# Patient Record
Sex: FEMALE | Race: BLACK OR AFRICAN AMERICAN | NOT HISPANIC OR LATINO | Employment: UNEMPLOYED | URBAN - METROPOLITAN AREA
[De-identification: names, ages, dates, MRNs, and addresses within clinical notes are randomized per-mention and may not be internally consistent; named-entity substitution may affect disease eponyms.]

---

## 2023-02-24 LAB
AMPHETAMINE (PRESENCE) IN URINE BY SCREEN METHOD: NORMAL
BARBITURATES PRESENCE IN URINE BY SCREEN METHOD: NORMAL
BENZODIAZEPINE (PRESENCE) IN URINE BY SCREEN METHOD: NORMAL
CANNABINOIDS IN URINE BY SCREEN METHOD: NORMAL
COCAINE (PRESENCE) IN URINE BY SCREEN METHOD: NORMAL
DRUG SCREEN COMMENT URINE: NORMAL
FENTANYL URINE: NORMAL
METHADONE (PRESENCE) IN URINE BY SCREEN METHOD: NORMAL
OPIATES (PRESENCE) IN URINE BY SCREEN METHOD: NORMAL
OXYCODONE (PRESENCE) IN URINE BY SCREEN METHOD: NORMAL
PHENCYCLIDINE (PRESENCE) IN URINE BY SCREEN METHOD: NORMAL

## 2023-02-27 LAB — BENZOYLECGONINE, URINE: <50 NG/ML

## 2023-04-13 LAB
ABO GROUP (TYPE) IN BLOOD: NORMAL
ANTIBODY SCREEN: NORMAL
RH FACTOR: NORMAL

## 2023-05-05 LAB
CREATININE (MG/DL) IN SER/PLAS: NORMAL
GFR FEMALE: NORMAL
GFR MALE: NORMAL
UREA NITROGEN (MG/DL) IN SER/PLAS: NORMAL

## 2023-07-10 ENCOUNTER — HOSPITAL ENCOUNTER (OUTPATIENT)
Dept: DATA CONVERSION | Facility: HOSPITAL | Age: 53
End: 2023-07-10
Attending: OTOLARYNGOLOGY | Admitting: OTOLARYNGOLOGY
Payer: COMMERCIAL

## 2023-07-10 DIAGNOSIS — E78.5 HYPERLIPIDEMIA, UNSPECIFIED: ICD-10-CM

## 2023-07-10 DIAGNOSIS — R60.9 EDEMA, UNSPECIFIED: ICD-10-CM

## 2023-07-10 DIAGNOSIS — N18.32 CHRONIC KIDNEY DISEASE, STAGE 3B (MULTI): ICD-10-CM

## 2023-07-10 DIAGNOSIS — I12.9 HYPERTENSIVE CHRONIC KIDNEY DISEASE WITH STAGE 1 THROUGH STAGE 4 CHRONIC KIDNEY DISEASE, OR UNSPECIFIED CHRONIC KIDNEY DISEASE: ICD-10-CM

## 2023-07-10 DIAGNOSIS — C41.0 MALIGNANT NEOPLASM OF BONES OF SKULL AND FACE (MULTI): ICD-10-CM

## 2023-07-10 DIAGNOSIS — F17.200 NICOTINE DEPENDENCE, UNSPECIFIED, UNCOMPLICATED: ICD-10-CM

## 2023-07-10 DIAGNOSIS — F10.10 ALCOHOL ABUSE, UNCOMPLICATED: ICD-10-CM

## 2023-07-10 DIAGNOSIS — I89.8 OTHER SPECIFIED NONINFECTIVE DISORDERS OF LYMPHATIC VESSELS AND LYMPH NODES: ICD-10-CM

## 2023-07-10 DIAGNOSIS — F12.90 CANNABIS USE, UNSPECIFIED, UNCOMPLICATED: ICD-10-CM

## 2023-07-11 LAB
COMPLETE PATHOLOGY REPORT: NORMAL
CONVERTED DIAGNOSIS COMMENT: NORMAL
CONVERTED FINAL DIAGNOSIS: NORMAL
CONVERTED FINAL REPORT PDF LINK TO COPY AND PASTE: NORMAL
CONVERTED SPECIMEN DESCRIPTION: NORMAL

## 2023-09-26 PROBLEM — K64.3 PROLAPSED INTERNAL HEMORRHOIDS, GRADE 4: Status: ACTIVE | Noted: 2023-09-26

## 2023-09-26 PROBLEM — M27.2 OSTEORADIONECROSIS OF MANDIBLE: Status: ACTIVE | Noted: 2023-09-26

## 2023-09-26 PROBLEM — K21.9 REFLUX LARYNGITIS: Status: ACTIVE | Noted: 2023-09-26

## 2023-09-26 PROBLEM — R22.1 NECK MASS: Status: ACTIVE | Noted: 2023-09-26

## 2023-09-26 PROBLEM — C73 THYROID CARCINOMA (MULTI): Status: ACTIVE | Noted: 2023-09-26

## 2023-09-26 PROBLEM — D70.9 NEUTROPENIC FEVER (CMS-HCC): Status: ACTIVE | Noted: 2023-09-26

## 2023-09-26 PROBLEM — K06.8 GUM LESION: Status: ACTIVE | Noted: 2023-09-26

## 2023-09-26 PROBLEM — R13.10 DYSPHAGIA: Status: ACTIVE | Noted: 2023-09-26

## 2023-09-26 PROBLEM — F32.1 MAJOR DEPRESSIVE DISORDER, SINGLE EPISODE, MODERATE (MULTI): Status: ACTIVE | Noted: 2023-09-26
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
## 2023-09-26 PROBLEM — R60.9 PAROTID SWELLING: Status: ACTIVE | Noted: 2023-09-26

## 2023-09-26 PROBLEM — C41.1: Status: ACTIVE | Noted: 2023-09-26

## 2023-09-26 PROBLEM — C31.0: Status: ACTIVE | Noted: 2023-09-26

## 2023-09-26 PROBLEM — M79.2 NEUROPATHIC PAIN: Status: ACTIVE | Noted: 2023-09-26

## 2023-09-26 PROBLEM — I89.0 LYMPHEDEMA OF FACE: Status: ACTIVE | Noted: 2023-09-26

## 2023-09-26 PROBLEM — J04.0 REFLUX LARYNGITIS: Status: ACTIVE | Noted: 2023-09-26

## 2023-09-26 PROBLEM — D35.00 ADRENAL ADENOMA: Status: ACTIVE | Noted: 2023-09-26

## 2023-09-26 PROBLEM — K64.4 ANAL SKIN TAG: Status: ACTIVE | Noted: 2023-09-26

## 2023-09-26 PROBLEM — F41.9 ANXIETY DISORDER: Status: ACTIVE | Noted: 2023-09-26

## 2023-09-26 PROBLEM — M67.439 GANGLION CYST OF WRIST: Status: ACTIVE | Noted: 2023-09-26

## 2023-09-26 PROBLEM — E26.09 PRIMARY HYPERALDOSTERONISM (MULTI): Status: ACTIVE | Noted: 2023-09-26

## 2023-09-26 PROBLEM — N18.30 CKD (CHRONIC KIDNEY DISEASE) STAGE 3, GFR 30-59 ML/MIN (MULTI): Status: ACTIVE | Noted: 2023-09-26

## 2023-09-26 PROBLEM — E03.9 HYPOTHYROIDISM: Status: ACTIVE | Noted: 2023-09-26

## 2023-09-26 PROBLEM — R29.898: Status: ACTIVE | Noted: 2023-09-26

## 2023-09-26 PROBLEM — I12.9 HYPERTENSIVE RENAL DISEASE: Status: ACTIVE | Noted: 2021-02-23

## 2023-09-26 PROBLEM — C80.1 MALIGNANT NEOPLASM (MULTI): Status: ACTIVE | Noted: 2022-06-27

## 2023-09-26 PROBLEM — M79.89 SWELLING OF DIGIT OF RIGHT HAND: Status: ACTIVE | Noted: 2023-09-26

## 2023-09-26 PROBLEM — M65.90 TENOSYNOVITIS: Status: ACTIVE | Noted: 2023-09-26

## 2023-09-26 PROBLEM — M54.16 LUMBAR RADICULOPATHY: Status: ACTIVE | Noted: 2023-09-26

## 2023-09-26 PROBLEM — R50.81 NEUTROPENIC FEVER (CMS-HCC): Status: ACTIVE | Noted: 2023-09-26

## 2023-09-26 PROBLEM — R53.1 GENERALIZED WEAKNESS: Status: ACTIVE | Noted: 2023-09-26

## 2023-09-26 PROBLEM — R07.9 CHEST PAIN: Status: ACTIVE | Noted: 2023-09-26

## 2023-09-26 PROBLEM — G89.3 NEOPLASM RELATED PAIN: Status: ACTIVE | Noted: 2023-09-26

## 2023-09-26 PROBLEM — K14.0 TONGUE ULCER: Status: ACTIVE | Noted: 2023-09-26

## 2023-09-26 PROBLEM — M65.9 TENOSYNOVITIS: Status: ACTIVE | Noted: 2023-09-26

## 2023-09-26 RX ORDER — NITROGLYCERIN 0.4 MG/1
TABLET SUBLINGUAL
Status: ON HOLD | COMMUNITY
Start: 2016-01-29 | End: 2023-11-05 | Stop reason: SDUPTHER

## 2023-09-26 RX ORDER — FENTANYL 25 UG/1
PATCH TRANSDERMAL
Status: ON HOLD | COMMUNITY
Start: 2022-05-23 | End: 2023-11-02 | Stop reason: ALTCHOICE

## 2023-09-26 RX ORDER — LEVOTHYROXINE SODIUM 25 UG/1
TABLET ORAL
COMMUNITY
Start: 2023-04-21 | End: 2023-10-05

## 2023-09-26 RX ORDER — IBUPROFEN 200 MG
TABLET ORAL
Status: ON HOLD | COMMUNITY
Start: 2022-09-28 | End: 2023-11-02

## 2023-09-26 RX ORDER — HYDROCORTISONE 25 MG/G
CREAM TOPICAL
Status: ON HOLD | COMMUNITY
Start: 2022-05-25 | End: 2023-11-02 | Stop reason: ALTCHOICE

## 2023-09-26 RX ORDER — FLUOXETINE 20 MG/1
1 TABLET ORAL DAILY
Status: ON HOLD | COMMUNITY
Start: 2022-07-15 | End: 2023-11-05 | Stop reason: SDUPTHER

## 2023-09-26 RX ORDER — ASPIRIN 325 MG
325 TABLET ORAL
COMMUNITY
Start: 2022-03-24 | End: 2023-11-05 | Stop reason: HOSPADM

## 2023-09-26 RX ORDER — METHADONE HYDROCHLORIDE 10 MG/5ML
SOLUTION ORAL
COMMUNITY
End: 2023-10-18 | Stop reason: DRUGHIGH

## 2023-09-26 RX ORDER — METOPROLOL SUCCINATE 50 MG/1
TABLET, EXTENDED RELEASE ORAL
COMMUNITY
Start: 2023-07-03 | End: 2023-10-18 | Stop reason: WASHOUT

## 2023-09-26 RX ORDER — SENNOSIDES 8.6 MG/1
TABLET ORAL
COMMUNITY
Start: 2022-03-24 | End: 2023-10-18 | Stop reason: DRUGHIGH

## 2023-09-26 RX ORDER — NICOTINE 7MG/24HR
PATCH, TRANSDERMAL 24 HOURS TRANSDERMAL
Status: ON HOLD | COMMUNITY
Start: 2022-10-27 | End: 2023-11-02

## 2023-09-26 RX ORDER — DEXAMETHASONE 4 MG/1
TABLET ORAL
Status: ON HOLD | COMMUNITY
Start: 2022-06-08 | End: 2023-11-02 | Stop reason: ALTCHOICE

## 2023-09-26 RX ORDER — ROSUVASTATIN CALCIUM 10 MG/1
10 TABLET, COATED ORAL DAILY
Status: ON HOLD | COMMUNITY
Start: 2023-07-03 | End: 2023-11-05 | Stop reason: SDUPTHER

## 2023-09-26 RX ORDER — OMEPRAZOLE 40 MG/1
CAPSULE, DELAYED RELEASE ORAL
COMMUNITY
Start: 2023-06-16 | End: 2023-10-18 | Stop reason: DRUGHIGH

## 2023-09-26 RX ORDER — CHLORHEXIDINE GLUCONATE ORAL RINSE 1.2 MG/ML
15 SOLUTION DENTAL 4 TIMES DAILY
Status: ON HOLD | COMMUNITY
Start: 2022-03-31 | End: 2023-11-02 | Stop reason: SDUPTHER

## 2023-09-26 RX ORDER — NYSTATIN 100000 [USP'U]/ML
SUSPENSION ORAL
Status: ON HOLD | COMMUNITY
Start: 2022-11-04 | End: 2023-11-02 | Stop reason: ALTCHOICE

## 2023-09-26 RX ORDER — AMLODIPINE BESYLATE 10 MG/1
10 TABLET ORAL DAILY
COMMUNITY
Start: 2015-05-26 | End: 2023-10-18 | Stop reason: SDUPTHER

## 2023-09-26 RX ORDER — LISINOPRIL 40 MG/1
1 TABLET ORAL DAILY
Status: ON HOLD | COMMUNITY
Start: 2016-01-26 | End: 2023-11-05 | Stop reason: SDUPTHER

## 2023-09-26 RX ORDER — PROCHLORPERAZINE MALEATE 10 MG
TABLET ORAL
Status: ON HOLD | COMMUNITY
Start: 2022-06-08 | End: 2023-11-02 | Stop reason: ALTCHOICE

## 2023-09-26 RX ORDER — PETROLATUM 1 G/G
OINTMENT TOPICAL
Status: ON HOLD | COMMUNITY
Start: 2022-06-09 | End: 2023-11-05 | Stop reason: SDUPTHER

## 2023-09-26 RX ORDER — ACETAMINOPHEN 160 MG/5ML
SUSPENSION ORAL
COMMUNITY
Start: 2022-03-24 | End: 2023-10-18 | Stop reason: SDUPTHER

## 2023-09-26 RX ORDER — OLANZAPINE 5 MG/1
5 TABLET ORAL NIGHTLY
Status: ON HOLD | COMMUNITY
Start: 2022-06-10 | End: 2023-11-02 | Stop reason: SDUPTHER

## 2023-09-26 RX ORDER — OXYCODONE HCL 20 MG/ML
CONCENTRATE, ORAL ORAL
Status: ON HOLD | COMMUNITY
Start: 2023-01-03 | End: 2023-11-02 | Stop reason: ALTCHOICE

## 2023-09-26 RX ORDER — HYDRALAZINE HYDROCHLORIDE 50 MG/1
1 TABLET, FILM COATED ORAL 3 TIMES DAILY
COMMUNITY
Start: 2022-03-02 | End: 2023-11-05 | Stop reason: HOSPADM

## 2023-09-26 RX ORDER — EVENING PRIMROSE OIL 500 MG
100 CAPSULE ORAL DAILY
Status: ON HOLD | COMMUNITY
End: 2023-11-02 | Stop reason: ALTCHOICE

## 2023-09-26 RX ORDER — FAMOTIDINE 20 MG/1
20 TABLET, FILM COATED ORAL DAILY
COMMUNITY
Start: 2022-03-24 | End: 2023-10-18 | Stop reason: DRUGHIGH

## 2023-09-26 RX ORDER — DM/P-EPHED/ACETAMINOPH/DOXYLAM 30-7.5/3
LIQUID (ML) ORAL
Status: ON HOLD | COMMUNITY
Start: 2022-10-11 | End: 2023-11-02

## 2023-09-26 RX ORDER — SPIRONOLACTONE 25 MG/1
25 TABLET ORAL DAILY
COMMUNITY
Start: 2017-07-10 | End: 2023-10-18 | Stop reason: DRUGHIGH

## 2023-09-26 RX ORDER — PREDNISONE 20 MG/1
TABLET ORAL
Status: ON HOLD | COMMUNITY
Start: 2022-03-02 | End: 2023-11-02 | Stop reason: ALTCHOICE

## 2023-09-26 RX ORDER — OMEPRAZOLE 20 MG/1
CAPSULE, DELAYED RELEASE ORAL
COMMUNITY
End: 2023-10-18 | Stop reason: DRUGHIGH

## 2023-09-26 RX ORDER — ONDANSETRON HYDROCHLORIDE 8 MG/1
TABLET, FILM COATED ORAL
Status: ON HOLD | COMMUNITY
Start: 2022-06-08 | End: 2023-11-02 | Stop reason: ALTCHOICE

## 2023-09-26 RX ORDER — METHADONE HYDROCHLORIDE 5 MG/5ML
SOLUTION ORAL
COMMUNITY
Start: 2023-03-20 | End: 2023-10-18 | Stop reason: DRUGHIGH

## 2023-09-26 RX ORDER — GABAPENTIN 250 MG/5ML
SOLUTION ORAL
COMMUNITY
Start: 2022-03-24 | End: 2023-10-18 | Stop reason: DRUGHIGH

## 2023-09-26 RX ORDER — ACETAMINOPHEN 160 MG/5ML
SUSPENSION ORAL
COMMUNITY
Start: 2022-12-01 | End: 2023-10-18 | Stop reason: SDUPTHER

## 2023-09-26 RX ORDER — NALOXONE HYDROCHLORIDE 4 MG/.1ML
4 SPRAY NASAL DAILY PRN
COMMUNITY
Start: 2022-03-24 | End: 2023-11-05 | Stop reason: HOSPADM

## 2023-09-26 RX ORDER — METOPROLOL TARTRATE 25 MG/1
TABLET, FILM COATED ORAL
COMMUNITY
Start: 2022-03-24 | End: 2023-10-18 | Stop reason: DRUGHIGH

## 2023-09-26 RX ORDER — PENTOXIFYLLINE 400 MG/1
400 TABLET, EXTENDED RELEASE ORAL
Status: ON HOLD | COMMUNITY
Start: 2023-06-12 | End: 2023-11-02 | Stop reason: SDUPTHER

## 2023-09-26 RX ORDER — PRAVASTATIN SODIUM 40 MG/1
40 TABLET ORAL NIGHTLY
COMMUNITY
End: 2023-10-18 | Stop reason: DRUGHIGH

## 2023-09-26 RX ORDER — FENTANYL 12.5 UG/1
PATCH TRANSDERMAL
Status: ON HOLD | COMMUNITY
Start: 2022-04-07 | End: 2023-11-02 | Stop reason: ALTCHOICE

## 2023-09-26 RX ORDER — OXYCODONE HCL 5 MG/5 ML
SOLUTION, ORAL ORAL
COMMUNITY
Start: 2022-03-24 | End: 2023-10-18 | Stop reason: DRUGHIGH

## 2023-09-26 RX ORDER — HYDROCHLOROTHIAZIDE 25 MG/1
1 TABLET ORAL DAILY
Status: ON HOLD | COMMUNITY
Start: 2017-07-10 | End: 2023-11-05 | Stop reason: SDUPTHER

## 2023-09-26 RX ORDER — DOCUSATE SODIUM 50 MG/5ML
LIQUID ORAL
COMMUNITY
Start: 2022-03-24 | End: 2023-10-18 | Stop reason: DRUGHIGH

## 2023-09-26 RX ORDER — LEVOTHYROXINE SODIUM 50 UG/1
TABLET ORAL
COMMUNITY
Start: 2023-02-16 | End: 2023-10-05

## 2023-09-26 RX ORDER — PREGABALIN 20 MG/ML
SOLUTION ORAL
Status: ON HOLD | COMMUNITY
Start: 2022-08-11 | End: 2023-11-02 | Stop reason: ALTCHOICE

## 2023-10-02 ENCOUNTER — PHARMACY VISIT (OUTPATIENT)
Dept: PHARMACY | Facility: CLINIC | Age: 53
End: 2023-10-02
Payer: MEDICAID

## 2023-10-02 ENCOUNTER — OFFICE VISIT (OUTPATIENT)
Dept: WOUND CARE | Facility: CLINIC | Age: 53
End: 2023-10-02
Payer: COMMERCIAL

## 2023-10-02 PROCEDURE — G0277 HBOT, FULL BODY CHAMBER, 30M: HCPCS

## 2023-10-02 PROCEDURE — RXMED WILLOW AMBULATORY MEDICATION CHARGE

## 2023-10-03 ENCOUNTER — TELEPHONE (OUTPATIENT)
Dept: HEMATOLOGY/ONCOLOGY | Facility: HOSPITAL | Age: 53
End: 2023-10-03
Payer: COMMERCIAL

## 2023-10-03 ENCOUNTER — OFFICE VISIT (OUTPATIENT)
Dept: WOUND CARE | Facility: CLINIC | Age: 53
End: 2023-10-03
Payer: COMMERCIAL

## 2023-10-03 ENCOUNTER — PHARMACY VISIT (OUTPATIENT)
Dept: PHARMACY | Facility: CLINIC | Age: 53
End: 2023-10-03
Payer: MEDICAID

## 2023-10-03 ENCOUNTER — APPOINTMENT (OUTPATIENT)
Dept: LAB | Facility: HOSPITAL | Age: 53
End: 2023-10-03
Payer: COMMERCIAL

## 2023-10-03 DIAGNOSIS — C41.1 CANCER OF INFERIOR MAXILLA (MULTI): Primary | ICD-10-CM

## 2023-10-03 PROCEDURE — RXMED WILLOW AMBULATORY MEDICATION CHARGE

## 2023-10-03 PROCEDURE — G0277 HBOT, FULL BODY CHAMBER, 30M: HCPCS

## 2023-10-03 PROCEDURE — 99183 HYPERBARIC OXYGEN THERAPY: CPT | Performed by: NURSE PRACTITIONER

## 2023-10-04 ENCOUNTER — SOCIAL WORK (OUTPATIENT)
Dept: HEMATOLOGY/ONCOLOGY | Facility: HOSPITAL | Age: 53
End: 2023-10-04
Payer: COMMERCIAL

## 2023-10-04 ENCOUNTER — OFFICE VISIT (OUTPATIENT)
Dept: WOUND CARE | Facility: CLINIC | Age: 53
End: 2023-10-04
Payer: COMMERCIAL

## 2023-10-04 PROCEDURE — G0277 HBOT, FULL BODY CHAMBER, 30M: HCPCS

## 2023-10-04 NOTE — PROGRESS NOTES
WILMER was consulted by SHAMEKA Boyd, RN Partner to assist with transportation. Patient had an appt on 10/3/23 but was not able to make it. Patient has been reschedule for appt on 10/5/23 at 8am. WILMER called [facility] to set up trip. Patient will be picked up between 7am and 7:30am. Patient will have return ride at 9:00am. Trip # is [id number]. WILMER called patient today to update her and also updated RN Partner.    10/5/23: SW received phone call from patient today. She reported she missed her returned ride as she just finished her appt at 9:30am. She needed help getting home. WILMER called [facility] and set up return ride. Patient should received text when [employer] is on the way. WILMER called patient to inform  her.

## 2023-10-05 ENCOUNTER — NUTRITION (OUTPATIENT)
Dept: HEMATOLOGY/ONCOLOGY | Facility: HOSPITAL | Age: 53
End: 2023-10-05
Payer: COMMERCIAL

## 2023-10-05 ENCOUNTER — PHARMACY VISIT (OUTPATIENT)
Dept: PHARMACY | Facility: CLINIC | Age: 53
End: 2023-10-05
Payer: MEDICAID

## 2023-10-05 ENCOUNTER — TELEPHONE (OUTPATIENT)
Dept: HEMATOLOGY/ONCOLOGY | Facility: HOSPITAL | Age: 53
End: 2023-10-05
Payer: COMMERCIAL

## 2023-10-05 ENCOUNTER — APPOINTMENT (OUTPATIENT)
Dept: WOUND CARE | Facility: CLINIC | Age: 53
End: 2023-10-05
Payer: COMMERCIAL

## 2023-10-05 ENCOUNTER — OFFICE VISIT (OUTPATIENT)
Dept: HEMATOLOGY/ONCOLOGY | Facility: HOSPITAL | Age: 53
End: 2023-10-05
Payer: COMMERCIAL

## 2023-10-05 VITALS
DIASTOLIC BLOOD PRESSURE: 62 MMHG | SYSTOLIC BLOOD PRESSURE: 107 MMHG | OXYGEN SATURATION: 90 % | BODY MASS INDEX: 31.05 KG/M2 | TEMPERATURE: 99 F | WEIGHT: 169.75 LBS | RESPIRATION RATE: 16 BRPM

## 2023-10-05 VITALS — HEIGHT: 62 IN | WEIGHT: 169.75 LBS | BODY MASS INDEX: 31.24 KG/M2

## 2023-10-05 DIAGNOSIS — S11.90XA OPEN WOUND OF NECK, INITIAL ENCOUNTER: ICD-10-CM

## 2023-10-05 DIAGNOSIS — E03.8 OTHER SPECIFIED HYPOTHYROIDISM: ICD-10-CM

## 2023-10-05 DIAGNOSIS — C41.1 CANCER OF INFERIOR MAXILLA (MULTI): Primary | ICD-10-CM

## 2023-10-05 DIAGNOSIS — H91.90 HEARING LOSS, UNSPECIFIED HEARING LOSS TYPE, UNSPECIFIED LATERALITY: ICD-10-CM

## 2023-10-05 PROCEDURE — 3078F DIAST BP <80 MM HG: CPT | Performed by: STUDENT IN AN ORGANIZED HEALTH CARE EDUCATION/TRAINING PROGRAM

## 2023-10-05 PROCEDURE — RXMED WILLOW AMBULATORY MEDICATION CHARGE

## 2023-10-05 PROCEDURE — 3074F SYST BP LT 130 MM HG: CPT | Performed by: STUDENT IN AN ORGANIZED HEALTH CARE EDUCATION/TRAINING PROGRAM

## 2023-10-05 PROCEDURE — 99215 OFFICE O/P EST HI 40 MIN: CPT | Performed by: STUDENT IN AN ORGANIZED HEALTH CARE EDUCATION/TRAINING PROGRAM

## 2023-10-05 RX ORDER — LEVOTHYROXINE SODIUM 100 UG/1
100 TABLET ORAL EVERY MORNING
Status: CANCELLED | OUTPATIENT
Start: 2023-10-05 | End: 2023-11-04

## 2023-10-05 RX ORDER — LEVOTHYROXINE SODIUM 100 UG/1
100 TABLET ORAL
Qty: 30 TABLET | Refills: 11 | Status: ON HOLD | OUTPATIENT
Start: 2023-10-05 | End: 2023-11-05 | Stop reason: SDUPTHER

## 2023-10-05 ASSESSMENT — ENCOUNTER SYMPTOMS
TROUBLE SWALLOWING: 1
HEADACHES: 0
ABDOMINAL PAIN: 0
CONSTIPATION: 0
DIARRHEA: 0
NUMBNESS: 1
LIGHT-HEADEDNESS: 0
VOMITING: 0
CHILLS: 0
SHORTNESS OF BREATH: 0
LEG SWELLING: 0
FEVER: 0
NAUSEA: 0
SORE THROAT: 0
COUGH: 1
ARTHRALGIAS: 0

## 2023-10-05 NOTE — TELEPHONE ENCOUNTER
Called patient to confirm she stopped at the lab after her appointment today with ROZINA Boles. Patient said that we were taking too long and that she was not able to stop at the lab on her way out. This RN asked that she stop at the lab at [place] today when she goes for her wound appointment. Patient did not confirm she would do this and hung up the phone.

## 2023-10-05 NOTE — PROGRESS NOTES
Patient ID: Pamella Rose is a 53 y.o. female.  Diagnosis: Right maxillary sinus  squamous cell carcinoma  Staging: [id number]  Date of Diagnosis:    Providers:   ENT: Dr. Jay Delgado  MedOn: Dr. Kyunghee Burkitt  RadOn: Dr. Nadege Horton      SURGERY:  2/25/22: Right maxillary biopsy showed poorly differentiated Squamous Cell Carcinoma with focal keratinization involving squmous mucosa.   3/20/22: Right Neck Exploration,  right scapular tip  free flap; maxilla reconstruction with placement of hardware   3/22/23: Right Mandibular Debridement w/ Dr. Delgado.      PRIOR THERAPIES:  5/11 - 6/22/22: Completed concurrent chemoXRT w/ 7 weeks of Carboplatin (2mg/AUC/dose) + Paclitaxel (45mg/m2) and 66gy VMAT in 30 fx.         ONCOLOGIC HISTORY  - 2/4/22: Pt initially presented to Dr. Delgado with swelling of the upper gum area that didn’t improve with abx. Referred by who Dentist did an in-office xray and was concerned about a mass in the sinus  - 2/4/22 FNA showed some atypical epithelioid cells, but no definitive diagnosis  - 2/18/22: CT neck with contrast showed a large destructive mass centered in the right maxillary sinus with marked osseous destruction and extension beyond the sinus walls into the nasal cavity, hard palate, and periantral soft tissues. There is also  likely subtle extension into the inferior right orbit and possibly involvement of the infraorbital canal concerning for perineural extension of neoplasm. There are several enlarged and heterogeneous lymph nodes bilaterally compatible with metastasis.  - 2/25/22 Right maxillary biopsy showed poorly differentiated Squamous Cell Carcinoma with focal keratinization involving squmous mucosa.   - 3/14/22: PET/CT showed subcentimeter RUL without significant metabolic activity, needs to be monitored with CT chest with contrast.  - 3/16/22 Surgery: Right hemimaxillectomy without orbital exenteration, bilateral  NE (Right leverl 1-4, Left level 1-3); DL,  bronchoscopy, e  sophagoscopy; Rigid Nasal Endoscopy with biopsy; Open tracheostomy with Dr. Delgado. Left scapular tip and latissimus muscle free flap , maxillary reconstruction with placement of hardware, Orbital floor reconstruction  with placement of hardware with Dr. Al.  - 3/16/22 Pathology revealed 4 LNs (with metastatic adenosquamous carcinoma) and 1 with thyroid carcinoma (subcapsular micrometastasis), level Right 1B, bilateral level 2/3 lymph nodes, with PAOLA.  Patient will need US thyroid in future and potential total thyroidectomy.  - 3/22/22 Surgery:  Right Neck Exploration, r ight scapular tip free flap; m axilla reconstruction with placement of hardware with Dr. Al   - 3/25/22 Tumor Board: Recently found to have right maxilla mass underwent surgical resection, findings c/w adeno-squamous carcinoma, also found to have  metastatic thyroid carcinoma in left level 2/3 lymph node. Rec: Adjuvant chemoradiation; total thyroidectomy  -  - : Completed concurrent chemoXRT w/ 7 weeks of Carboplatin (2mg/AUC/dose) + Paclitaxel (45mg/m2) and 66gy VMAT in 30 fx.   - 3/22/23: Right Mandibular Debridement w/ Dr. Delgado.  - 7/10/23 FNA of a left cervical LN. Path showed polymorphous lymphoid tissue with no carcinoma identified.       Admission:  -  -  Admitted for Acute Otitis externa and febrile neutropenia. Received broad spectrum IV antibiotics. D/c home with oral abx          Past Medical History:   No past medical history on file.   Surgical History:    Past Surgical History:   Procedure Laterality Date     SECTION, CLASSIC  2016     Section    IR INTERVENTION VENOUS SAMPLING  2021    IR INTERVENTION VENOUS SAMPLING 2021 [facility]    IR INTERVENTION VENOUS SAMPLING  2021    IR INTERVENTION VENOUS SAMPLING 2021 [facility]      Family History:    Family History   Problem Relation Name Age of Onset    Other (CABG) Mother      Colon cancer Other grandfather      Family  Oncology History:    Cancer-related family history includes Colon cancer in an other family member.  Social History:    Social History     Tobacco Use    Smoking status: Never     Passive exposure: Never    Smokeless tobacco: Never          HISTORY OF PRESENT ILLNESS    Chief Complaint  Squamous Cell Carcinoma of Maxillary Sinus    Interval History  Pamella Rose is a 53 y.o. female with h/o tight maxillary sinus squamous cell carcinoma. [id number].  S/p right hemimaxillectomy, left scapula flap with revision and right scapula tip free flap, bilateral ND. From 5/11/ - 6/22/22 she completed concurrent chemoXRT w/ 7 weeks of Carboplatin (2mg/AUC/dose) + Paclitaxel (45mg/m2) and 66gy VMAT  in 30 fx. 11/18/22 PET/CT showed good response, MAYELA per ENT exam.     Patient presents for 16 month follow up and reports the following:    Right side of neck started draining about 2 weeks ago.   Drainage is a mix of white mucoid substance mixed with tinges of blood. Patient is applying bandage to this area to cover.   Still has swelling in the face.   Using PEG for all nutrition. Drinking clear liquids by mouth. She continues to have moderate dysphagia, sometimes coughs after drinking water.   Reports weakness and pain in the right arm is largely unchanged. Has shooting pain from her right neck down to right arm. This is also unchanged. She's not able to lift her right arm past 120 degrees. She reports she did go to PT for the weakness in right arm but has not regain much strength  back.  Overall energy and stamina is low but stable.   Numbness in b/l hand and feet occasionally. Feels hearing is getting worse.  Needs a hearing test, patient is agreeable.     She had FNA of a left cervical LN on 7/10/23. Path showed polymorphous lymphoid tissue with no carcinoma identified    ROS  Review of Systems   Constitutional:  Negative for chills and fever.   HENT:   Positive for hearing loss and trouble swallowing. Negative for lump/mass,  mouth sores, nosebleeds and sore throat.    Respiratory:  Positive for cough. Negative for shortness of breath.    Cardiovascular:  Negative for chest pain and leg swelling.   Gastrointestinal:  Negative for abdominal pain, constipation, diarrhea, nausea and vomiting.   Musculoskeletal:  Negative for arthralgias.   Skin:  Negative for rash.   Neurological:  Positive for numbness. Negative for headaches and light-headedness.       Allergies  No Known Allergies     Medications  Current Outpatient Medications   Medication Instructions    acetaminophen 160 mg/5 mL (5 mL) suspension oral    amLODIPine (NORVASC) 10 mg, g-tube, Daily    amoxicillin-pot clavulanate (Augmentin) 875-125 mg tablet TAKE 1 TABLET BY MOUTH TWO TIMES A DAY    aspirin 325 mg, oral    Children's Acetaminophen 160 mg/5 mL suspension     chlorhexidine (Peridex) 0.12 % solution  RINSE MOUTH WITH 15ML (1 CAPFUL) FOR 30 SECONDS 3 times daily.<BR>    chlorhexidine (Peridex) 0.12 % solution RINSE WITH 15 ML FOUR TIMES A DAY AFTER MEALS AND AT BEDTIME    dexAMETHasone (Decadron) 4 mg tablet oral    docusate sodium (Colace) 50 mg/5 mL oral liquid oral    famotidine (PEPCID) 20 mg, g-tube, Daily    fentaNYL (Duragesic) 12 mcg/hr patch fentaNYL 12 MCG/HR Transdermal Patch 72 Hour    fentaNYL (Duragesic) 25 mcg/hr patch  fentaNYL 25 MCG/HR Transdermal Patch 72 Hour Quantity: 5 Refills: 0 Start : 23-May-2022 Active<BR>    FLUoxetine (PROzac) 20 mg tablet 2 tablets, oral, Daily    gabapentin 250 mg/5 mL solution oral    hydrALAZINE (Apresoline) 50 mg tablet oral    hydroCHLOROthiazide (HYDRODiuril) 25 mg tablet 1 tablet, oral, Daily    hydrocortisone 2.5 % cream Hydrocortisone 2.5 % External Cream   Quantity: 30  Refills: 0        Start : 25-May-2022   Active    levothyroxine (Synthroid, Levoxyl) 25 mcg tablet     levothyroxine (Synthroid, Levoxyl) 25 mcg tablet TAKE 1 TABLET BY MOUTH EVERY MORNING ON AN EMPTY STOMACH    levothyroxine (Synthroid, Levoxyl) 50 mcg  tablet     levothyroxine (Synthroid, Levoxyl) 88 mcg tablet TAKE 1 TABLET BY MOUTH ONCE A DAY IN THE MORNING ON AN EMPTY STOMACH, 30MIN TO 1 HOUR BEFORE BREAKFAST OR OTHER MEDICATIONS    lisinopril 40 mg tablet 1 tablet, oral, Daily    methadone (Dolophine) 10 mg/5 mL solution oral    methadone (Dolophine) 5 mg/5 mL solution     metoprolol succinate XL (Toprol-XL) 50 mg 24 hr tablet     metoprolol tartrate (Lopressor) 25 mg tablet oral    naloxone (NARCAN) 4 mg, nasal, Daily PRN    nicotine (Nicoderm CQ) 14 mg/24 hr patch     nicotine (Nicoderm CQ) 7 mg/24 hr patch  PLACE 1 PATCH ONTO THE SKIN ONCE DAILY (EVERY 24 HOURS)    nicotine polacrilex (Commit) 2 mg lozenge PLACE 2 MG INSIDE CHEEK AS NEEDED (USE ONE LOZENGE EVERY 1-2 HOURS AS NEEDED FOR CRAVINGS)<BR>    nitroglycerin (Nitrostat) 0.4 mg SL tablet  PLACE 1 TABLET UNDER THE TONGUE EVERY 5 MINUTES FOR UP TO 3 DOSES AS NEEDED FOR CHEST PAIN.CALL 911 IF PAIN PERSISTS.<BR>    nystatin (Mycostatin) 100,000 unit/mL suspension Swish and spit 5mL three times a day. Swish for 1 minute<BR>    OLANZapine (ZyPREXA) 5 mg tablet TAKE 1 TABLET BY MOUTH ONCE DAILY AT BEDTIME    OLANZapine (ZYPREXA) 5 mg, oral, Nightly    omeprazole (PriLOSEC) 20 mg DR capsule TAKE 1 CAPSULE BY MOUTH EVERY DAY BEFORE BREAKFAST DO NOT CRUSH OR CHEW<BR>    omeprazole (PriLOSEC) 40 mg DR capsule     omeprazole (PriLOSEC) 40 mg DR capsule TAKE 1 CAPSULE BY MOUTH ONCE DAILY    ondansetron (Zofran) 8 mg tablet oral    oxyCODONE (Roxicodone) 20 mg/mL concentrated solution     oxyCODONE (Roxicodone) 5 mg/5 mL solution oral    oxyCODONE (Roxicodone) 5 mg/5 mL solution TAKE 5 ML BY MOUTH EVERY 12 HOURS AS NEEDED FOR PAIN.    pentoxifylline (Trental) 400 mg ER tablet TAKE 1 TABLET 3 TIMES DAILY.    pentoxifylline (TRENTAL) 400 mg, oral, 3 times daily with meals    pravastatin (PRAVACHOL) 40 mg, g-tube, Nightly    predniSONE (Deltasone) 20 mg tablet oral    pregabalin (LYRICA) 20 mg/mL solution oral     prochlorperazine (Compazine) 10 mg tablet oral    rosuvastatin (Crestor) 10 mg tablet     sennosides (Senokot) 8.6 mg tablet oral    spironolactone (ALDACTONE) 25 mg, g-tube, Daily    vitamin E 100 Units, oral, Daily    white petrolatum 41 % ointment ointment  APPLY SPARINGLY TO AFFECTED AREA(S) 3 TIMES A DAY<BR>        OBJECTIVE:    VS:  /62 (BP Location: Left arm, Patient Position: Sitting, BP Cuff Size: Adult)   Temp 37.2 °C (99 °F)   Resp 16   Wt 77 kg (169 lb 12.1 oz)   SpO2 90%   BMI 31.05 kg/m²   Weight:   Vitals:    10/05/23 0819   Weight: 77 kg (169 lb 12.1 oz)         Physical Exam  Constitutional:       Appearance: Normal appearance. She is well-developed.   HENT:      Head: Normocephalic and atraumatic.      Comments: + facial swelling bilaterally cheek     Right Ear: External ear normal. No tenderness.      Left Ear: External ear normal. No tenderness.      Nose: Nose normal.      Mouth/Throat:      Mouth: Mucous membranes are moist. No injury or oral lesions.      Tongue: No lesions.      Pharynx: Oropharynx is clear.   Eyes:      Extraocular Movements: Extraocular movements intact.      Conjunctiva/sclera: Conjunctivae normal.      Pupils: Pupils are equal, round, and reactive to light.   Neck:      Thyroid: No thyroid mass.        Comments: 1.5cm lesion with white mucoid and bloody drainage from right submental region.     Cardiovascular:      Rate and Rhythm: Normal rate and regular rhythm.   Pulmonary:      Effort: Pulmonary effort is normal. No respiratory distress.      Breath sounds: Normal breath sounds.   Abdominal:      General: Bowel sounds are normal. There is no distension or abdominal bruit.      Palpations: Abdomen is soft. There is no mass.      Tenderness: There is no abdominal tenderness.   Musculoskeletal:         General: Normal range of motion.      Cervical back: Normal range of motion and neck supple. No signs of trauma. Normal range of motion.      Right lower leg: No  edema.      Left lower leg: No edema.   Lymphadenopathy:      Cervical: No cervical adenopathy.      Upper Body:      Right upper body: No axillary adenopathy.      Left upper body: No axillary adenopathy.   Skin:     General: Skin is warm and dry.      Findings: No lesion or rash.   Neurological:      General: No focal deficit present.      Mental Status: She is alert and oriented to person, place, and time.      Gait: Gait is intact.   Psychiatric:         Mood and Affect: Mood and affect normal.         Behavior: Behavior is cooperative.         Diagnostic Results     Labs  Patient refused labs today    Images  None reviewed for this visit.       ASSESSMENT & PLAN    ASSESSMENT  Pamella Rose is a 53 y.o. female with h/o tight maxillary sinus squamous cell carcinoma. [id number].  S/p right hemimaxillectomy, left scapula flap with revision and right scapula tip free flap, bilateral ND. From 5/11/ - 6/22/22 she completed concurrent chemoXRT w/ 7 weeks of Carboplatin (2mg/AUC/dose) + Paclitaxel (45mg/m2) and 66gy VMAT  in 30 fx.      # Right maxillary sinus squamous cell carcinoma  - 3/16/22 Right hemimaxillectomy, left scapula flap with revision and right scapula tip free flap, b/l ND with negative margins.   - Since surgery she has pain in right side of face, tingling and swelling in right cheek, swelling has improved this week. Following Supp Onc (Sarah NETTLES) for pain management.  - 6/22/22 Completed concurrent chemoXRT w/ 7 weeks of Carboplatin (2mg/AUC/dose) + Paclitaxel (45mg/m2) and 66gy VMAT in 30 fx  - Unfortunately was hospitalized 6/26 -6/28 for febrile neutropenia and otitis externa, d/c home on oral abx, and eye and ear drops.   - 11/18/22: post treatment PET/CT showed good response, increased metabolic activity along the right maxillary bone graft, likely reactive. Patient was seen by Dr. Delgado in [place], per  his exam, she has MAYELA.  - 3/16/23: ongoing lymphedema of right face/mandible and lateral nose with  "neuropathy and pressure pain.  Pain is managed by supp onc. She is scheduled to have procedure next week to  work on exposed bone around mandible.  - 3/22/23: s/p right mandibular debridement  - 4/26/23 MRI Orbits showed post op changes w/ interval decrease in the enhancement in the surgical bed and surround soft tissues.   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  - 7/10/23 FNA of left parotic nodule showed polymorphous lymphoid tissue w/ no carcinoma identified.   - 7/20/23: Patient is stable overall, has chronic SEs from chemoradiation including chronic facial and neck swelling/lymphedema, right shoulder contracture with decreases ROM, weakness and right arm neuropathy. Has chronic fatigue. Patient presented me with  Medical Source Statement to complete from her .   - 10/5/23: Patient with about 1.5 cm open wound under right mandible at junction with neck. Has been draining for 2 weeks, drainage is mucoid mixed with blood. No fevers/chills, no erythema. Unfortunately no labs today, however does not appear to be infected. She does have wound care appts set up but unclear if patient is going to these appts consistently as she cites issues with transportation.     # Thyroid carcinoma   - 3/16/22 Pathology showing 1 left level 2/3 LN with thyroid carcinoma (subcapsular micrometastasis)  - 6/24/22 CT Neck showed Similar appearance of the thyroid gland with sub-centimeter hypodensities in the right greater than left lobe.  - 11/18/22 restaging scan did not find anything remarkable in the thyroid  - Dr. Delgado is following this, per his 6/16 FUV \"since this was an incidental micromets, and there are no significant nodules after chemoradiation, would just observe with serial ultrasounds at this time; pt has significantly scarred neck and likely no residual  disease in the thyroid\"    # Hypothroidism  - 8/11/22 TSH 3.74  - 12/1/22: TSH 17.20  - " 2/17/23: started synthroid 50mcg on 2/17/23 based on 12/1/22 TSH level.  - 4/13/23 TSH 14.91  - 6/22/23 TSH 29.7  - 7/20/23 TSH 32.9. Levothyroxine increased to 88mcg. Recheck TSH/T4 on 8/25  - 10/5/23: Patient reports she has not been taking levothyroxine. Patient did not go to lab today to have TSH redrawn. Levothyroxine 100mg was sent to [facility] pharmacy for patient to  today.     # Right eye ptosis/epiphora/possible conjunctivitis  - Right eye ptosis and epiphora, likely related to prior surgery/radiation. No vision change/eye pain  - redness/itchiness in right eye for past few weeks, continue treat her for potential conjunctivitis with Floxin otic drop     # Facial lymphedema  - Right side of face and neck. Swelling fluctuates. Start twice a week lymphedema therapy on 10/31/22. Has had good improvement on the swelling though still has lymphedema at baseline.  - Advised that she continue to do daily lymphedema exercises      # Right ear hearing loss  - Patient c/o subjective hearing loss in right ear, ear still feels full, sounds muffled 3 months after treatment  - Was referred to Audiology and had audiogram scheduled 11/11/22, however patient missed this appointment.   - 10/5/23: Patient again endorsed worsening hearing. Referral made again for Audiology and patient is agreeable.       # CKD, stage 3  - Following with Dr. Ana Lee  - Today Cr 1.58, elevated from baseline, GFR 39 also decreased. Advised patient to keep up with hydration via PEG and PO.     # Depression  - Following Sushil Bryan ([facility]), was started on Dluoxetine 20mg and Olanzapine 2.5mg     # Hypertension  - 148/97, HR 73. Patient has PCP at [facility], reports she's on Lisinopril, Amlodipine, Spironolactone, Metoprolol, and is on a statin. She reports missing some medications here and there. Advised patient to taking her BP meds daily.   - 10/5/23: /62.     # Eval for Medical Source Statement  - Paper work for janell of  ability to return to work was completed today. To be mailed to patients  by ENT nurse Georgina Hannah.        PLAN:  -- 10/9 FUV w/ Dr. Delgado @ [place], labs same day to recheck TSH/T4. Message sent to Dr. Delgado's nurse Kiet Herring to confirm appt and to remind patient to stop by [place] lab since patient did not go to lab today.  -- RTC 2 month with MedOnc on 12/7/23, labs prior, cbc, cmp, tsh/t4  -- START Levothyroxine 100mcg. RX sent to [facility] for pt to pickup today        Rohit Boles PA-C  Head & Neck Oncology  [facility]

## 2023-10-05 NOTE — PROGRESS NOTES
"NUTRITION Follow-up NOTE  Reason for Visit:  Pamella Rose is a 53 y.o. female who presents for follow up of her R Maxillary Sinus SCC [id number].    *Pt has low profile Rudy-key button PEG tube    SURGERY:  2/25/22: Right maxillary biopsy showed poorly differentiated Squamous Cell Carcinoma with focal keratinization involving squmous mucosa.   3/20/22: Right Neck Exploration,  right scapular tip  free flap; maxilla reconstruction with placement of hardware   3/22/23: Right Mandibular Debridement w/ Dr. Delgado.     PRIOR THERAPIES:  5/11 - 6/22/22: Completed concurrent chemoXRT w/ 7 weeks of Carboplatin (2mg/AUC/dose) + Paclitaxel (45mg/m2) and 66gy VMAT in 30 fx.     Anthropometrics:  Anthropometrics  Height: 158.7 cm (5' 2.48\")  Weight: 77 kg (169 lb 12.1 oz)  BMI (Calculated): 30.57  IBW/kg (Dietitian Calculated): 50 kg  Percent of IBW: 154 %  Adjusted Body Weight (kg): 57 kg  Weight Change  Weight History / % Weight Change: Wt up 1.7 kg x 2 months    Intermittent wt not listed below:  7/20/23: 75.3 kg    Wt Readings from Last 10 Encounters:   10/05/23 77 kg (169 lb 12.1 oz)   10/05/23 77 kg (169 lb 12.1 oz)   06/16/23 89 kg (196 lb 5 oz)   05/05/23 71.9 kg (158 lb 9 oz)   03/16/23 72.6 kg (160 lb 0.9 oz)   02/24/23 68.4 kg (150 lb 12.7 oz)   02/16/23 68.4 kg (150 lb 12.7 oz)   01/03/23 68.1 kg (150 lb 2.1 oz)   12/02/22 62.6 kg (138 lb 1 oz)   12/01/22 62.2 kg (137 lb 2 oz)        Food And Nutrient Intake:  Food and Nutrient History  Food and Nutrient History: Does not take any solid food by mouth.  Drinking liquids such as water, lemonade and pop.  States that she intermittently coughs with liquids.  GI Symptoms:  (odynophagia)     Food Intake  Amount of Food: except for clear liquids                   Enteral Nutrition Intake  Enteral Nutrition Formula/Solution: Isosource 1.5--3 cartons daily + Boost Plus 3 cartons daily    TF provides: 2250 kcals and 84 gm protein with 1068 ml free water   Tolerating TF without " "difficulties; no N/V/D/C.                                    Nutrition Focused Physical Exam:     No overt s/s muscle or fat wasting       Energy Needs  Calculated Energy Needs Using Equations  Height: 158.7 cm (5' 2.48\")  Weight Used for Equation Calculations: 77 kg (169 lb 12.1 oz)  Kevin Ramires Fay Equation (Overweight or Obese Patients): 1326  Estimated Energy Needs  Total Energy Estimated Needs (kCal): 1900 kCal  Estimated Fluid Needs  Total Fluid Estimated Needs (mL): 1900 mL  Method for Estimating Needs: 1 ml/kcal  Estimated Protein Needs  Total Protein Estimated Needs (g): 85 g      Diagnosis      Nutrition Diagnosis  Patient has Nutrition Diagnosis: Yes  Diagnosis Status (1): Ongoing  Nutrition Diagnosis 1: Swallowing difficulity  Related to (1): hx of head and neck cancer  As Evidenced by (1): continued reliance of TF for nutrition support; unable to take safe and adequate oral intakes    Interventions/Recommendations   Food and Nutrition Delivery  Enteral Nutrition:  (Pt prefers to change TF to strictly Isosource 1.5 (d/c Boost Plus). Will send new order to [facility].)  Total Nutrition Provided: 2250  Goals: TF goal:  6 cartons of Isosource 1.5 daily with 60 ml water before and after each feed.  Pt will need additional 200 ml water flush 4x/day (can be combination of PO liquids and free water flushes)        There are no Patient Instructions on file for this visit.    Monitoring and Evaluation   Food/Nutrient Related History Monitoring  Monitoring and Evaluation Plan:  (Pt will tolerate 6 cartons of Isosource 1.5 daily--she is aware that she can do the feeds as she prefers (ie 1.5 cartons 4x/day vs 2 cartons TID).)  Additional Plan: Maintain adequate hydration                             "

## 2023-10-09 ENCOUNTER — OFFICE VISIT (OUTPATIENT)
Dept: OTOLARYNGOLOGY | Facility: CLINIC | Age: 53
End: 2023-10-09
Payer: COMMERCIAL

## 2023-10-09 ENCOUNTER — PHARMACY VISIT (OUTPATIENT)
Dept: PHARMACY | Facility: CLINIC | Age: 53
End: 2023-10-09
Payer: MEDICAID

## 2023-10-09 ENCOUNTER — OFFICE VISIT (OUTPATIENT)
Dept: WOUND CARE | Facility: CLINIC | Age: 53
End: 2023-10-09
Payer: COMMERCIAL

## 2023-10-09 VITALS
DIASTOLIC BLOOD PRESSURE: 73 MMHG | TEMPERATURE: 98 F | SYSTOLIC BLOOD PRESSURE: 103 MMHG | HEART RATE: 65 BPM | HEIGHT: 61 IN | RESPIRATION RATE: 16 BRPM | WEIGHT: 176 LBS | BODY MASS INDEX: 33.23 KG/M2
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
## 2023-10-09 DIAGNOSIS — M27.2 OSTEORADIONECROSIS OF MANDIBLE: Primary | ICD-10-CM

## 2023-10-09 DIAGNOSIS — E03.9 HYPOTHYROIDISM, UNSPECIFIED TYPE: ICD-10-CM

## 2023-10-09 DIAGNOSIS — C41.1 CANCER OF INFERIOR MAXILLA (MULTI): Primary | ICD-10-CM

## 2023-10-09 PROCEDURE — G0277 HBOT, FULL BODY CHAMBER, 30M: HCPCS

## 2023-10-09 PROCEDURE — 87075 CULTR BACTERIA EXCEPT BLOOD: CPT | Performed by: OTOLARYNGOLOGY

## 2023-10-09 PROCEDURE — RXMED WILLOW AMBULATORY MEDICATION CHARGE

## 2023-10-09 PROCEDURE — 1036F TOBACCO NON-USER: CPT | Performed by: OTOLARYNGOLOGY

## 2023-10-09 PROCEDURE — 3074F SYST BP LT 130 MM HG: CPT | Performed by: OTOLARYNGOLOGY

## 2023-10-09 PROCEDURE — 3078F DIAST BP <80 MM HG: CPT | Performed by: OTOLARYNGOLOGY

## 2023-10-09 PROCEDURE — 99213 OFFICE O/P EST LOW 20 MIN: CPT | Performed by: OTOLARYNGOLOGY

## 2023-10-09 RX ORDER — AMOXICILLIN AND CLAVULANATE POTASSIUM 875; 125 MG/1; MG/1
1 TABLET, FILM COATED ORAL 2 TIMES DAILY
Qty: 14 TABLET | Refills: 0 | Status: SHIPPED | OUTPATIENT
Start: 2023-10-09 | End: 2023-10-16

## 2023-10-09 ASSESSMENT — ENCOUNTER SYMPTOMS
OCCASIONAL FEELINGS OF UNSTEADINESS: 0
LOSS OF SENSATION IN FEET: 0
DEPRESSION: 0

## 2023-10-09 ASSESSMENT — COLUMBIA-SUICIDE SEVERITY RATING SCALE - C-SSRS
1. IN THE PAST MONTH, HAVE YOU WISHED YOU WERE DEAD OR WISHED YOU COULD GO TO SLEEP AND NOT WAKE UP?: NO
2. HAVE YOU ACTUALLY HAD ANY THOUGHTS OF KILLING YOURSELF?: NO
6. HAVE YOU EVER DONE ANYTHING, STARTED TO DO ANYTHING, OR PREPARED TO DO ANYTHING TO END YOUR LIFE?: NO

## 2023-10-09 ASSESSMENT — PATIENT HEALTH QUESTIONNAIRE - PHQ9
2. FEELING DOWN, DEPRESSED OR HOPELESS: NOT AT ALL
SUM OF ALL RESPONSES TO PHQ9 QUESTIONS 1 AND 2: 0
1. LITTLE INTEREST OR PLEASURE IN DOING THINGS: NOT AT ALL

## 2023-10-09 NOTE — PROGRESS NOTES
"ENT Follow up Visit    Chief Complaint: Neck drainage    History Of Present Illness  Pamella Rose is a 53 y.o. female presents for follow up.  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 Had to have a revision scapula due to loss of first flap. She has been dealing with ORN of the mandible. Is on HBO, received 4 treatments. Neck started drainage few days ago. She is still tolerating PO, able to do soft foodds.     Past Medical History  She has no past medical history on file.    Surgical History  She has a past surgical history that includes  section, classic (2016); IR intervention venous sampling (2021); and IR intervention venous sampling (2021).     Social History  She reports that she has never smoked. She has never been exposed to tobacco smoke. She has never used smokeless tobacco. She reports that she does not drink alcohol and does not use drugs.    Family History  Family History   Problem Relation Name Age of Onset    Other (CABG) Mother      Colon cancer Other grandfather         Allergies  Patient has no known allergies.    Review of Systems     Exam:  NAD alert  bharti eomi NCAT  mild ectropion  facial swelling stable  bl eac and TMs clear  not much movement midface  significant radiation pigmentation and thickening to the facial skin  There are 2 small spots of purulent drainage from the neck, expressed about 1 ml, sent for culture  no obvious NEHEMIAS  ocop: No exposed bone, normal mucosa exam, stable pinpoint hole in midline palate; non tender to palpation over mandible     Last Recorded Vitals  Blood pressure 103/73, pulse 65, temperature 36.7 °C (98 °F), temperature source Temporal, resp. rate 16, height 1.549 m (5' 1\"), weight 79.8 kg (176 lb).    Result Date: 2023  Normal sinus rhythm Normal ECG When compared with ECG of 10-MAR-2022 13:39, Nonspecific T wave abnormality no longer evident in Inferior leads T wave inversion no longer " evident in Lateral leads Confirmed by Kiet White ([id number]) on 9/25/2023 8:22:47 AM    XR chest 2 view    Result Date: 9/15/2023  Interpreted By:  REGINO WOODSON MD MRN: [id number] Patient Name: SABIHA TERRY  STUDY: TH CHEST 2 VIEW PA AND LAT;  9/14/2023 1:00 pm  INDICATION: XR    CHEST      L59.8.  COMPARISON: Chest radiograph 06/23/2022  ACCESSION NUMBER(S): [id number]  ORDERING CLINICIAN: ASHLEE SKINNER  FINDINGS:   CARDIOMEDIASTINAL SILHOUETTE: Cardiomediastinal silhouette is unchanged.  LUNGS: No consolidation, pneumothorax, or effusion.  ABDOMEN: No remarkable upper abdominal findings.  BONES: No acute osseous changes.  Remaining findings appear stable since prior comparison radiograph.      1.  No evidence of acute cardiopulmonary process.       Assessment/Plan   54 yo woman s/p right maxillectomy scapula free flap for Y7yM4E9 scca, adjuvant chemorad  Now with ORN of the mandible    -follow up cultures  -cont HBO for now  -rx for augmentin  -will see pt next week, if not improved, will consider admitting for IV abx, possible wash out         Jay Delgado MD

## 2023-10-10 ENCOUNTER — OFFICE VISIT (OUTPATIENT)
Dept: WOUND CARE | Facility: CLINIC | Age: 53
End: 2023-10-10
Payer: COMMERCIAL

## 2023-10-11 ENCOUNTER — OFFICE VISIT (OUTPATIENT)
Dept: WOUND CARE | Facility: CLINIC | Age: 53
End: 2023-10-11
Payer: COMMERCIAL

## 2023-10-11 LAB
BACTERIA SPEC CULT: NORMAL
GRAM STN SPEC: NORMAL
GRAM STN SPEC: NORMAL

## 2023-10-11 PROCEDURE — 99183 HYPERBARIC OXYGEN THERAPY: CPT | Performed by: NURSE PRACTITIONER

## 2023-10-13 ENCOUNTER — TELEPHONE (OUTPATIENT)
Dept: OTOLARYNGOLOGY | Facility: CLINIC | Age: 53
End: 2023-10-13
Payer: COMMERCIAL

## 2023-10-13 ENCOUNTER — CLINICAL SUPPORT (OUTPATIENT)
Dept: WOUND CARE | Facility: CLINIC | Age: 53
End: 2023-10-13
Payer: COMMERCIAL

## 2023-10-13 PROCEDURE — 99183 HYPERBARIC OXYGEN THERAPY: CPT | Performed by: NURSE PRACTITIONER

## 2023-10-16 ENCOUNTER — APPOINTMENT (OUTPATIENT)
Dept: WOUND CARE | Facility: CLINIC | Age: 53
End: 2023-10-16
Payer: COMMERCIAL

## 2023-10-18 ENCOUNTER — OFFICE VISIT (OUTPATIENT)
Dept: OTOLARYNGOLOGY | Facility: HOSPITAL | Age: 53
End: 2023-10-18
Payer: COMMERCIAL

## 2023-10-18 ENCOUNTER — PHARMACY VISIT (OUTPATIENT)
Dept: PHARMACY | Facility: CLINIC | Age: 53
End: 2023-10-18
Payer: MEDICAID

## 2023-10-18 VITALS — BODY MASS INDEX: 31.24 KG/M2 | WEIGHT: 176.3 LBS | TEMPERATURE: 98.2 F | HEIGHT: 63 IN
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
## 2023-10-18 DIAGNOSIS — M27.2 OSTEORADIONECROSIS OF MANDIBLE: ICD-10-CM

## 2023-10-18 DIAGNOSIS — H91.90 HEARING LOSS, UNSPECIFIED HEARING LOSS TYPE, UNSPECIFIED LATERALITY: Primary | ICD-10-CM

## 2023-10-18 PROCEDURE — 99213 OFFICE O/P EST LOW 20 MIN: CPT | Performed by: OTOLARYNGOLOGY

## 2023-10-18 PROCEDURE — 1036F TOBACCO NON-USER: CPT | Performed by: OTOLARYNGOLOGY

## 2023-10-18 RX ORDER — ACETAMINOPHEN 160 MG/5ML
LIQUID ORAL
COMMUNITY
Start: 2022-03-24 | End: 2023-11-05 | Stop reason: HOSPADM

## 2023-10-18 RX ORDER — LACTOSE-REDUCED FOOD 0.06G-1/ML
1 LIQUID (ML) ORAL DAILY
Status: ON HOLD | COMMUNITY
Start: 2022-04-13 | End: 2023-11-05 | Stop reason: SDUPTHER

## 2023-10-18 RX ORDER — OMEPRAZOLE 20 MG/1
40 CAPSULE, DELAYED RELEASE ORAL DAILY
Status: ON HOLD | COMMUNITY
Start: 2022-01-10 | End: 2023-11-05 | Stop reason: SDUPTHER

## 2023-10-18 RX ORDER — AMLODIPINE BESYLATE 10 MG/1
TABLET ORAL
COMMUNITY
Start: 2022-01-10 | End: 2023-11-05 | Stop reason: HOSPADM

## 2023-10-18 RX ORDER — TERAZOSIN 5 MG/1
5 CAPSULE ORAL
Status: ON HOLD | COMMUNITY
End: 2023-11-05 | Stop reason: SDUPTHER

## 2023-10-18 RX ORDER — SUMATRIPTAN 50 MG/1
50 TABLET, FILM COATED ORAL AS NEEDED
Status: ON HOLD | COMMUNITY
End: 2023-11-05 | Stop reason: SDUPTHER

## 2023-10-18 RX ORDER — DOCUSATE SODIUM 50 MG/5ML
LIQUID ORAL
Status: ON HOLD | COMMUNITY
Start: 2022-03-24 | End: 2023-11-02 | Stop reason: ALTCHOICE

## 2023-10-18 RX ORDER — ASCORBIC ACID
CRYSTALS ORAL
Status: ON HOLD | COMMUNITY
Start: 2023-06-12 | End: 2023-11-02 | Stop reason: SDUPTHER

## 2023-10-18 RX ORDER — SPIRONOLACTONE 25 MG/1
TABLET ORAL
Status: ON HOLD | COMMUNITY
Start: 2022-06-05 | End: 2023-11-05 | Stop reason: SDUPTHER

## 2023-10-18 RX ORDER — OXYCODONE HCL 5 MG/5 ML
SOLUTION, ORAL ORAL
COMMUNITY
Start: 2022-03-25 | End: 2023-11-05 | Stop reason: HOSPADM

## 2023-10-18 RX ORDER — GABAPENTIN 250 MG/5ML
5 SOLUTION ORAL DAILY
Status: ON HOLD | COMMUNITY
Start: 2022-03-24 | End: 2023-11-05 | Stop reason: SDUPTHER

## 2023-10-18 RX ORDER — FAMOTIDINE 20 MG/1
TABLET, FILM COATED ORAL
Status: ON HOLD | COMMUNITY
Start: 2022-03-24 | End: 2023-11-05 | Stop reason: SDUPTHER

## 2023-10-18 RX ORDER — LIDOCAINE 50 MG/G
1 PATCH TOPICAL
Status: ON HOLD | COMMUNITY
Start: 2021-02-17 | End: 2023-11-02 | Stop reason: ALTCHOICE

## 2023-10-18 RX ORDER — HYDROCORTISONE ACETATE 25 MG/1
SUPPOSITORY RECTAL 2 TIMES DAILY
Status: ON HOLD | COMMUNITY
End: 2023-11-02 | Stop reason: ALTCHOICE

## 2023-10-18 RX ORDER — AMOXICILLIN AND CLAVULANATE POTASSIUM 875; 125 MG/1; MG/1
1 TABLET, FILM COATED ORAL 2 TIMES DAILY
Qty: 14 TABLET | Refills: 0 | Status: ON HOLD | OUTPATIENT
Start: 2023-10-18 | End: 2023-11-02 | Stop reason: ALTCHOICE

## 2023-10-18 RX ORDER — METOPROLOL TARTRATE 25 MG/1
TABLET, FILM COATED ORAL
Status: ON HOLD | COMMUNITY
Start: 2022-03-24 | End: 2023-11-02 | Stop reason: ALTCHOICE

## 2023-10-18 RX ORDER — SENNOSIDES 8.6 MG/1
TABLET ORAL
Status: ON HOLD | COMMUNITY
Start: 2022-03-24 | End: 2023-11-02 | Stop reason: ALTCHOICE

## 2023-10-18 RX ORDER — PRAVASTATIN SODIUM 40 MG/1
TABLET ORAL
Status: ON HOLD | COMMUNITY
Start: 2022-01-22 | End: 2023-11-02 | Stop reason: SDUPTHER

## 2023-10-18 ASSESSMENT — ENCOUNTER SYMPTOMS
OCCASIONAL FEELINGS OF UNSTEADINESS: 1
DEPRESSION: 0
LOSS OF SENSATION IN FEET: 0

## 2023-10-18 ASSESSMENT — PATIENT HEALTH QUESTIONNAIRE - PHQ9
SUM OF ALL RESPONSES TO PHQ9 QUESTIONS 1 & 2: 0
2. FEELING DOWN, DEPRESSED OR HOPELESS: NOT AT ALL
1. LITTLE INTEREST OR PLEASURE IN DOING THINGS: NOT AT ALL

## 2023-10-18 NOTE — PROGRESS NOTES
"ENT Follow up Visit    Chief Complaint: Neck drainage    History Of Present Illness  Pamella Rose is a 53 y.o. female presents for follow up.  She had a N9nK2W2 right maxillary cancer s/p resection with scapula free flap. Had to have a revision scapula due to loss of first flap. She has been dealing with ORN of the mandible. Is on HBO, received 4 treatments. Neck started drainage 2 weeks ago. Has been on antibiotics. No longer draining. Tolerating abx and HBO. Feels hearing decreased but no pain/pressure    Past Medical History  She has no past medical history on file.    Surgical History  She has a past surgical history that includes  section, classic (2016); IR intervention venous sampling (2021); and IR intervention venous sampling (2021).     Social History  She reports that she has been smoking cigarettes. She has been smoking an average of .25 packs per day. She has never been exposed to tobacco smoke. She has never used smokeless tobacco. She reports that she does not drink alcohol and does not use drugs.    Family History  Family History   Problem Relation Name Age of Onset    Other (CABG) Mother      Colon cancer Other grandfather         Allergies  Patient has no known allergies.    Review of Systems     Exam:  NAD alert  bharti eomi NCAT  mild ectropion  facial swelling stable  bl eac and TMs clear  not much movement midface  significant radiation pigmentation and thickening to the facial skin  Prior area of drainage has healed, no purulence expressed  no obvious NEHEMIAS  ocop: No exposed bone, normal mucosa exam, stable pinpoint hole in midline palate; non tender to palpation over mandible     Last Recorded Vitals  Temperature 36.8 °C (98.2 °F), height 1.6 m (5' 3\"), weight 80 kg (176 lb 4.8 oz).    Result Date: 2023  Normal sinus rhythm Normal ECG When compared with ECG of 10-MAR-2022 13:39, Nonspecific T wave abnormality no longer evident in Inferior leads T wave inversion no " longer evident in Lateral leads Confirmed by Kiet White ([id number]) on 9/25/2023 8:22:47 AM    XR chest 2 view    Result Date: 9/15/2023  Interpreted By:  REGINO WOODSON MD MRN: [id number] Patient Name: SABIHA TERRY  STUDY: TH CHEST 2 VIEW PA AND LAT;  9/14/2023 1:00 pm  INDICATION: XR    CHEST      L59.8.  COMPARISON: Chest radiograph 06/23/2022  ACCESSION NUMBER(S): [id number]  ORDERING CLINICIAN: ASHLEE SKINNER  FINDINGS:   CARDIOMEDIASTINAL SILHOUETTE: Cardiomediastinal silhouette is unchanged.  LUNGS: No consolidation, pneumothorax, or effusion.  ABDOMEN: No remarkable upper abdominal findings.  BONES: No acute osseous changes.  Remaining findings appear stable since prior comparison radiograph.      1.  No evidence of acute cardiopulmonary process.       Assessment/Plan   52 yo woman s/p right maxillectomy scapula free flap for T1tY9O3 scca, adjuvant chemorad  Now with ORN of the mandible    -cont abx  -cont hbo  -RTC after completion of therapy in 1 month    Jay Delgado MD

## 2023-10-19 ENCOUNTER — OFFICE VISIT (OUTPATIENT)
Dept: WOUND CARE | Facility: CLINIC | Age: 53
End: 2023-10-19
Payer: COMMERCIAL

## 2023-10-19 PROCEDURE — 99183 HYPERBARIC OXYGEN THERAPY: CPT | Performed by: SURGERY

## 2023-10-19 PROCEDURE — G0277 HBOT, FULL BODY CHAMBER, 30M: HCPCS

## 2023-10-23 ENCOUNTER — APPOINTMENT (OUTPATIENT)
Dept: WOUND CARE | Facility: CLINIC | Age: 53
End: 2023-10-23
Payer: COMMERCIAL

## 2023-10-26 ENCOUNTER — OFFICE VISIT (OUTPATIENT)
Dept: WOUND CARE | Facility: CLINIC | Age: 53
End: 2023-10-26
Payer: COMMERCIAL

## 2023-10-27 ENCOUNTER — APPOINTMENT (OUTPATIENT)
Dept: WOUND CARE | Facility: CLINIC | Age: 53
End: 2023-10-27
Payer: COMMERCIAL

## 2023-10-30 ENCOUNTER — CLINICAL SUPPORT (OUTPATIENT)
Dept: WOUND CARE | Facility: CLINIC | Age: 53
End: 2023-10-30
Payer: COMMERCIAL

## 2023-10-30 PROCEDURE — 99183 HYPERBARIC OXYGEN THERAPY: CPT | Performed by: NURSE PRACTITIONER

## 2023-10-30 PROCEDURE — G0277 HBOT, FULL BODY CHAMBER, 30M: HCPCS

## 2023-10-31 ENCOUNTER — OFFICE VISIT (OUTPATIENT)
Dept: WOUND CARE | Facility: CLINIC | Age: 53
End: 2023-10-31
Payer: COMMERCIAL

## 2023-10-31 ENCOUNTER — SOCIAL WORK (OUTPATIENT)
Dept: CASE MANAGEMENT | Facility: HOSPITAL | Age: 53
End: 2023-10-31
Payer: COMMERCIAL

## 2023-10-31 PROCEDURE — G0277 HBOT, FULL BODY CHAMBER, 30M: HCPCS

## 2023-10-31 NOTE — PROGRESS NOTES
This covering SW rec'd call from pt's carson Esther inquiring about home care services for pt. She said she had heard that if a doctor orders it then they can get aides to assist in the home. As this WILMER surmised that pt's carson was referring to waiver services, since pt has Medicaid insurance, WILMER explained referral process. Esther to call and speak with pt's [facility]  to initiate application for home services. Carson appreciative of assistance provided.   WILMER to turn over to new clinic WILMER.

## 2023-11-01 ENCOUNTER — HOSPITAL ENCOUNTER (INPATIENT)
Facility: HOSPITAL | Age: 53
LOS: 4 days | Discharge: HOME | End: 2023-11-05
Attending: OTOLARYNGOLOGY | Admitting: OTOLARYNGOLOGY
Payer: COMMERCIAL

## 2023-11-01 DIAGNOSIS — K21.9 REFLUX LARYNGITIS: ICD-10-CM

## 2023-11-01 DIAGNOSIS — F32.1 MAJOR DEPRESSIVE DISORDER, SINGLE EPISODE, MODERATE (MULTI): ICD-10-CM

## 2023-11-01 DIAGNOSIS — I12.9 HYPERTENSIVE RENAL DISEASE: ICD-10-CM

## 2023-11-01 DIAGNOSIS — I10 HYPERTENSION, UNSPECIFIED TYPE: ICD-10-CM

## 2023-11-01 DIAGNOSIS — M27.2 OSTEORADIONECROSIS OF JAW: ICD-10-CM

## 2023-11-01 DIAGNOSIS — M79.2 NEUROPATHIC PAIN: ICD-10-CM

## 2023-11-01 DIAGNOSIS — R07.9 CHEST PAIN, UNSPECIFIED TYPE: ICD-10-CM

## 2023-11-01 DIAGNOSIS — E03.8 OTHER SPECIFIED HYPOTHYROIDISM: ICD-10-CM

## 2023-11-01 DIAGNOSIS — D35.00 ADRENAL ADENOMA, UNSPECIFIED LATERALITY: ICD-10-CM
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
## 2023-11-01 DIAGNOSIS — M27.2 OSTEORADIONECROSIS OF MANDIBLE: Primary | ICD-10-CM
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
## 2023-11-01 DIAGNOSIS — J04.0 REFLUX LARYNGITIS: ICD-10-CM

## 2023-11-01 LAB
ALBUMIN SERPL BCP-MCNC: 4 G/DL (ref 3.4–5)
ANION GAP SERPL CALC-SCNC: 15 MMOL/L (ref 10–20)
BUN SERPL-MCNC: 26 MG/DL (ref 6–23)
CALCIUM SERPL-MCNC: 10.3 MG/DL (ref 8.6–10.6)
CHLORIDE SERPL-SCNC: 102 MMOL/L (ref 98–107)
CO2 SERPL-SCNC: 25 MMOL/L (ref 21–32)
CREAT SERPL-MCNC: 1.18 MG/DL (ref 0.5–1.05)
ERYTHROCYTE [DISTWIDTH] IN BLOOD BY AUTOMATED COUNT: 16.8 % (ref 11.5–14.5)
GFR SERPL CREATININE-BSD FRML MDRD: 55 ML/MIN/1.73M*2
GLUCOSE BLD MANUAL STRIP-MCNC: 136 MG/DL (ref 74–99)
GLUCOSE SERPL-MCNC: 114 MG/DL (ref 74–99)
HCT VFR BLD AUTO: 38.1 % (ref 36–46)
HGB BLD-MCNC: 12.1 G/DL (ref 12–16)
MAGNESIUM SERPL-MCNC: 2.27 MG/DL (ref 1.6–2.4)
MCH RBC QN AUTO: 28.1 PG (ref 26–34)
MCHC RBC AUTO-ENTMCNC: 31.8 G/DL (ref 32–36)
MCV RBC AUTO: 89 FL (ref 80–100)
NRBC BLD-RTO: 0 /100 WBCS (ref 0–0)
PHOSPHATE SERPL-MCNC: 3 MG/DL (ref 2.5–4.9)
PLATELET # BLD AUTO: 212 X10*3/UL (ref 150–450)
POTASSIUM SERPL-SCNC: 5.2 MMOL/L (ref 3.5–5.3)
RBC # BLD AUTO: 4.3 X10*6/UL (ref 4–5.2)
SODIUM SERPL-SCNC: 137 MMOL/L (ref 136–145)
WBC # BLD AUTO: 4.4 X10*3/UL (ref 4.4–11.3)

## 2023-11-01 PROCEDURE — 2500000004 HC RX 250 GENERAL PHARMACY W/ HCPCS (ALT 636 FOR OP/ED): Performed by: STUDENT IN AN ORGANIZED HEALTH CARE EDUCATION/TRAINING PROGRAM

## 2023-11-01 PROCEDURE — 84100 ASSAY OF PHOSPHORUS: CPT | Performed by: STUDENT IN AN ORGANIZED HEALTH CARE EDUCATION/TRAINING PROGRAM

## 2023-11-01 PROCEDURE — 87181 SC STD AGAR DILUTION PER AGT: CPT

## 2023-11-01 PROCEDURE — 85027 COMPLETE CBC AUTOMATED: CPT | Performed by: STUDENT IN AN ORGANIZED HEALTH CARE EDUCATION/TRAINING PROGRAM

## 2023-11-01 PROCEDURE — 83735 ASSAY OF MAGNESIUM: CPT | Performed by: STUDENT IN AN ORGANIZED HEALTH CARE EDUCATION/TRAINING PROGRAM

## 2023-11-01 PROCEDURE — 36415 COLL VENOUS BLD VENIPUNCTURE: CPT | Performed by: STUDENT IN AN ORGANIZED HEALTH CARE EDUCATION/TRAINING PROGRAM

## 2023-11-01 PROCEDURE — 80069 RENAL FUNCTION PANEL: CPT | Performed by: STUDENT IN AN ORGANIZED HEALTH CARE EDUCATION/TRAINING PROGRAM

## 2023-11-01 PROCEDURE — 82947 ASSAY GLUCOSE BLOOD QUANT: CPT

## 2023-11-01 PROCEDURE — 96372 THER/PROPH/DIAG INJ SC/IM: CPT | Performed by: STUDENT IN AN ORGANIZED HEALTH CARE EDUCATION/TRAINING PROGRAM

## 2023-11-01 PROCEDURE — 2500000001 HC RX 250 WO HCPCS SELF ADMINISTERED DRUGS (ALT 637 FOR MEDICARE OP): Performed by: STUDENT IN AN ORGANIZED HEALTH CARE EDUCATION/TRAINING PROGRAM

## 2023-11-01 PROCEDURE — 1170000001 HC PRIVATE ONCOLOGY ROOM DAILY

## 2023-11-01 RX ORDER — LEVOTHYROXINE SODIUM 100 UG/1
100 TABLET ORAL EVERY MORNING
Status: DISCONTINUED | OUTPATIENT
Start: 2023-11-02 | End: 2023-11-05 | Stop reason: HOSPADM

## 2023-11-01 RX ORDER — PENTOXIFYLLINE 400 MG/1
400 TABLET, EXTENDED RELEASE ORAL
Status: DISCONTINUED | OUTPATIENT
Start: 2023-11-01 | End: 2023-11-05 | Stop reason: HOSPADM

## 2023-11-01 RX ORDER — POLYETHYLENE GLYCOL 3350 17 G/17G
17 POWDER, FOR SOLUTION ORAL DAILY
Status: DISCONTINUED | OUTPATIENT
Start: 2023-11-01 | End: 2023-11-05 | Stop reason: HOSPADM

## 2023-11-01 RX ORDER — PANTOPRAZOLE SODIUM 40 MG/1
40 TABLET, DELAYED RELEASE ORAL
Status: DISCONTINUED | OUTPATIENT
Start: 2023-11-02 | End: 2023-11-05 | Stop reason: HOSPADM

## 2023-11-01 RX ORDER — ONDANSETRON HYDROCHLORIDE 2 MG/ML
4 INJECTION, SOLUTION INTRAVENOUS EVERY 8 HOURS PRN
Status: DISCONTINUED | OUTPATIENT
Start: 2023-11-01 | End: 2023-11-05 | Stop reason: HOSPADM

## 2023-11-01 RX ORDER — ACETAMINOPHEN 160 MG/5ML
1000 SOLUTION ORAL EVERY 8 HOURS SCHEDULED
Status: DISCONTINUED | OUTPATIENT
Start: 2023-11-01 | End: 2023-11-05 | Stop reason: HOSPADM

## 2023-11-01 RX ORDER — LEVOTHYROXINE SODIUM 100 UG/1
100 TABLET ORAL
Status: DISCONTINUED | OUTPATIENT
Start: 2023-11-02 | End: 2023-11-01

## 2023-11-01 RX ORDER — ONDANSETRON 4 MG/1
4 TABLET, FILM COATED ORAL EVERY 8 HOURS PRN
Status: DISCONTINUED | OUTPATIENT
Start: 2023-11-01 | End: 2023-11-05 | Stop reason: HOSPADM

## 2023-11-01 RX ORDER — LISINOPRIL 40 MG/1
40 TABLET ORAL DAILY
Status: DISCONTINUED | OUTPATIENT
Start: 2023-11-01 | End: 2023-11-05 | Stop reason: HOSPADM

## 2023-11-01 RX ORDER — SPIRONOLACTONE 25 MG/1
25 TABLET ORAL DAILY
Status: DISCONTINUED | OUTPATIENT
Start: 2023-11-01 | End: 2023-11-05 | Stop reason: HOSPADM

## 2023-11-01 RX ORDER — ACETAMINOPHEN 325 MG/1
975 TABLET ORAL EVERY 8 HOURS SCHEDULED
Status: DISCONTINUED | OUTPATIENT
Start: 2023-11-01 | End: 2023-11-05 | Stop reason: HOSPADM

## 2023-11-01 RX ORDER — AMLODIPINE BESYLATE 10 MG/1
10 TABLET ORAL DAILY
Status: DISCONTINUED | OUTPATIENT
Start: 2023-11-01 | End: 2023-11-05 | Stop reason: HOSPADM

## 2023-11-01 RX ORDER — HEPARIN SODIUM 5000 [USP'U]/ML
5000 INJECTION, SOLUTION INTRAVENOUS; SUBCUTANEOUS EVERY 8 HOURS
Status: DISCONTINUED | OUTPATIENT
Start: 2023-11-01 | End: 2023-11-05 | Stop reason: HOSPADM

## 2023-11-01 RX ORDER — NALOXONE HYDROCHLORIDE 0.4 MG/ML
0.2 INJECTION, SOLUTION INTRAMUSCULAR; INTRAVENOUS; SUBCUTANEOUS EVERY 5 MIN PRN
Status: DISCONTINUED | OUTPATIENT
Start: 2023-11-01 | End: 2023-11-05 | Stop reason: HOSPADM

## 2023-11-01 RX ORDER — DEXTROSE MONOHYDRATE AND SODIUM CHLORIDE 5; .45 G/100ML; G/100ML
75 INJECTION, SOLUTION INTRAVENOUS CONTINUOUS
Status: DISCONTINUED | OUTPATIENT
Start: 2023-11-01 | End: 2023-11-01

## 2023-11-01 RX ORDER — SODIUM CHLORIDE, SODIUM LACTATE, POTASSIUM CHLORIDE, CALCIUM CHLORIDE 600; 310; 30; 20 MG/100ML; MG/100ML; MG/100ML; MG/100ML
80 INJECTION, SOLUTION INTRAVENOUS CONTINUOUS
Status: DISCONTINUED | OUTPATIENT
Start: 2023-11-01 | End: 2023-11-02

## 2023-11-01 RX ORDER — SUMATRIPTAN 50 MG/1
50 TABLET, FILM COATED ORAL ONCE AS NEEDED
Status: DISCONTINUED | OUTPATIENT
Start: 2023-11-01 | End: 2023-11-05 | Stop reason: HOSPADM

## 2023-11-01 RX ORDER — CHLORHEXIDINE GLUCONATE ORAL RINSE 1.2 MG/ML
15 SOLUTION DENTAL 3 TIMES DAILY
Status: DISCONTINUED | OUTPATIENT
Start: 2023-11-01 | End: 2023-11-02

## 2023-11-01 RX ORDER — HYDROCHLOROTHIAZIDE 25 MG/1
25 TABLET ORAL DAILY
Status: DISCONTINUED | OUTPATIENT
Start: 2023-11-01 | End: 2023-11-05 | Stop reason: HOSPADM

## 2023-11-01 RX ORDER — AMOXICILLIN 250 MG
2 CAPSULE ORAL 2 TIMES DAILY
Status: DISCONTINUED | OUTPATIENT
Start: 2023-11-01 | End: 2023-11-05 | Stop reason: HOSPADM

## 2023-11-01 RX ORDER — OLANZAPINE 5 MG/1
5 TABLET ORAL NIGHTLY
Status: DISCONTINUED | OUTPATIENT
Start: 2023-11-01 | End: 2023-11-05 | Stop reason: HOSPADM

## 2023-11-01 RX ORDER — FLUOXETINE HYDROCHLORIDE 40 MG/1
40 CAPSULE ORAL DAILY
Status: DISCONTINUED | OUTPATIENT
Start: 2023-11-01 | End: 2023-11-02

## 2023-11-01 RX ORDER — METOPROLOL TARTRATE 25 MG/1
25 TABLET, FILM COATED ORAL 2 TIMES DAILY
Status: DISCONTINUED | OUTPATIENT
Start: 2023-11-01 | End: 2023-11-02

## 2023-11-01 RX ADMIN — AMPICILLIN SODIUM AND SULBACTAM SODIUM 3 G: 2; 1 INJECTION, POWDER, FOR SOLUTION INTRAMUSCULAR; INTRAVENOUS at 23:19

## 2023-11-01 RX ADMIN — HEPARIN SODIUM 5000 UNITS: 5000 INJECTION INTRAVENOUS; SUBCUTANEOUS at 18:55

## 2023-11-01 RX ADMIN — AMPICILLIN SODIUM AND SULBACTAM SODIUM 3 G: 2; 1 INJECTION, POWDER, FOR SOLUTION INTRAMUSCULAR; INTRAVENOUS at 18:55

## 2023-11-01 RX ADMIN — SODIUM CHLORIDE, POTASSIUM CHLORIDE, SODIUM LACTATE AND CALCIUM CHLORIDE 80 ML/HR: 600; 310; 30; 20 INJECTION, SOLUTION INTRAVENOUS at 18:56

## 2023-11-01 RX ADMIN — HYDROCHLOROTHIAZIDE 25 MG: 25 TABLET ORAL at 18:55

## 2023-11-01 RX ADMIN — FLUOXETINE HYDROCHLORIDE 40 MG: 40 CAPSULE ORAL at 23:16

## 2023-11-01 RX ADMIN — AMLODIPINE BESYLATE 10 MG: 10 TABLET ORAL at 18:55

## 2023-11-01 RX ADMIN — CHLORHEXIDINE GLUCONATE 15 ML: 1.2 SOLUTION ORAL at 20:37

## 2023-11-01 RX ADMIN — OLANZAPINE 5 MG: 5 TABLET, FILM COATED ORAL at 20:38

## 2023-11-01 RX ADMIN — LISINOPRIL 40 MG: 40 TABLET ORAL at 20:38

## 2023-11-01 RX ADMIN — METOPROLOL TARTRATE 25 MG: 25 TABLET, FILM COATED ORAL at 20:38

## 2023-11-01 RX ADMIN — SPIRONOLACTONE 25 MG: 25 TABLET ORAL at 18:55

## 2023-11-01 RX ADMIN — SENNOSIDES AND DOCUSATE SODIUM 2 TABLET: 8.6; 5 TABLET ORAL at 20:38

## 2023-11-01 RX ADMIN — ACETAMINOPHEN 975 MG: 325 TABLET ORAL at 23:19

## 2023-11-01 SDOH — SOCIAL STABILITY: SOCIAL INSECURITY: HAS ANYONE EVER THREATENED TO HURT YOUR FAMILY OR YOUR PETS?: NO

## 2023-11-01 SDOH — SOCIAL STABILITY: SOCIAL INSECURITY: DO YOU FEEL ANYONE HAS EXPLOITED OR TAKEN ADVANTAGE OF YOU FINANCIALLY OR OF YOUR PERSONAL PROPERTY?: NO

## 2023-11-01 SDOH — SOCIAL STABILITY: SOCIAL INSECURITY: HAVE YOU HAD THOUGHTS OF HARMING ANYONE ELSE?: NO

## 2023-11-01 SDOH — SOCIAL STABILITY: SOCIAL INSECURITY: WERE YOU ABLE TO COMPLETE ALL THE BEHAVIORAL HEALTH SCREENINGS?: YES

## 2023-11-01 SDOH — SOCIAL STABILITY: SOCIAL INSECURITY: ABUSE: ADULT

## 2023-11-01 SDOH — SOCIAL STABILITY: SOCIAL INSECURITY: DOES ANYONE TRY TO KEEP YOU FROM HAVING/CONTACTING OTHER FRIENDS OR DOING THINGS OUTSIDE YOUR HOME?: NO

## 2023-11-01 SDOH — SOCIAL STABILITY: SOCIAL INSECURITY: ARE YOU OR HAVE YOU BEEN THREATENED OR ABUSED PHYSICALLY, EMOTIONALLY, OR SEXUALLY BY ANYONE?: NO

## 2023-11-01 SDOH — SOCIAL STABILITY: SOCIAL INSECURITY: DO YOU FEEL UNSAFE GOING BACK TO THE PLACE WHERE YOU ARE LIVING?: NO

## 2023-11-01 SDOH — SOCIAL STABILITY: SOCIAL INSECURITY: ARE THERE ANY APPARENT SIGNS OF INJURIES/BEHAVIORS THAT COULD BE RELATED TO ABUSE/NEGLECT?: NO

## 2023-11-01 ASSESSMENT — COGNITIVE AND FUNCTIONAL STATUS - GENERAL
MOBILITY SCORE: 24
PATIENT BASELINE BEDBOUND: NO
DAILY ACTIVITIY SCORE: 24
DAILY ACTIVITIY SCORE: 24
MOBILITY SCORE: 24

## 2023-11-01 ASSESSMENT — LIFESTYLE VARIABLES
SUBSTANCE_ABUSE_PAST_12_MONTHS: NO
AUDIT-C TOTAL SCORE: 1
PRESCIPTION_ABUSE_PAST_12_MONTHS: NO
SKIP TO QUESTIONS 9-10: 1
HOW OFTEN DO YOU HAVE A DRINK CONTAINING ALCOHOL: MONTHLY OR LESS
HOW MANY STANDARD DRINKS CONTAINING ALCOHOL DO YOU HAVE ON A TYPICAL DAY: 1 OR 2
HOW OFTEN DO YOU HAVE 6 OR MORE DRINKS ON ONE OCCASION: NEVER
AUDIT-C TOTAL SCORE: 1

## 2023-11-01 ASSESSMENT — PAIN - FUNCTIONAL ASSESSMENT: PAIN_FUNCTIONAL_ASSESSMENT: 0-10

## 2023-11-01 ASSESSMENT — ACTIVITIES OF DAILY LIVING (ADL)
GROOMING: INDEPENDENT
JUDGMENT_ADEQUATE_SAFELY_COMPLETE_DAILY_ACTIVITIES: NO
PATIENT'S MEMORY ADEQUATE TO SAFELY COMPLETE DAILY ACTIVITIES?: NO
ADEQUATE_TO_COMPLETE_ADL: NO
HEARING - RIGHT EAR: FUNCTIONAL
HEARING - LEFT EAR: FUNCTIONAL
DRESSING YOURSELF: INDEPENDENT
TOILETING: INDEPENDENT
LACK_OF_TRANSPORTATION: NO
WALKS IN HOME: INDEPENDENT
FEEDING YOURSELF: INDEPENDENT
BATHING: INDEPENDENT

## 2023-11-01 ASSESSMENT — PAIN SCALES - GENERAL
PAINLEVEL_OUTOF10: 0 - NO PAIN

## 2023-11-02 ENCOUNTER — APPOINTMENT (OUTPATIENT)
Dept: RADIOLOGY | Facility: HOSPITAL | Age: 53
End: 2023-11-02
Payer: COMMERCIAL

## 2023-11-02 ENCOUNTER — APPOINTMENT (OUTPATIENT)
Dept: WOUND CARE | Facility: CLINIC | Age: 53
End: 2023-11-02
Payer: COMMERCIAL

## 2023-11-02 LAB
ALBUMIN SERPL BCP-MCNC: 3.7 G/DL (ref 3.4–5)
ANION GAP SERPL CALC-SCNC: 14 MMOL/L (ref 10–20)
BUN SERPL-MCNC: 23 MG/DL (ref 6–23)
CALCIUM SERPL-MCNC: 9.8 MG/DL (ref 8.6–10.6)
CHLORIDE SERPL-SCNC: 101 MMOL/L (ref 98–107)
CO2 SERPL-SCNC: 28 MMOL/L (ref 21–32)
CREAT SERPL-MCNC: 1.07 MG/DL (ref 0.5–1.05)
ERYTHROCYTE [DISTWIDTH] IN BLOOD BY AUTOMATED COUNT: 16.9 % (ref 11.5–14.5)
GFR SERPL CREATININE-BSD FRML MDRD: 61 ML/MIN/1.73M*2
GLUCOSE BLD MANUAL STRIP-MCNC: 110 MG/DL (ref 74–99)
GLUCOSE BLD MANUAL STRIP-MCNC: 111 MG/DL (ref 74–99)
GLUCOSE SERPL-MCNC: 108 MG/DL (ref 74–99)
HCT VFR BLD AUTO: 39.6 % (ref 36–46)
HGB BLD-MCNC: 11.8 G/DL (ref 12–16)
MAGNESIUM SERPL-MCNC: 2.18 MG/DL (ref 1.6–2.4)
MCH RBC QN AUTO: 27.8 PG (ref 26–34)
MCHC RBC AUTO-ENTMCNC: 29.8 G/DL (ref 32–36)
MCV RBC AUTO: 93 FL (ref 80–100)
NRBC BLD-RTO: 0 /100 WBCS (ref 0–0)
PHOSPHATE SERPL-MCNC: 3.9 MG/DL (ref 2.5–4.9)
PLATELET # BLD AUTO: 182 X10*3/UL (ref 150–450)
POTASSIUM SERPL-SCNC: 4.2 MMOL/L (ref 3.5–5.3)
RBC # BLD AUTO: 4.24 X10*6/UL (ref 4–5.2)
SODIUM SERPL-SCNC: 139 MMOL/L (ref 136–145)
WBC # BLD AUTO: 3.3 X10*3/UL (ref 4.4–11.3)

## 2023-11-02 PROCEDURE — 36415 COLL VENOUS BLD VENIPUNCTURE: CPT | Performed by: STUDENT IN AN ORGANIZED HEALTH CARE EDUCATION/TRAINING PROGRAM

## 2023-11-02 PROCEDURE — 85027 COMPLETE CBC AUTOMATED: CPT | Performed by: STUDENT IN AN ORGANIZED HEALTH CARE EDUCATION/TRAINING PROGRAM

## 2023-11-02 PROCEDURE — 99222 1ST HOSP IP/OBS MODERATE 55: CPT | Performed by: STUDENT IN AN ORGANIZED HEALTH CARE EDUCATION/TRAINING PROGRAM

## 2023-11-02 PROCEDURE — 2550000001 HC RX 255 CONTRASTS: Performed by: STUDENT IN AN ORGANIZED HEALTH CARE EDUCATION/TRAINING PROGRAM

## 2023-11-02 PROCEDURE — 94760 N-INVAS EAR/PLS OXIMETRY 1: CPT

## 2023-11-02 PROCEDURE — 2500000004 HC RX 250 GENERAL PHARMACY W/ HCPCS (ALT 636 FOR OP/ED): Performed by: STUDENT IN AN ORGANIZED HEALTH CARE EDUCATION/TRAINING PROGRAM

## 2023-11-02 PROCEDURE — 2500000001 HC RX 250 WO HCPCS SELF ADMINISTERED DRUGS (ALT 637 FOR MEDICARE OP): Performed by: STUDENT IN AN ORGANIZED HEALTH CARE EDUCATION/TRAINING PROGRAM

## 2023-11-02 PROCEDURE — 94760 N-INVAS EAR/PLS OXIMETRY 1: CPT | Performed by: OTOLARYNGOLOGY

## 2023-11-02 PROCEDURE — 99232 SBSQ HOSP IP/OBS MODERATE 35: CPT | Performed by: NURSE PRACTITIONER

## 2023-11-02 PROCEDURE — 99221 1ST HOSP IP/OBS SF/LOW 40: CPT | Performed by: STUDENT IN AN ORGANIZED HEALTH CARE EDUCATION/TRAINING PROGRAM

## 2023-11-02 PROCEDURE — 80069 RENAL FUNCTION PANEL: CPT | Performed by: STUDENT IN AN ORGANIZED HEALTH CARE EDUCATION/TRAINING PROGRAM

## 2023-11-02 PROCEDURE — 70491 CT SOFT TISSUE NECK W/DYE: CPT

## 2023-11-02 PROCEDURE — 82947 ASSAY GLUCOSE BLOOD QUANT: CPT

## 2023-11-02 PROCEDURE — 1170000001 HC PRIVATE ONCOLOGY ROOM DAILY

## 2023-11-02 PROCEDURE — 83735 ASSAY OF MAGNESIUM: CPT | Performed by: STUDENT IN AN ORGANIZED HEALTH CARE EDUCATION/TRAINING PROGRAM

## 2023-11-02 PROCEDURE — 70491 CT SOFT TISSUE NECK W/DYE: CPT | Performed by: RADIOLOGY

## 2023-11-02 PROCEDURE — 96372 THER/PROPH/DIAG INJ SC/IM: CPT | Performed by: STUDENT IN AN ORGANIZED HEALTH CARE EDUCATION/TRAINING PROGRAM

## 2023-11-02 RX ORDER — CHLORHEXIDINE GLUCONATE ORAL RINSE 1.2 MG/ML
15 SOLUTION DENTAL
Status: DISCONTINUED | OUTPATIENT
Start: 2023-11-02 | End: 2023-11-05 | Stop reason: HOSPADM

## 2023-11-02 RX ORDER — METOPROLOL SUCCINATE 50 MG/1
50 TABLET, EXTENDED RELEASE ORAL DAILY
Status: ON HOLD | COMMUNITY
End: 2023-11-05 | Stop reason: SDUPTHER

## 2023-11-02 RX ORDER — METOPROLOL SUCCINATE 50 MG/1
50 TABLET, EXTENDED RELEASE ORAL DAILY
Status: DISCONTINUED | OUTPATIENT
Start: 2023-11-02 | End: 2023-11-02

## 2023-11-02 RX ORDER — TERAZOSIN 1 MG/1
5 CAPSULE ORAL EVERY EVENING
Status: DISCONTINUED | OUTPATIENT
Start: 2023-11-02 | End: 2023-11-05 | Stop reason: HOSPADM

## 2023-11-02 RX ORDER — METOPROLOL SUCCINATE 50 MG/1
50 TABLET, EXTENDED RELEASE ORAL DAILY
Status: DISCONTINUED | OUTPATIENT
Start: 2023-11-02 | End: 2023-11-05 | Stop reason: HOSPADM

## 2023-11-02 RX ORDER — FLUOXETINE HYDROCHLORIDE 20 MG/1
20 CAPSULE ORAL DAILY
Status: DISCONTINUED | OUTPATIENT
Start: 2023-11-03 | End: 2023-11-05 | Stop reason: HOSPADM

## 2023-11-02 RX ADMIN — PENTOXIFYLLINE 400 MG: 400 TABLET, EXTENDED RELEASE ORAL at 12:53

## 2023-11-02 RX ADMIN — HYDROCHLOROTHIAZIDE 25 MG: 25 TABLET ORAL at 10:02

## 2023-11-02 RX ADMIN — FLUOXETINE HYDROCHLORIDE 40 MG: 40 CAPSULE ORAL at 10:02

## 2023-11-02 RX ADMIN — LEVOTHYROXINE SODIUM 100 MCG: 100 TABLET ORAL at 10:02

## 2023-11-02 RX ADMIN — AMLODIPINE BESYLATE 10 MG: 10 TABLET ORAL at 10:02

## 2023-11-02 RX ADMIN — HEPARIN SODIUM 5000 UNITS: 5000 INJECTION INTRAVENOUS; SUBCUTANEOUS at 18:59

## 2023-11-02 RX ADMIN — AMPICILLIN SODIUM AND SULBACTAM SODIUM 3 G: 2; 1 INJECTION, POWDER, FOR SOLUTION INTRAMUSCULAR; INTRAVENOUS at 18:59

## 2023-11-02 RX ADMIN — HEPARIN SODIUM 5000 UNITS: 5000 INJECTION INTRAVENOUS; SUBCUTANEOUS at 03:45

## 2023-11-02 RX ADMIN — PENTOXIFYLLINE 400 MG: 400 TABLET, EXTENDED RELEASE ORAL at 10:02

## 2023-11-02 RX ADMIN — IOHEXOL 100 ML: 350 INJECTION, SOLUTION INTRAVENOUS at 01:36

## 2023-11-02 RX ADMIN — SPIRONOLACTONE 25 MG: 25 TABLET ORAL at 10:02

## 2023-11-02 RX ADMIN — LISINOPRIL 40 MG: 40 TABLET ORAL at 10:02

## 2023-11-02 RX ADMIN — TERAZOSIN HYDROCHLORIDE 5 MG: 1 CAPSULE ORAL at 22:44

## 2023-11-02 RX ADMIN — OLANZAPINE 5 MG: 5 TABLET, FILM COATED ORAL at 22:44

## 2023-11-02 RX ADMIN — CHLORHEXIDINE GLUCONATE 15 ML: 1.2 SOLUTION ORAL at 10:03

## 2023-11-02 RX ADMIN — AMPICILLIN SODIUM AND SULBACTAM SODIUM 3 G: 2; 1 INJECTION, POWDER, FOR SOLUTION INTRAMUSCULAR; INTRAVENOUS at 12:53

## 2023-11-02 RX ADMIN — AMPICILLIN SODIUM AND SULBACTAM SODIUM 3 G: 2; 1 INJECTION, POWDER, FOR SOLUTION INTRAMUSCULAR; INTRAVENOUS at 05:42

## 2023-11-02 RX ADMIN — PENTOXIFYLLINE 400 MG: 400 TABLET, EXTENDED RELEASE ORAL at 17:16

## 2023-11-02 RX ADMIN — ACETAMINOPHEN 975 MG: 325 TABLET ORAL at 22:44

## 2023-11-02 RX ADMIN — HEPARIN SODIUM 5000 UNITS: 5000 INJECTION INTRAVENOUS; SUBCUTANEOUS at 10:03

## 2023-11-02 RX ADMIN — METOPROLOL TARTRATE 25 MG: 25 TABLET, FILM COATED ORAL at 10:02

## 2023-11-02 ASSESSMENT — PAIN SCALES - GENERAL
PAINLEVEL_OUTOF10: 4
PAINLEVEL_OUTOF10: 4
PAINLEVEL_OUTOF10: 5 - MODERATE PAIN

## 2023-11-02 NOTE — PROGRESS NOTES
11/02/23 1640   Discharge Planning   Living Arrangements Alone   Support Systems Children;Spouse/significant other   Assistance Needed was indendent, daughter working on getting light house work through [employer]   Type of Residence Private residence     Pt with hx of maxillectomy and reconstruction for oral ca. Pt admitted for swelling and drainage from left jaw. Cultures are pending and ID has been consulted. Met with the pt at the bedside to complete admission assessment. Pt states she lives alone and is mostly independent. Her significant other Oscar spends a lot of time with her. She is looking in to getting [employer] to pay for some light housework. She does not recall having HC services after surgery last March. She is open to receiving HC if needed. I updated to pt on ENT plans and will follow up with home going needs if needed. Care Transitions will continue to follow. Nadege Hunt RN TCC

## 2023-11-02 NOTE — CONSULTS
"Nutrition Assessment    Nutrition Initial Assessment:   Reason for Assessment: Provider consult order    Patient is a 53 y.o. female presenting with osteonecrosis of jaw. She has a hx of \"right maxillary K8rD9C0 SCC s/p right maxillectomy, right SND (Ib-IV) left SND (I-III), dobhoff, recon with L scapula c/b flap failure s/p revision R scapula reconstruction.\"     Nutrition History:   Pt has PEG tube in place. She reports her current at home feeding regimen is 2 cans (480ml) Isosource 1.5 TID for a total of 6 cans a day. She says she gives herself a \"syringe\" of water every time she administers a bolus. She was unable to further elaborate on quantity. She denies any issues with tolerance. She said she started giving herself more TF than before because she has been feeling hungry. She used to try to eat PO but doesn't really anymore.     At home regimen provides 2160kcal, 98g protein, and 1094ml free water. This meets > 100% of her estimated energy and protein needs.     Anthropometrics:  Height: 154.9 cm (5' 0.98\")  Weight: 78.6 kg (173 lb 4.5 oz)  BMI (Calculated): 32.76  IBW/kg (Dietitian Calculated): 47.7 kg  Percent of IBW: 165 %       Objective/Subjective Weight History:     Weight Change %:  Pt reports her usual adult body weight was around 142lbs. She endorses recent weight gain since starting her TF. She is ~ 30lbs higher than her usual adult body weight.     Wt Readings from Last 10 Encounters:   11/02/23 78.6 kg (173 lb 4.5 oz)   10/18/23 80 kg (176 lb 4.8 oz)   10/09/23 79.8 kg (176 lb)   10/05/23 77 kg (169 lb 12.1 oz)   10/05/23 77 kg (169 lb 12.1 oz)   06/16/23 89 kg (196 lb 5 oz)   05/05/23 71.9 kg (158 lb 9 oz)   03/16/23 72.6 kg (160 lb 0.9 oz)   02/24/23 68.4 kg (150 lb 12.7 oz)   02/16/23 68.4 kg (150 lb 12.7 oz)     Nutrition Focused Physical Exam Findings:   Pt appeared overall adequately nourished.     Subcutaneous Fat Loss:   Orbital Fat Pads: Well nourshed (slightly bulging fat pads)   Buccal " Fat Pads: Well nourished (full, rounded cheeks)   Triceps: Well nourished (ample fat tissue)       Muscle Wasting:  Temporalis: Well nourished (well-defined muscle)  Pectoralis (Clavicular Region): Well nourished (clavicle not visible)  Deltoid/Trapezius: Well nourished (rounded appearance at arm, shoulder, neck)  Interosseous: Well nourished (muscle bulges)    Edema:  Edema: none   Physical Findings:   Skin: Negative (right neck wound)    Objective Data:  Nutrition Significant Labs:  reviewed    Nutrition Specific Mediations:  reviewed    I/O:     Intake/Output Summary (Last 24 hours) at 11/2/2023 1203  Last data filed at 11/2/2023 0736  Gross per 24 hour   Intake 1753.33 ml   Output 0 ml   Net 1753.33 ml       Dietary Orders (From admission, onward)       Start     Ordered    11/01/23 2023  Enteral feeding with diet  Continuous        Comments: Place additional diet order to ensure patient receives meal tray.    Question Answer Comment   Tube feeding formula: Isosource 1.5    Tube feeding bolus (mL): 2 cans   Tube feeding bolus frequency: TID    Free water restriction: No free water        11/01/23 2023                     Estimated Needs:   Total Energy Estimated Needs (kCal): 1850 kCal (1719-1983kcal)   Method for Estimating Needs: mifflin (AF 1.3-1.5)  Total Protein Estimated Needs (g): 57 g (57-72)Total Protein Estimated Needs (g/kg): 1.2 g/kg (1.2-1.5g/kg IBW)  Method for Estimating Needs: g/kg IBW  Total Fluid Estimated Needs (mL): 1850 mL (or per MD)   Method for Estimating Needs: 1ml/kcal or per MD    Nutrition Diagnosis   Nutrition Diagnosis:  Malnutrition Diagnosis  Patient has Malnutrition Diagnosis: No    Nutrition Diagnosis  Patient has Nutrition Diagnosis: Yes  Diagnosis Status (1): New  Nutrition Diagnosis 1: Increased nutrient needs  Related to (1): increased metabolic demand  As Evidenced by (1): osteonecrosis of jaw       Nutrition Interventions/Recommendations   Nutrition Interventions and  "Recommendations:  Nutrient needs increased d/t acute condition however her home regimen is still exceeding her current estimated energy needs and a decrease in feeds is recommended by RD.     Discussed pt's TF regimen with her explaining that she has been taking more TF than she needs which is the reason for significant weight gain. Discussed potentially decreasing her regimen. She declined, stating \"not if I am hungry!\"        Nutrition Prescription:   Continue pt's home TF regimen per her preference   2 cans (480ml) Isosource 1.5 TID (6 cans/day)   Free water flush per MD/team        Nutrition Prescription Goals:   Food and/or Nutrient Delivery Interventions  Interventions: Enteral intake  Enteral Intake: Other (Comment) (continue home TF regimen)  Goal: tolerate TF/ meet 100% energy needs    Nutrition Monitoring and Evaluation   Monitoring/Evaluation:   Food/Nutrient Related History Monitoring  Monitoring and Evaluation Plan: Enteral and parenteral nutrition intake  Enteral and Parenteral Nutrition Intake: Enteral nutrition intake  Criteria: tolerate TF/ meet 100% estimated energy needs      Biochemical Data, Medical Tests and Procedures  Monitoring and Evaluation Plan: Electrolyte/renal panel, Glucose/endocrine profile  Electrolyte and Renal Panel: Magnesium, Phosphorus, Potassium  Criteria: labs WNL  Glucose/Endocrine Profile: Glucose, casual  Criteria: WNL    Time Spent/Follow-up Reminder:   Follow Up  Time Spent (min): 60 minutes  Last Date of Nutrition Visit: 11/02/23  Nutrition Follow-Up Needed?: Dietitian to reassess per policy  "

## 2023-11-02 NOTE — H&P
History Of Present Illness  Pamella Rose is a 53 y.o. female with history of right maxillary W0vP0P9 SCC s/p right maxillectomy, right SND (Ib-IV) left SND (I-III), dobhoff, recon with L scapula c/b flap failure s/p revision R scapula reconstruction now presenting with osteoradionecrosis and wound drainage. She has been getting HBO outpatient and taking pentoxifylline with no improvement. She reports neck drainage which started several days ago. She is tolerating liquids by mouth, gets nutrition predominantly via PEG (isosource).  Denies fevers, chills.      Past Medical History  She has no past medical history on file.     Surgical History  She has a past surgical history that includes  section, classic (2016); IR intervention venous sampling (2021); and IR intervention venous sampling (2021).     Social History  She reports that she has been smoking cigarettes. She has been smoking an average of .25 packs per day. She has never been exposed to tobacco smoke. She has never used smokeless tobacco. She reports that she does not drink alcohol and does not use drugs.     Family History  Family History               Family History   Problem Relation Name Age of Onset    Other (CABG) Mother        Colon cancer Other grandfather              Allergies  Patient has no known allergies.     Review of Systems     Physical Exam     Last Recorded Vitals  There were no vitals taken for this visit.     Physical exam  .Physical Exam:  CONSTITUTIONAL:  No acute distress  VOICE:  No hoarseness or other abnormality  RESPIRATION:  Breathing comfortably, no stridor  CV:  No clubbing/cyanosis/edema in hands  EYES:  EOM intact, sclera normal, mild ectropion  NEURO:  Alert and oriented times 3, Cranial nerves II-XII grossly intact and symmetric bilaterally  HEAD AND FACE:  Facial swelling stable, significant radiation pigmentation and thickening to facial skin, multiple open draining wounds, +purulence  SALIVARY  GLANDS:  Parotid and submandibular glands normal bilaterally  EARS:  Normal external ears, external auditory canals, and TMs to otoscopy, normal hearing to whispered voice.  NOSE:  External nose midline, anterior rhinoscopy is normal with limited visualization to the anterior aspect of the interior turbinates, no bleeding or drainage, no lesions  ORAL CAVITY/OROPHARYNX/LIPS:  Normal mucous membranes, normal floor of mouth/tongue/OP, no masses or lesions, no exposed bone  PHARYNGEAL WALLS:  No masses or lesions  NECK/LYMPH:  No LAD, no thyroid masses, trachea midline  SKIN:  Neck skin is without scar or injury  PSYCH:  Alert and oriented with appropriate mood and affect              Assessment/Plan   Pamella Rose is a 53 y.o. female with history of right maxillary N6wI5G8 SCC s/p right maxillectomy, right SND (Ib-IV) left SND (I-III), dobhoff, recon with L scapula c/b flap failure s/p revision R scapula reconstruction now presenting with concern for osteoradionecrosis and wound drainage.      Principal Problem:    Osteoradionecrosis of jaw        - admit to ENT  - NPO at midnight  - follow up CBC, RFP  - CT neck with contrast  - ID consult  - supportive onc consult  - unaysn IV for abx  - follow up wound culture.     ENT [id number]   Nereida Tapia

## 2023-11-02 NOTE — PROGRESS NOTES
"Pamella Rose is a 53 y.o. female on day 1 of admission presenting with Osteoradionecrosis of jaw.     Subjective   Patient did well overnight, currently resting in room       Objective   Physical Exam  CONSTITUTIONAL:  No acute distress  VOICE:  No hoarseness or other abnormality  RESPIRATION:  Breathing comfortably, no stridor  CV:  No clubbing/cyanosis/edema in hands  EYES:  EOM intact, sclera normal, mild ectropion  NEURO:  Alert and oriented times 3, Cranial nerves II-XII grossly intact and symmetric bilaterally  HEAD AND FACE:  Facial swelling stable, significant radiation pigmentation and thickening to facial skin, multiple open draining wounds, +purulence  SALIVARY GLANDS:  Parotid and submandibular glands normal bilaterally  EARS:  Normal external ears, external auditory canals, and TMs to otoscopy, normal hearing to whispered voice.  NOSE:  External nose midline, anterior rhinoscopy is normal with limited visualization to the anterior aspect of the interior turbinates, no bleeding or drainage, no lesions  ORAL CAVITY/OROPHARYNX/LIPS:  Normal mucous membranes, normal floor of mouth/tongue/OP, no masses or lesions, no exposed bone  PHARYNGEAL WALLS:  No masses or lesions  NECK/LYMPH:  No LAD, no thyroid masses, trachea midline  SKIN:  Neck skin is without scar or injury  PSYCH:  Alert and oriented with appropriate mood and affect    Last Recorded Vitals  Blood pressure (!) 154/94, pulse 66, temperature 36.2 °C (97.2 °F), temperature source Temporal, resp. rate 18, height 1.549 m (5' 1\"), weight 78.6 kg (173 lb 4.5 oz), SpO2 94 %.  Intake/Output last 3 Shifts:  I/O last 3 completed shifts:  In: 1545.3 (19.7 mL/kg) [P.O.:240; I.V.:805.3 (10.2 mL/kg); NG/GT:500]  Out: 0 (0 mL/kg)   Weight: 78.6 kg     Relevant Results  Results for orders placed or performed during the hospital encounter of 11/01/23 (from the past 24 hour(s))   Renal Function Panel   Result Value Ref Range    Glucose 114 (H) 74 - 99 mg/dL    " Sodium 137 136 - 145 mmol/L    Potassium 5.2 3.5 - 5.3 mmol/L    Chloride 102 98 - 107 mmol/L    Bicarbonate 25 21 - 32 mmol/L    Anion Gap 15 10 - 20 mmol/L    Urea Nitrogen 26 (H) 6 - 23 mg/dL    Creatinine 1.18 (H) 0.50 - 1.05 mg/dL    eGFR 55 (L) >60 mL/min/1.73m*2    Calcium 10.3 8.6 - 10.6 mg/dL    Phosphorus 3.0 2.5 - 4.9 mg/dL    Albumin 4.0 3.4 - 5.0 g/dL   CBC   Result Value Ref Range    WBC 4.4 4.4 - 11.3 x10*3/uL    nRBC 0.0 0.0 - 0.0 /100 WBCs    RBC 4.30 4.00 - 5.20 x10*6/uL    Hemoglobin 12.1 12.0 - 16.0 g/dL    Hematocrit 38.1 36.0 - 46.0 %    MCV 89 80 - 100 fL    MCH 28.1 26.0 - 34.0 pg    MCHC 31.8 (L) 32.0 - 36.0 g/dL    RDW 16.8 (H) 11.5 - 14.5 %    Platelets 212 150 - 450 x10*3/uL   Magnesium   Result Value Ref Range    Magnesium 2.27 1.60 - 2.40 mg/dL   POCT GLUCOSE   Result Value Ref Range    POCT Glucose 136 (H) 74 - 99 mg/dL   POCT GLUCOSE   Result Value Ref Range    POCT Glucose 111 (H) 74 - 99 mg/dL   CBC   Result Value Ref Range    WBC 3.3 (L) 4.4 - 11.3 x10*3/uL    nRBC 0.0 0.0 - 0.0 /100 WBCs    RBC 4.24 4.00 - 5.20 x10*6/uL    Hemoglobin 11.8 (L) 12.0 - 16.0 g/dL    Hematocrit 39.6 36.0 - 46.0 %    MCV 93 80 - 100 fL    MCH 27.8 26.0 - 34.0 pg    MCHC 29.8 (L) 32.0 - 36.0 g/dL    RDW 16.9 (H) 11.5 - 14.5 %    Platelets 182 150 - 450 x10*3/uL      Scheduled medications  acetaminophen, 975 mg, oral, q8h CANDE   Or  acetaminophen, 1,000 mg, oral, q8h CANDE   Or  acetaminophen, 1,000 mg, g-tube, q8h CANDE  amLODIPine, 10 mg, oral, Daily  ampicillin-sulbactam, 3 g, intravenous, q6h  chlorhexidine, 15 mL, Swish & Spit, TID  FLUoxetine, 40 mg, oral, Daily  heparin (porcine), 5,000 Units, subcutaneous, q8h  hydroCHLOROthiazide, 25 mg, oral, Daily  levothyroxine, 100 mcg, oral, q AM  lisinopril, 40 mg, oral, Daily  metoprolol tartrate, 25 mg, oral, BID  OLANZapine, 5 mg, oral, Nightly  pantoprazole, 40 mg, oral, Daily before breakfast  pentoxifylline, 400 mg, oral, TID with meals  polyethylene  glycol, 17 g, oral, Daily  sennosides-docusate sodium, 2 tablet, oral, BID  spironolactone, 25 mg, oral, Daily    Continuous medications  lactated Ringer's, 80 mL/hr, Last Rate: 80 mL/hr (11/02/23 6645)    PRN medications  PRN medications: naloxone, ondansetron **OR** ondansetron, SUMAtriptan     Assessment/Plan   Principal Problem:    Osteoradionecrosis of jaw    Pamella Rose is a 53 y.o. female with history of right maxillary T0xN5O9 SCC s/p right maxillectomy, right SND (Ib-IV) left SND (I-III), dobhoff, recon with L scapula c/b flap failure s/p revision R scapula reconstruction now presenting with concern for osteoradionecrosis and wound drainage.     Active Issues:  ORN  Chronic Osteomyelitis  Facial Cellulitis/Abscess  Class II Obesity  CKD Stage II    Plan:  -Analgesia:  Dilaudid PCA, Scheduled Acetaminophen  -FEN: mIVFs,bolus Tube feeds, consult nutrition; monitor and replete electrolytes as needed  -Neuro: restart home Prozac , Zyprexa  -Pulm: wean oxygen to room air,   -ID: Unasyn 3g Q6hrs, follow up cultures, Consult Infectious Disease  -Cardiac: Vitals Q4hr; restart home Amplodipine, hydrochlorothiazide, Lisinopril and Aldactone  -Endo: Levothyroxine restarted  -GI: Bowel regimen, PPI,  and PRN anti-emetic  -: voiding  -Steroids/Special:   -Embolic PPx: SQH, SCDs while in bed  -Dispo: Discharge planning pending cultures and ID recs home with home care vs skilled nursing facility    All plans discussed with attendings after morning rounds.         I spent 20 minutes in the professional and overall care of this patient.      Eva Zee APRN, CNP  Department of Otolaryngology: Head & Neck Surgery  Personal Pager [phone]  ENT Team  Head and Neck Phone: [phone]

## 2023-11-02 NOTE — CONSULTS
Inpatient consult to Infectious Diseases  Consult performed by: Bess Polk DO  Consult ordered by: Eva Zee, APRN-CNP          Primary MD: Mayte Vela, APRN-CNP, DNP    Reason For Consult: Osteoradionecrosis     History Of Present Illness  Pamella Rose is a 53 y.o. pmhx sig for R maxillary SCC dx 2022, s/p R maxillectomy 2022, LN dissection, reconstruction w/ L scapula c/b flap failure and revision w/ R scapula, followed by chemoradiation therapy completed 2022, FNA 2023 w/o carcinoma; admitted  with concern for ORN and wound drainage. Per notes had CT scan w/ concern for ORN in , has been getting hyperbaric oxygen w/o improvement. Started on amox-clav in Sept and seems to have continued this until inpatient, although she's not sure if she is taking this right now. She states she has had sig pain and swelling in her face which has improved since PO abx and admission. No F/C at home, no N/V, abd pain. No sinus pressure. Does feel that she gets reflux of food into her sinuses when eating. Has rhonchorous breath sounds on exam but denies cough, resp complaints, or SOB. Smokes 1-2 cig every few days, states she has cut back from heavier smoking for the past year.     On admission pt afebrile, HDS, WBC 4k. Started on amp-sulbactam.      Tissue cx in process from  PM.     Past Medical History  She has no past medical history on file.    Surgical History  She has a past surgical history that includes  section, classic (2016); IR intervention venous sampling (2021); and IR intervention venous sampling (2021).     Social History     Occupational History    Not on file   Tobacco Use    Smoking status: Every Day     Packs/day: .25     Types: Cigarettes     Passive exposure: Never    Smokeless tobacco: Never   Substance and Sexual Activity    Alcohol use: Never    Drug use: Never    Sexual activity: Not on file     Travel History   Travel since 10/02/23     No documented travel since 10/02/23            Pets: unknown  Hobbies: unknown    Family History  Family History   Problem Relation Name Age of Onset    Other (CABG) Mother      Colon cancer Other grandfather      Allergies  Patient has no known allergies.     Immunization History   Administered Date(s) Administered    Influenza, injectable, MDCK, preservative free, quadrivalent 11/11/2020    Influenza, seasonal, injectable 10/29/2015, 09/20/2019, 11/12/2021, 09/28/2022    Pfizer Gray Cap SARS-CoV-2 03/02/2022    Pfizer Purple Cap SARS-CoV-2 08/26/2021, 09/16/2021    Pneumococcal polysaccharide vaccine, 23-valent, age 2 years and older (PNEUMOVAX 23) 09/28/2022    Zoster vaccine, recombinant, adult (SHINGRIX) 11/11/2020     Medications  Home medications:  Medications Prior to Admission   Medication Sig Dispense Refill Last Dose    acetaminophen (Children's Acetaminophen) 160 mg/5 mL liquid 30 mL 3 TIMES DAILY (route: oral)       amLODIPine (Norvasc) 10 mg tablet 1 tablet EVERY DAY  (route: oral)       amoxicillin-pot clavulanate (Augmentin) 875-125 mg tablet TAKE 1 TABLET BY MOUTH TWO TIMES A DAY 14 tablet 0     aspirin 325 mg tablet Take 1 tablet (325 mg) by mouth.       chlorhexidine (Peridex) 0.12 % solution RINSE MOUTH WITH 15ML (1 CAPFUL) FOR 30 SECONDS 3 times daily.       chlorhexidine (Peridex) 0.12 % solution RINSE WITH 15 ML FOUR TIMES A DAY AFTER MEALS AND AT BEDTIME 473 mL 6     dexAMETHasone (Decadron) 4 mg tablet Take by mouth.       docusate sodium (Docu) 50 mg/5 mL oral liquid 10 mL 2 TIMES DAILY (route: oral)       famotidine (Pepcid) 20 mg tablet 1 tablet DAILY (route: oral)       fentaNYL (Duragesic) 12 mcg/hr patch fentaNYL 12 MCG/HR Transdermal Patch 72 Hour       fentaNYL (Duragesic) 25 mcg/hr patch fentaNYL 25 MCG/HR Transdermal Patch 72 Hour Quantity: 5 Refills: 0 Start : 23-May-2022 Active       FLUoxetine (PROzac) 20 mg tablet Take 2 tablets (40 mg) by mouth once daily.       food supplemt,  lactose-reduced (Boost High Protein) 0.06 gram- 1 kcal/mL liquid Take 1 Can by mouth.       gabapentin 250 mg/5 mL solution 6 mL 3 TIMES DAILY (route: oral)       hydrALAZINE (Apresoline) 50 mg tablet Take by mouth.       hydroCHLOROthiazide (HYDRODiuril) 25 mg tablet Take 1 tablet (25 mg) by mouth once daily.       hydrocortisone (Anusol-HC) 25 mg suppository Insert into the rectum twice a day.       hydrocortisone 2.5 % cream Hydrocortisone 2.5 % External Cream   Quantity: 30  Refills: 0        Start : 25-May-2022   Active       levothyroxine (LevoxyL) 100 mcg tablet Take 1 tablet (100 mcg) by mouth once daily in the morning. Take before meals. Must take on an empty stomach, 30min to 1 hour before breakfast or other meds. 30 tablet 11     lidocaine (Lidoderm) 5 % patch Place 1 patch on the skin once daily.       lisinopril 40 mg tablet Take 1 tablet (40 mg) by mouth once daily.       metoprolol tartrate (Lopressor) 25 mg tablet Per instructions 2 TIMES DAILY (route: oral)       naloxone (Narcan) 4 mg/0.1 mL nasal spray Administer 1 spray (4 mg) into affected nostril(s) once daily as needed.       nicotine (Nicoderm CQ) 14 mg/24 hr patch        nicotine (Nicoderm CQ) 7 mg/24 hr patch PLACE 1 PATCH ONTO THE SKIN ONCE DAILY (EVERY 24 HOURS)       nicotine polacrilex (Commit) 2 mg lozenge PLACE 2 MG INSIDE CHEEK AS NEEDED (USE ONE LOZENGE EVERY 1-2 HOURS AS NEEDED FOR CRAVINGS)       nitroglycerin (Nitrostat) 0.4 mg SL tablet PLACE 1 TABLET UNDER THE TONGUE EVERY 5 MINUTES FOR UP TO 3 DOSES AS NEEDED FOR CHEST PAIN.CALL 911 IF PAIN PERSISTS.       nystatin (Mycostatin) 100,000 unit/mL suspension Swish and spit 5mL three times a day. Swish for 1 minute       OLANZapine (ZyPREXA) 5 mg tablet Take 1 tablet (5 mg) by mouth once daily at bedtime.       OLANZapine (ZyPREXA) 5 mg tablet TAKE 1 TABLET BY MOUTH ONCE DAILY AT BEDTIME 90 tablet 3     omeprazole (PriLOSEC) 20 mg DR capsule Per instructions EVERY DAY  (route:  oral)       ondansetron (Zofran) 8 mg tablet Take by mouth.       oxyCODONE (Roxicodone) 20 mg/mL concentrated solution        oxyCODONE (Roxicodone) 5 mg/5 mL solution 15 mL 3 TIMES DAILY (route: oral)       pentoxifylline (Trental) 400 mg ER tablet Take 1 tablet (400 mg) by mouth 3 times a day with meals.       pentoxifylline (Trental) 400 mg ER tablet TAKE 1 TABLET 3 TIMES DAILY. 270 tablet 0     pravastatin (Pravachol) 40 mg tablet Per instructions EVERY DAY  (route: oral)       predniSONE (Deltasone) 20 mg tablet Take by mouth.       pregabalin (LYRICA) 20 mg/mL solution Take by mouth.       prochlorperazine (Compazine) 10 mg tablet Take by mouth.       rosuvastatin (Crestor) 10 mg tablet        sennosides (senna) 8.6 mg tablet Per instructions AS DIRECTED (route: oral)       spironolactone (Aldactone) 25 mg tablet 1 tablet DAILY (route: oral)       SUMAtriptan (Imitrex) 50 mg tablet Take 1 tablet (50 mg) by mouth if needed.       terazosin (Hytrin) 5 mg capsule Take 1 capsule (5 mg) by mouth.       vitamin E 45 mg (100 unit) capsule Take 1 capsule (100 Units) by mouth once daily.       vitamin E mixed 100 unit tablet Take by mouth.       white petrolatum 41 % ointment ointment APPLY SPARINGLY TO AFFECTED AREA(S) 3 TIMES A DAY        Current medications:  Scheduled medications  acetaminophen, 975 mg, oral, q8h CANDE   Or  acetaminophen, 1,000 mg, oral, q8h CANDE   Or  acetaminophen, 1,000 mg, g-tube, q8h CANDE  amLODIPine, 10 mg, oral, Daily  ampicillin-sulbactam, 3 g, intravenous, q6h  chlorhexidine, 15 mL, Swish & Spit, TID  FLUoxetine, 40 mg, oral, Daily  heparin (porcine), 5,000 Units, subcutaneous, q8h  hydroCHLOROthiazide, 25 mg, oral, Daily  levothyroxine, 100 mcg, oral, q AM  lisinopril, 40 mg, oral, Daily  metoprolol tartrate, 25 mg, oral, BID  OLANZapine, 5 mg, oral, Nightly  pantoprazole, 40 mg, oral, Daily before breakfast  pentoxifylline, 400 mg, oral, TID with meals  polyethylene glycol, 17 g, oral,  "Daily  sennosides-docusate sodium, 2 tablet, oral, BID  spironolactone, 25 mg, oral, Daily      Continuous medications  lactated Ringer's, 80 mL/hr, Last Rate: 80 mL/hr (11/02/23 0736)      PRN medications  PRN medications: naloxone, ondansetron **OR** ondansetron, SUMAtriptan    Review of Systems     Objective  Range of Vitals (last 24 hours)  Heart Rate:  [58-79]   Temp:  [36.2 °C (97.2 °F)-36.6 °C (97.9 °F)]   Resp:  [18]   BP: (131-165)/()   Height:  [154.9 cm (5' 1\")]   Weight:  [78.6 kg (173 lb 4.5 oz)]   SpO2:  [93 %-97 %]   Daily Weight  11/01/23 : 78.6 kg (173 lb 4.5 oz)    Body mass index is 32.74 kg/m².     Physical Exam  GEN: comfortable appearing middle aged woman, sitting up in bed, NAD  HEENT: radiation changes to b/l neck; R neck w/ dry skin, serous drainage at opening in R anterior neck; opening in hard palate, ?visible bone in R mandible, no gross abscess, pt has limited opening of jaw  NECK: as above   RESP: Coarse rhonchorous breath sounds anteriorly, scant wheezes, no tachypnea or cyanosis  CV: RRR, S1/S2, no appreciable murmurs, 2+ R radial pulse  ABDOMEN: soft, nontender, BS+, nondistended  MSK: no bony deformities, no joint warmth or swelling  EXTREMITIES: warm and well-perfused, no peripheral edema  SKIN: as above   NEURO: alert, answers questions appropriately, moving b/l UE and LE to command, no gross focal deficits  PSYCH: calm and cooperative       Relevant Results  Outside Hospital Results  No  Labs  Results from last 72 hours   Lab Units 11/02/23  0726 11/01/23  1830   WBC AUTO x10*3/uL 3.3* 4.4   HEMOGLOBIN g/dL 11.8* 12.1   HEMATOCRIT % 39.6 38.1   PLATELETS AUTO x10*3/uL 182 212     Results from last 72 hours   Lab Units 11/02/23  0726 11/01/23  1830   SODIUM mmol/L 139 137   POTASSIUM mmol/L 4.2 5.2   CHLORIDE mmol/L 101 102   CO2 mmol/L 28 25   BUN mg/dL 23 26*   CREATININE mg/dL 1.07* 1.18*   GLUCOSE mg/dL 108* 114*   CALCIUM mg/dL 9.8 10.3   ANION GAP mmol/L 14 15   EGFR " "mL/min/1.73m*2 61 55*   PHOSPHORUS mg/dL 3.9 3.0     Results from last 72 hours   Lab Units 11/02/23  0726 11/01/23  1830   ALBUMIN g/dL 3.7 4.0     Estimated Creatinine Clearance: 56.4 mL/min (A) (by C-G formula based on SCr of 1.07 mg/dL (H)).  CRP   Date Value Ref Range Status   06/26/2022 19.16 (A) mg/dL Final     Comment:     REF VALUE  < 1.00       No results found for: \"HIV1X2\", \"HIVCONF\", \"KLXDGL6PR\"  No results found for: \"HEPCABINIT\", \"HEPCAB\", \"HCVPCRQUANT\"  Microbiology  Susceptibility data from last 90 days.  Collected Specimen Info Organism   10/09/23 Tissue/Biopsy from Wound/Tissue Mixed Skin Microorganisms      6/24/22 mixed skin tanya  10/9/23 rare PMNs, no org  11/1 pending       Imaging    11/1 CT neck    IMPRESSION:  1. Similar morphology of the lucency and irregularity of the anterior  mandible bilaterally, right-greater-than-left, though size is  marginally decreased. Stable adjacent soft tissue swelling and  postoperative scarring. Findings may reflect osteonecrosis and or  superimposed osteomyelitis with underlying neoplasm/pathologic  fracture not excluded.  2. Similar postoperative changes of the right hemimaxillectomy with  osteophyte cutaneous flap reconstruction and surgical mesh  reconstruction of the right orbital floor. Matted excess soft tissue  in this region may be partially related to post treatment changes.  Nuclear medicine PET CT would have added sensitivity and specificity  for component of residual recurrent mass if clinically necessary.  3. Additional findings as noted above.     Assessment/Plan     Pamella Rose is a 54 y/o woman hx oropharyngeal SCC dx Feb 2022 s/p maxillectomy and scapular free flap admitted w/ ORN on PO abx since ~Sept, with persistent drainage and pain/swelling over R mandible.     #ORNJ      Recommendations:  -Cont IV amp-sulbactam 3gm q6H for now  -Will discuss w/ surgical team re: possible surgical debridement vs long-term IV abx plan     Pt seen and " discussed w/ Dr. Estrella, will follow      Bess Polk,   Infectious Disease Fellow, PGY5  Epic Chat until 5pm/Team A pager [phone]

## 2023-11-02 NOTE — ED NOTES
Pharmacy Medication History Review    Pamella Rose is a 53 y.o. female admitted for Osteoradionecrosis of jaw. Pharmacy reviewed the patient's [id number]-hb-qmxlsgyzo medications and allergies for accuracy.    The list below reflectives the updated PTA list. Please review each medication in order reconciliation for additional clarification and justification.  Medications Prior to Admission   Medication Sig Dispense Refill Last Dose    acetaminophen (Children's Acetaminophen) 160 mg/5 mL liquid 30 mL 3 TIMES DAILY (route: oral)   Past Week    amLODIPine (Norvasc) 10 mg tablet 1 tablet EVERY DAY  (route: oral)   11/1/2023    aspirin 325 mg tablet Take 1 tablet (325 mg) by mouth.   Not taking    chlorhexidine (Peridex) 0.12 % solution RINSE WITH 15 ML FOUR TIMES A DAY AFTER MEALS AND AT BEDTIME 473 mL 6 11/1/2023    famotidine (Pepcid) 20 mg tablet 1 tablet DAILY (route: oral)   11/1/2023    FLUoxetine (PROzac) 20 mg tablet Take 1 tablet (20 mg) by mouth once daily.   11/1/2023    food supplemt, lactose-reduced (Boost High Protein) 0.06 gram- 1 kcal/mL liquid Take 1 Can by mouth once daily.   11/1/2023    gabapentin 250 mg/5 mL solution Take 5 mL (250 mg) by mouth once daily.   11/1/2023    hydrALAZINE (Apresoline) 50 mg tablet Take 1 tablet (50 mg) by mouth 3 times a day.   not taking    hydroCHLOROthiazide (HYDRODiuril) 25 mg tablet Take 1 tablet (25 mg) by mouth once daily.   11/1/2023    levothyroxine (LevoxyL) 100 mcg tablet Take 1 tablet (100 mcg) by mouth once daily in the morning. Take before meals. Must take on an empty stomach, 30min to 1 hour before breakfast or other meds. 30 tablet 11 11/1/2023    lisinopril 40 mg tablet Take 1 tablet (40 mg) by mouth once daily.   11/2/2023    metoprolol succinate XL (Toprol-XL) 50 mg 24 hr tablet Take 1 tablet (50 mg) by mouth once daily. Do not crush or chew.   11/1/2023    naloxone (Narcan) 4 mg/0.1 mL nasal spray Administer 1 spray (4 mg) into affected nostril(s) once  daily as needed.   not taking    nitroglycerin (Nitrostat) 0.4 mg SL tablet PLACE 1 TABLET UNDER THE TONGUE EVERY 5 MINUTES FOR UP TO 3 DOSES AS NEEDED FOR CHEST PAIN.CALL 911 IF PAIN PERSISTS.   not taking    OLANZapine (ZyPREXA) 5 mg tablet TAKE 1 TABLET BY MOUTH ONCE DAILY AT BEDTIME 90 tablet 3 11/1/2023    omeprazole (PriLOSEC) 20 mg DR capsule Take 2 capsules (40 mg) by mouth once daily.   not taking    oxyCODONE (Roxicodone) 5 mg/5 mL solution 15 mL 3 TIMES DAILY (route: oral)   not taking    pentoxifylline (Trental) 400 mg ER tablet TAKE 1 TABLET 3 TIMES DAILY. 270 tablet 0 not taking    rosuvastatin (Crestor) 10 mg tablet Take 1 tablet (10 mg) by mouth once daily.   11/1/2023    spironolactone (Aldactone) 25 mg tablet 1 tablet DAILY (route: oral)   11/1/2023    SUMAtriptan (Imitrex) 50 mg tablet Take 1 tablet (50 mg) by mouth if needed for migraine.   11/1/2023    terazosin (Hytrin) 5 mg capsule Take 1 capsule (5 mg) by mouth.   11/1/2023    white petrolatum 41 % ointment ointment APPLY SPARINGLY TO AFFECTED AREA(S) 3 TIMES A DAY   not taking          The list below reflectives the updated allergy list. Please review each documented allergy for additional clarification and justification.  Allergies  Reviewed by Dada Vazquez PharmD on 11/2/2023   No Known Allergies       Medication reconciliation was reviewed with out patient fill history, Reji Camarillo OARRS, patient interview. Patient is an okay historian, she was able to recall her medications when prompted but did not recall the dosing.     Below are additional concerns with the patient's PTA list.  - Patient reported using chlorhexidine rinse mouth four times a day (after meals and at bedtime)  - Patient reported taking fluoxetine 20 mg - 1 tablet by mouth once daily, instead of 2 tablets for 40 mg  - Patient reported no longer taking pentoxifylline (Trental), consistent with fill history, last fill 6/2023 x 30 day supply  - Patient is  currently taking metoprolol succinate 50 mg by mouth daily; no longer taking metoprolol tartrate  - Patient reported taking hydrochlorothiazide 25 mg, sumatriptan 50 mg, terazosin 5mg. However, there has been no dispense history for hydrochlorothiazide, sumatriptan, terazosin the past year    Bere Vazquez, PharmD   Meds PGY1 Pharmacy Resident   [facility]  Please reach out via [employer] Secure Chat for questions, or if no response call [phone] or [place] “Med[facility]”

## 2023-11-03 LAB
ALBUMIN SERPL BCP-MCNC: 4.2 G/DL (ref 3.4–5)
ANION GAP SERPL CALC-SCNC: 15 MMOL/L (ref 10–20)
BUN SERPL-MCNC: 30 MG/DL (ref 6–23)
CALCIUM SERPL-MCNC: 10.1 MG/DL (ref 8.6–10.6)
CHLORIDE SERPL-SCNC: 98 MMOL/L (ref 98–107)
CO2 SERPL-SCNC: 28 MMOL/L (ref 21–32)
CREAT SERPL-MCNC: 1.51 MG/DL (ref 0.5–1.05)
ERYTHROCYTE [DISTWIDTH] IN BLOOD BY AUTOMATED COUNT: 17.1 % (ref 11.5–14.5)
GFR SERPL CREATININE-BSD FRML MDRD: 41 ML/MIN/1.73M*2
GLUCOSE SERPL-MCNC: 194 MG/DL (ref 74–99)
HCT VFR BLD AUTO: 39.1 % (ref 36–46)
HGB BLD-MCNC: 12.7 G/DL (ref 12–16)
MAGNESIUM SERPL-MCNC: 2.31 MG/DL (ref 1.6–2.4)
MCH RBC QN AUTO: 28.7 PG (ref 26–34)
MCHC RBC AUTO-ENTMCNC: 32.5 G/DL (ref 32–36)
MCV RBC AUTO: 89 FL (ref 80–100)
NRBC BLD-RTO: 0 /100 WBCS (ref 0–0)
PHOSPHATE SERPL-MCNC: 3.9 MG/DL (ref 2.5–4.9)
PLATELET # BLD AUTO: 191 X10*3/UL (ref 150–450)
POTASSIUM SERPL-SCNC: 4 MMOL/L (ref 3.5–5.3)
RBC # BLD AUTO: 4.42 X10*6/UL (ref 4–5.2)
SODIUM SERPL-SCNC: 137 MMOL/L (ref 136–145)
WBC # BLD AUTO: 4 X10*3/UL (ref 4.4–11.3)

## 2023-11-03 PROCEDURE — 96372 THER/PROPH/DIAG INJ SC/IM: CPT | Performed by: STUDENT IN AN ORGANIZED HEALTH CARE EDUCATION/TRAINING PROGRAM

## 2023-11-03 PROCEDURE — 2500000004 HC RX 250 GENERAL PHARMACY W/ HCPCS (ALT 636 FOR OP/ED): Performed by: STUDENT IN AN ORGANIZED HEALTH CARE EDUCATION/TRAINING PROGRAM

## 2023-11-03 PROCEDURE — 83735 ASSAY OF MAGNESIUM: CPT | Performed by: STUDENT IN AN ORGANIZED HEALTH CARE EDUCATION/TRAINING PROGRAM

## 2023-11-03 PROCEDURE — 80069 RENAL FUNCTION PANEL: CPT | Performed by: STUDENT IN AN ORGANIZED HEALTH CARE EDUCATION/TRAINING PROGRAM

## 2023-11-03 PROCEDURE — 85027 COMPLETE CBC AUTOMATED: CPT | Performed by: STUDENT IN AN ORGANIZED HEALTH CARE EDUCATION/TRAINING PROGRAM

## 2023-11-03 PROCEDURE — 1170000001 HC PRIVATE ONCOLOGY ROOM DAILY

## 2023-11-03 PROCEDURE — 2500000001 HC RX 250 WO HCPCS SELF ADMINISTERED DRUGS (ALT 637 FOR MEDICARE OP): Performed by: STUDENT IN AN ORGANIZED HEALTH CARE EDUCATION/TRAINING PROGRAM

## 2023-11-03 PROCEDURE — 99232 SBSQ HOSP IP/OBS MODERATE 35: CPT | Performed by: NURSE PRACTITIONER

## 2023-11-03 PROCEDURE — 36415 COLL VENOUS BLD VENIPUNCTURE: CPT | Performed by: STUDENT IN AN ORGANIZED HEALTH CARE EDUCATION/TRAINING PROGRAM

## 2023-11-03 RX ADMIN — ACETAMINOPHEN 975 MG: 325 TABLET ORAL at 21:28

## 2023-11-03 RX ADMIN — AMPICILLIN SODIUM AND SULBACTAM SODIUM 3 G: 2; 1 INJECTION, POWDER, FOR SOLUTION INTRAMUSCULAR; INTRAVENOUS at 19:16

## 2023-11-03 RX ADMIN — AMPICILLIN SODIUM AND SULBACTAM SODIUM 3 G: 2; 1 INJECTION, POWDER, FOR SOLUTION INTRAMUSCULAR; INTRAVENOUS at 13:25

## 2023-11-03 RX ADMIN — FLUOXETINE 20 MG: 20 CAPSULE ORAL at 09:36

## 2023-11-03 RX ADMIN — HEPARIN SODIUM 5000 UNITS: 5000 INJECTION INTRAVENOUS; SUBCUTANEOUS at 19:17

## 2023-11-03 RX ADMIN — AMLODIPINE BESYLATE 10 MG: 10 TABLET ORAL at 09:36

## 2023-11-03 RX ADMIN — CHLORHEXIDINE GLUCONATE 0.12% ORAL RINSE 15 ML: 1.2 LIQUID ORAL at 06:19

## 2023-11-03 RX ADMIN — SODIUM CHLORIDE, POTASSIUM CHLORIDE, SODIUM LACTATE AND CALCIUM CHLORIDE 500 ML: 600; 310; 30; 20 INJECTION, SOLUTION INTRAVENOUS at 19:17

## 2023-11-03 RX ADMIN — TERAZOSIN HYDROCHLORIDE 5 MG: 1 CAPSULE ORAL at 21:28

## 2023-11-03 RX ADMIN — OLANZAPINE 5 MG: 5 TABLET, FILM COATED ORAL at 21:28

## 2023-11-03 RX ADMIN — SPIRONOLACTONE 25 MG: 25 TABLET ORAL at 09:36

## 2023-11-03 RX ADMIN — HEPARIN SODIUM 5000 UNITS: 5000 INJECTION INTRAVENOUS; SUBCUTANEOUS at 11:28

## 2023-11-03 RX ADMIN — LISINOPRIL 40 MG: 40 TABLET ORAL at 13:25

## 2023-11-03 RX ADMIN — HEPARIN SODIUM 5000 UNITS: 5000 INJECTION INTRAVENOUS; SUBCUTANEOUS at 03:19

## 2023-11-03 RX ADMIN — HYDROCHLOROTHIAZIDE 25 MG: 25 TABLET ORAL at 13:24

## 2023-11-03 RX ADMIN — LEVOTHYROXINE SODIUM 100 MCG: 100 TABLET ORAL at 09:36

## 2023-11-03 RX ADMIN — METOPROLOL SUCCINATE 50 MG: 50 TABLET, EXTENDED RELEASE ORAL at 09:36

## 2023-11-03 RX ADMIN — PENTOXIFYLLINE 400 MG: 400 TABLET, EXTENDED RELEASE ORAL at 19:16

## 2023-11-03 RX ADMIN — CHLORHEXIDINE GLUCONATE 0.12% ORAL RINSE 15 ML: 1.2 LIQUID ORAL at 11:28

## 2023-11-03 RX ADMIN — AMPICILLIN SODIUM AND SULBACTAM SODIUM 3 G: 2; 1 INJECTION, POWDER, FOR SOLUTION INTRAMUSCULAR; INTRAVENOUS at 00:29

## 2023-11-03 RX ADMIN — PENTOXIFYLLINE 400 MG: 400 TABLET, EXTENDED RELEASE ORAL at 14:36

## 2023-11-03 RX ADMIN — PENTOXIFYLLINE 400 MG: 400 TABLET, EXTENDED RELEASE ORAL at 09:37

## 2023-11-03 RX ADMIN — AMPICILLIN SODIUM AND SULBACTAM SODIUM 3 G: 2; 1 INJECTION, POWDER, FOR SOLUTION INTRAMUSCULAR; INTRAVENOUS at 06:19

## 2023-11-03 RX ADMIN — ACETAMINOPHEN 975 MG: 325 TABLET ORAL at 06:19

## 2023-11-03 RX ADMIN — CHLORHEXIDINE GLUCONATE 0.12% ORAL RINSE 15 ML: 1.2 LIQUID ORAL at 17:13

## 2023-11-03 RX ADMIN — PANTOPRAZOLE SODIUM 40 MG: 40 TABLET, DELAYED RELEASE ORAL at 06:19

## 2023-11-03 RX ADMIN — CHLORHEXIDINE GLUCONATE 0.12% ORAL RINSE 15 ML: 1.2 LIQUID ORAL at 21:28

## 2023-11-03 ASSESSMENT — PAIN - FUNCTIONAL ASSESSMENT: PAIN_FUNCTIONAL_ASSESSMENT: 0-10

## 2023-11-03 ASSESSMENT — PAIN SCALES - GENERAL
PAINLEVEL_OUTOF10: 0 - NO PAIN
PAINLEVEL_OUTOF10: 0 - NO PAIN
PAINLEVEL_OUTOF10: 4
PAINLEVEL_OUTOF10: 0 - NO PAIN

## 2023-11-03 NOTE — CARE PLAN
Problem: Pain  Goal: My pain/discomfort is manageable  Outcome: Progressing     Problem: Safety  Goal: Patient will be injury free during hospitalization  Outcome: Progressing  Goal: I will remain free of falls  Outcome: Progressing     Problem: Daily Care  Goal: Daily care needs are met  Outcome: Progressing     Problem: Psychosocial Needs  Goal: Demonstrates ability to cope with hospitalization/illness  Outcome: Progressing  Goal: Collaborate with me, my family, and caregiver to identify my specific goals  Outcome: Progressing     Problem: Discharge Barriers  Goal: My discharge needs are met  Outcome: Progressing   The patient's goals for the shift include      The clinical goals for the shift include patient will remain HDS with VS WNL by 1800 on 11/3/2023.    Over the shift, the patient did not make progress toward the following goals. Recommendations to address these barriers include continue with plan of care.    Patient was safe and injury free throughout shift. Patient ambulated out of bed twice this shift. VS WNL this shift.

## 2023-11-03 NOTE — SIGNIFICANT EVENT
OTOLARYNGOLOGY - HEAD AND NECK SURGERY ROUNDING NOTE    Subjective:  Doing well. ID following and awaiting cx. No changes from morning rounds.    Objective:  Visit Vitals  /84 (BP Location: Left arm, Patient Position: Sitting)   Pulse 65   Temp 36.5 °C (97.7 °F) (Temporal)   Resp 18     General: Alert, oriented, no acute distress  Resp: Breathing comfortably on room air, no stridor  Head: Atraumatic, normocephalic  Oral Cavity: Normal mucous membranes, normal floor of mouth/tongue/OP, no masses or lesions, no exposed bone   Ears: external ears normal  Nose: external nose normal  Neck: Incision right neck wound with packing in place      Assessment/Plan:  53 y.o. female who underwent right maxillectomy, b/l SND, recon with L scap with subsequent revision by Dr. Delgado in past, now admitted for mgmt osteoradionecrosis.     - Continue current management    Viet Mendoza DO, PGY3  ENT [id number]

## 2023-11-03 NOTE — HOSPITAL COURSE
53yoF with PMH h/o CKD baseline Cr 1.21 and right maxillary SCC s/p maxillectomy with L scap recon requiring revision recon with right scap, presented with c/f osteoradionecrosis with draining wound of right neck admitted for IV abx and further workup. BID wound packing initiated. Wound culture revealed strep anginosus, ID consulted and recommended 6 weeks of Augmentin. Dressing change education was reviewed. Patient developed ROE on CKD, so CTA for surgical planning was deferred. On the day of discharge, the pt was tolerating tube feeds and they were ambulating and voiding spontaneously. They were discharged home in stable condition with instructions to follow up as outpatient.

## 2023-11-03 NOTE — PROGRESS NOTES
"Pamella Rose is a 53 y.o. female on day 2 of admission presenting with Osteoradionecrosis of jaw.     Subjective   Patient doing well. No complaints of pain. ID continues to follow awaiting cultures.    Objective   Physical Exam  Physical Exam  CONSTITUTIONAL:  No acute distress  VOICE:  No hoarseness or other abnormality  RESPIRATION:  Breathing comfortably, no stridor  CV:  No clubbing/cyanosis/edema in hands  EYES:  EOM intact, sclera normal, mild ectropion  NEURO:  Alert and oriented times 3, Cranial nerves II-XII grossly intact and symmetric bilaterally  HEAD AND FACE:  Facial swelling stable, significant radiation pigmentation and thickening to facial skin, multiple open draining wounds, +purulence  SALIVARY GLANDS:  Parotid and submandibular glands normal bilaterally  EARS:  Normal external ears, external auditory canals, and TMs to otoscopy, normal hearing to whispered voice.  NOSE:  External nose midline, anterior rhinoscopy is normal with limited visualization to the anterior aspect of the interior turbinates, no bleeding or drainage, no lesions  ORAL CAVITY/OROPHARYNX/LIPS:  Normal mucous membranes, normal floor of mouth/tongue/OP, no masses or lesions, no exposed bone  PHARYNGEAL WALLS:  No masses or lesions  NECK/LYMPH:  No LAD, no thyroid masses, trachea midline  SKIN:  Neck skin is without scar or injury  PSYCH:  Alert and oriented with appropriate mood and affect    Last Recorded Vitals  Blood pressure 135/84, pulse 65, temperature 36.5 °C (97.7 °F), temperature source Temporal, resp. rate 18, height 1.549 m (5' 0.98\"), weight 79 kg (174 lb 2.6 oz), SpO2 96 %.  Intake/Output last 3 Shifts:  I/O last 3 completed shifts:  In: 2665.3 (33.7 mL/kg) [P.O.:240; I.V.:1525.3 (19.3 mL/kg); NG/GT:500; IV Piggyback:400]  Out: 0 (0 mL/kg)   Weight: 79 kg     Relevant Results  No results found for this or any previous visit (from the past 24 hour(s)).     Scheduled medications  acetaminophen, 975 mg, oral, q8h " CANDE   Or  acetaminophen, 1,000 mg, oral, q8h CANDE   Or  acetaminophen, 1,000 mg, g-tube, q8h CANDE  amLODIPine, 10 mg, oral, Daily  ampicillin-sulbactam, 3 g, intravenous, q6h  chlorhexidine, 15 mL, Swish & Spit, Before meals & nightly  FLUoxetine, 20 mg, oral, Daily  heparin (porcine), 5,000 Units, subcutaneous, q8h  hydroCHLOROthiazide, 25 mg, oral, Daily  levothyroxine, 100 mcg, oral, q AM  lisinopril, 40 mg, oral, Daily  metoprolol succinate XL, 50 mg, oral, Daily  OLANZapine, 5 mg, oral, Nightly  pantoprazole, 40 mg, oral, Daily before breakfast  pentoxifylline, 400 mg, oral, TID with meals  polyethylene glycol, 17 g, oral, Daily  sennosides-docusate sodium, 2 tablet, oral, BID  spironolactone, 25 mg, oral, Daily  terazosin, 5 mg, oral, q PM    Continuous medications     PRN medications  PRN medications: naloxone, ondansetron **OR** ondansetron, SUMAtriptan       Assessment/Plan   Pamella Rose is a 53 y.o. female with history of right maxillary [id number] SCC s/p right maxillectomy, right SND (Ib-IV) left SND (I-III), dobhoff, recon with L scapula c/b flap failure s/p revision R scapula reconstruction now presenting with concern for osteoradionecrosis and wound drainage.      Active Issues:  ORN  Chronic Osteomyelitis  Facial Cellulitis/Abscess  Class II Obesity  CKD Stage II     Plan:  -Analgesia:  Scheduled Acetaminophen  -FEN: mIVFs,bolus Tube feeds, consult nutrition; monitor and replete electrolytes as needed  -Neuro: restart home Prozac , Zyprexa  -Pulm: wean oxygen to room air,   -ID: Unasyn 3g Q6hrs, follow up cultures, Consult Infectious Disease  -Cardiac: Vitals Q4hr; restart home Amplodipine, hydrochlorothiazide, Lisinopril and Aldactone  -Endo: Levothyroxine restarted  -GI: Bowel regimen, PPI,  and PRN anti-emetic  -: voiding  -Steroids/Special:   -Embolic PPx: SQH, SCDs while in bed  -Dispo: Discharge planning pending cultures and ID recs home with home care vs skilled nursing facility     All plans  discussed with attendings after morning rounds.       I spent 15 minutes in the professional and overall care of this patient.      Eva Zee APRN, CNP  Department of Otolaryngology: Head & Neck Surgery  Personal Pager [phone]  ENT Team  Head and Neck Phone: [phone]

## 2023-11-04 ENCOUNTER — PHARMACY VISIT (OUTPATIENT)
Dept: PHARMACY | Facility: CLINIC | Age: 53
End: 2023-11-04
Payer: MEDICAID

## 2023-11-04 LAB
ALBUMIN SERPL BCP-MCNC: 3.6 G/DL (ref 3.4–5)
ANION GAP SERPL CALC-SCNC: 15 MMOL/L (ref 10–20)
BACTERIA SPEC CULT: ABNORMAL
BUN SERPL-MCNC: 33 MG/DL (ref 6–23)
CALCIUM SERPL-MCNC: 10.1 MG/DL (ref 8.6–10.6)
CHLORIDE SERPL-SCNC: 100 MMOL/L (ref 98–107)
CO2 SERPL-SCNC: 29 MMOL/L (ref 21–32)
CREAT SERPL-MCNC: 1.47 MG/DL (ref 0.5–1.05)
ERYTHROCYTE [DISTWIDTH] IN BLOOD BY AUTOMATED COUNT: 17 % (ref 11.5–14.5)
GFR SERPL CREATININE-BSD FRML MDRD: 42 ML/MIN/1.73M*2
GLUCOSE SERPL-MCNC: 116 MG/DL (ref 74–99)
GRAM STN SPEC: ABNORMAL
GRAM STN SPEC: ABNORMAL
HCT VFR BLD AUTO: 36.9 % (ref 36–46)
HGB BLD-MCNC: 11.2 G/DL (ref 12–16)
MAGNESIUM SERPL-MCNC: 2.31 MG/DL (ref 1.6–2.4)
MCH RBC QN AUTO: 27.9 PG (ref 26–34)
MCHC RBC AUTO-ENTMCNC: 30.4 G/DL (ref 32–36)
MCV RBC AUTO: 92 FL (ref 80–100)
NRBC BLD-RTO: 0 /100 WBCS (ref 0–0)
PHOSPHATE SERPL-MCNC: 4.4 MG/DL (ref 2.5–4.9)
PLATELET # BLD AUTO: 185 X10*3/UL (ref 150–450)
POTASSIUM SERPL-SCNC: 4.4 MMOL/L (ref 3.5–5.3)
RBC # BLD AUTO: 4.01 X10*6/UL (ref 4–5.2)
SODIUM SERPL-SCNC: 140 MMOL/L (ref 136–145)
WBC # BLD AUTO: 3.9 X10*3/UL (ref 4.4–11.3)

## 2023-11-04 PROCEDURE — 85027 COMPLETE CBC AUTOMATED: CPT | Performed by: STUDENT IN AN ORGANIZED HEALTH CARE EDUCATION/TRAINING PROGRAM

## 2023-11-04 PROCEDURE — 2500000004 HC RX 250 GENERAL PHARMACY W/ HCPCS (ALT 636 FOR OP/ED): Performed by: STUDENT IN AN ORGANIZED HEALTH CARE EDUCATION/TRAINING PROGRAM

## 2023-11-04 PROCEDURE — 2500000001 HC RX 250 WO HCPCS SELF ADMINISTERED DRUGS (ALT 637 FOR MEDICARE OP): Performed by: STUDENT IN AN ORGANIZED HEALTH CARE EDUCATION/TRAINING PROGRAM

## 2023-11-04 PROCEDURE — 83735 ASSAY OF MAGNESIUM: CPT | Performed by: STUDENT IN AN ORGANIZED HEALTH CARE EDUCATION/TRAINING PROGRAM

## 2023-11-04 PROCEDURE — 96372 THER/PROPH/DIAG INJ SC/IM: CPT | Performed by: STUDENT IN AN ORGANIZED HEALTH CARE EDUCATION/TRAINING PROGRAM

## 2023-11-04 PROCEDURE — 99221 1ST HOSP IP/OBS SF/LOW 40: CPT

## 2023-11-04 PROCEDURE — 36415 COLL VENOUS BLD VENIPUNCTURE: CPT | Performed by: STUDENT IN AN ORGANIZED HEALTH CARE EDUCATION/TRAINING PROGRAM

## 2023-11-04 PROCEDURE — 80069 RENAL FUNCTION PANEL: CPT | Performed by: STUDENT IN AN ORGANIZED HEALTH CARE EDUCATION/TRAINING PROGRAM

## 2023-11-04 PROCEDURE — 1170000001 HC PRIVATE ONCOLOGY ROOM DAILY

## 2023-11-04 RX ADMIN — TERAZOSIN HYDROCHLORIDE 5 MG: 1 CAPSULE ORAL at 21:05

## 2023-11-04 RX ADMIN — ACETAMINOPHEN 975 MG: 325 TABLET ORAL at 06:17

## 2023-11-04 RX ADMIN — HEPARIN SODIUM 5000 UNITS: 5000 INJECTION INTRAVENOUS; SUBCUTANEOUS at 17:22

## 2023-11-04 RX ADMIN — LEVOTHYROXINE SODIUM 100 MCG: 100 TABLET ORAL at 09:34

## 2023-11-04 RX ADMIN — ACETAMINOPHEN 975 MG: 325 TABLET ORAL at 15:17

## 2023-11-04 RX ADMIN — AMPICILLIN SODIUM AND SULBACTAM SODIUM 3 G: 2; 1 INJECTION, POWDER, FOR SOLUTION INTRAMUSCULAR; INTRAVENOUS at 12:18

## 2023-11-04 RX ADMIN — AMPICILLIN SODIUM AND SULBACTAM SODIUM 3 G: 2; 1 INJECTION, POWDER, FOR SOLUTION INTRAMUSCULAR; INTRAVENOUS at 23:16

## 2023-11-04 RX ADMIN — METOPROLOL SUCCINATE 50 MG: 50 TABLET, EXTENDED RELEASE ORAL at 09:35

## 2023-11-04 RX ADMIN — SPIRONOLACTONE 25 MG: 25 TABLET ORAL at 09:35

## 2023-11-04 RX ADMIN — CHLORHEXIDINE GLUCONATE 0.12% ORAL RINSE 15 ML: 1.2 LIQUID ORAL at 11:00

## 2023-11-04 RX ADMIN — HEPARIN SODIUM 5000 UNITS: 5000 INJECTION INTRAVENOUS; SUBCUTANEOUS at 02:37

## 2023-11-04 RX ADMIN — HYDROCHLOROTHIAZIDE 25 MG: 25 TABLET ORAL at 09:35

## 2023-11-04 RX ADMIN — AMPICILLIN SODIUM AND SULBACTAM SODIUM 3 G: 2; 1 INJECTION, POWDER, FOR SOLUTION INTRAMUSCULAR; INTRAVENOUS at 06:17

## 2023-11-04 RX ADMIN — PENTOXIFYLLINE 400 MG: 400 TABLET, EXTENDED RELEASE ORAL at 09:35

## 2023-11-04 RX ADMIN — FLUOXETINE 20 MG: 20 CAPSULE ORAL at 09:34

## 2023-11-04 RX ADMIN — AMPICILLIN SODIUM AND SULBACTAM SODIUM 3 G: 2; 1 INJECTION, POWDER, FOR SOLUTION INTRAMUSCULAR; INTRAVENOUS at 00:50

## 2023-11-04 RX ADMIN — HEPARIN SODIUM 5000 UNITS: 5000 INJECTION INTRAVENOUS; SUBCUTANEOUS at 09:33

## 2023-11-04 RX ADMIN — AMPICILLIN SODIUM AND SULBACTAM SODIUM 3 G: 2; 1 INJECTION, POWDER, FOR SOLUTION INTRAMUSCULAR; INTRAVENOUS at 17:23

## 2023-11-04 RX ADMIN — OLANZAPINE 5 MG: 5 TABLET, FILM COATED ORAL at 21:05

## 2023-11-04 RX ADMIN — PENTOXIFYLLINE 400 MG: 400 TABLET, EXTENDED RELEASE ORAL at 12:18

## 2023-11-04 RX ADMIN — ACETAMINOPHEN 975 MG: 325 TABLET ORAL at 23:16

## 2023-11-04 RX ADMIN — LISINOPRIL 40 MG: 40 TABLET ORAL at 09:34

## 2023-11-04 RX ADMIN — AMLODIPINE BESYLATE 10 MG: 10 TABLET ORAL at 09:35

## 2023-11-04 RX ADMIN — CHLORHEXIDINE GLUCONATE 0.12% ORAL RINSE 15 ML: 1.2 LIQUID ORAL at 15:17

## 2023-11-04 RX ADMIN — CHLORHEXIDINE GLUCONATE 0.12% ORAL RINSE 15 ML: 1.2 LIQUID ORAL at 21:05

## 2023-11-04 RX ADMIN — PENTOXIFYLLINE 400 MG: 400 TABLET, EXTENDED RELEASE ORAL at 17:23

## 2023-11-04 RX ADMIN — CHLORHEXIDINE GLUCONATE 0.12% ORAL RINSE 15 ML: 1.2 LIQUID ORAL at 06:17

## 2023-11-04 RX ADMIN — PANTOPRAZOLE SODIUM 40 MG: 40 TABLET, DELAYED RELEASE ORAL at 06:17

## 2023-11-04 ASSESSMENT — COLUMBIA-SUICIDE SEVERITY RATING SCALE - C-SSRS
6. HAVE YOU EVER DONE ANYTHING, STARTED TO DO ANYTHING, OR PREPARED TO DO ANYTHING TO END YOUR LIFE?: NO
1. IN THE PAST MONTH, HAVE YOU WISHED YOU WERE DEAD OR WISHED YOU COULD GO TO SLEEP AND NOT WAKE UP?: NO
2. HAVE YOU ACTUALLY HAD ANY THOUGHTS OF KILLING YOURSELF?: NO

## 2023-11-04 ASSESSMENT — PAIN SCALES - GENERAL
PAINLEVEL_OUTOF10: 0 - NO PAIN
PAINLEVEL_OUTOF10: 0 - NO PAIN

## 2023-11-04 ASSESSMENT — COGNITIVE AND FUNCTIONAL STATUS - GENERAL
MOBILITY SCORE: 24
DAILY ACTIVITIY SCORE: 24

## 2023-11-04 ASSESSMENT — PAIN SCALES - WONG BAKER: WONGBAKER_NUMERICALRESPONSE: NO HURT

## 2023-11-04 NOTE — PROGRESS NOTES
"Pamella Rose is a 53 y.o. female on day 3 of admission presenting with Osteoradionecrosis of jaw.     Subjective   Patient doing well. No complaints of pain.  No acute events overnight.  Continuing twice daily packing.      Objective     Physical Exam  CONSTITUTIONAL:  No acute distress  VOICE:  No hoarseness or other abnormality  RESPIRATION:  Breathing comfortably, no stridor  CV:  No clubbing/cyanosis/edema in hands  EYES:  EOM intact, sclera normal, mild ectropion  NEURO:  Alert and oriented times 3, Cranial nerves II-XII grossly intact and symmetric bilaterally  HEAD AND FACE:  Facial swelling stable, significant radiation pigmentation and thickening to facial skin, multiple open draining wounds, +purulence  SALIVARY GLANDS:  Parotid and submandibular glands normal bilaterally  EARS:  Normal external ears, external auditory canals, and TMs to otoscopy, normal hearing to whispered voice.  NOSE:  External nose midline, anterior rhinoscopy is normal with limited visualization to the anterior aspect of the interior turbinates, no bleeding or drainage, no lesions  ORAL CAVITY/OROPHARYNX/LIPS:  Normal mucous membranes, normal floor of mouth/tongue/OP, no masses or lesions, no exposed bone  PHARYNGEAL WALLS:  No masses or lesions  NECK/LYMPH:  No LAD, no thyroid masses, trachea midline  SKIN:  Neck skin is without scar or injury  PSYCH:  Alert and oriented with appropriate mood and affect    Last Recorded Vitals  Blood pressure 112/69, pulse 63, temperature 36.4 °C (97.5 °F), temperature source Temporal, resp. rate 16, height 1.549 m (5' 0.98\"), weight 79.6 kg (175 lb 7.8 oz), SpO2 93 %.  Intake/Output last 3 Shifts:  I/O last 3 completed shifts:  In: 1960 (24.8 mL/kg) [P.O.:120; I.V.:720 (9.1 mL/kg); NG/GT:620; IV Piggyback:500]  Out: 1 (0 mL/kg) [Urine:1 (0 mL/kg/hr)]  Weight: 79 kg     Relevant Results  Results for orders placed or performed during the hospital encounter of 11/01/23 (from the past 24 hour(s)) "   Renal function panel   Result Value Ref Range    Glucose 194 (H) 74 - 99 mg/dL    Sodium 137 136 - 145 mmol/L    Potassium 4.0 3.5 - 5.3 mmol/L    Chloride 98 98 - 107 mmol/L    Bicarbonate 28 21 - 32 mmol/L    Anion Gap 15 10 - 20 mmol/L    Urea Nitrogen 30 (H) 6 - 23 mg/dL    Creatinine 1.51 (H) 0.50 - 1.05 mg/dL    eGFR 41 (L) >60 mL/min/1.73m*2    Calcium 10.1 8.6 - 10.6 mg/dL    Phosphorus 3.9 2.5 - 4.9 mg/dL    Albumin 4.2 3.4 - 5.0 g/dL   CBC   Result Value Ref Range    WBC 4.0 (L) 4.4 - 11.3 x10*3/uL    nRBC 0.0 0.0 - 0.0 /100 WBCs    RBC 4.42 4.00 - 5.20 x10*6/uL    Hemoglobin 12.7 12.0 - 16.0 g/dL    Hematocrit 39.1 36.0 - 46.0 %    MCV 89 80 - 100 fL    MCH 28.7 26.0 - 34.0 pg    MCHC 32.5 32.0 - 36.0 g/dL    RDW 17.1 (H) 11.5 - 14.5 %    Platelets 191 150 - 450 x10*3/uL   Magnesium   Result Value Ref Range    Magnesium 2.31 1.60 - 2.40 mg/dL        Scheduled medications  acetaminophen, 975 mg, oral, q8h CANDE   Or  acetaminophen, 1,000 mg, oral, q8h CANDE   Or  acetaminophen, 1,000 mg, g-tube, q8h CANDE  amLODIPine, 10 mg, oral, Daily  ampicillin-sulbactam, 3 g, intravenous, q6h  chlorhexidine, 15 mL, Swish & Spit, Before meals & nightly  FLUoxetine, 20 mg, oral, Daily  heparin (porcine), 5,000 Units, subcutaneous, q8h  hydroCHLOROthiazide, 25 mg, oral, Daily  levothyroxine, 100 mcg, oral, q AM  lisinopril, 40 mg, oral, Daily  metoprolol succinate XL, 50 mg, oral, Daily  OLANZapine, 5 mg, oral, Nightly  pantoprazole, 40 mg, oral, Daily before breakfast  pentoxifylline, 400 mg, oral, TID with meals  polyethylene glycol, 17 g, oral, Daily  sennosides-docusate sodium, 2 tablet, oral, BID  spironolactone, 25 mg, oral, Daily  terazosin, 5 mg, oral, q PM    Continuous medications     PRN medications  PRN medications: naloxone, ondansetron **OR** ondansetron, SUMAtriptan       Assessment/Plan   Pamella Rose is a 53 y.o. 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      Active Issues:  ORN  Chronic Osteomyelitis  Facial Cellulitis/Abscess  Class II Obesity  CKD Stage II     Plan:  -Analgesia:  Scheduled Acetaminophen  -FEN: mIVFs,bolus Tube feeds, consult nutrition; monitor and replete electrolytes as needed  -Neuro: restart home Prozac , Zyprexa  -Pulm: wean oxygen to room air,   -ID: Unasyn 3g Q6hrs, follow up cultures, Consult Infectious Disease follow-up antibiotic plan  -Cardiac: Vitals Q4hr; restart home Amplodipine, hydrochlorothiazide, Lisinopril and Aldactone  -Endo: Levothyroxine restarted  -GI: Bowel regimen, PPI,  and PRN anti-emetic  -: voiding, follow-up repeat creatinine and if okay will go ahead with CTA  -Steroids/Special:   -Embolic PPx: SQH, SCDs while in bed  -Dispo: Discharge planning pending cultures and ID recs home with home care vs skilled nursing facility     All plans discussed with attendings after morning rounds.    Justin Matamoros MD  Resident, PGY-1  Otolaryngology - Head & Neck Surgery    ENT Consult Pager: [phone]  Head and Neck Phone: [phone]  ENT Peds Pager: [phone]  ENT Subspecialty Team: Ana

## 2023-11-04 NOTE — SIGNIFICANT EVENT
Chart reviewed, discussed care plan w/ team. Wound cx resulted w/ rare Strep anginosus. As she has had short-courses of PO abx in the past, it would be reasonable to treat her current ORNJ with a longer-course of PO abx such as amox-clav.     Recommend PO amox-clav 875mg BID x 6 weeks for ORNJ, pt should follow up w/ ENT for continued symptom evaluation.     Discussed w. Dr. Pino, will sign off, please page w/ questions      Bess Polk,   Infectious Disease Fellow, PGY5  Epic Chat until 5pm/Team A pager [phone]

## 2023-11-04 NOTE — CARE PLAN
Problem: Pain  Goal: My pain/discomfort is manageable  Outcome: Met     Problem: Safety  Goal: Patient will be injury free during hospitalization  Outcome: Met  Goal: I will remain free of falls  Outcome: Met     Problem: Daily Care  Goal: Daily care needs are met  Outcome: Met     Problem: Psychosocial Needs  Goal: Demonstrates ability to cope with hospitalization/illness  Outcome: Met  Goal: Collaborate with me, my family, and caregiver to identify my specific goals  Outcome: Met     Problem: Discharge Barriers  Goal: My discharge needs are met  Outcome: Progressing   The patient's goals for the shift include      The clinical goals for the shift include Make a plan to go home    A/O x4, VSS, room air, right neck wound packed by ENT team. Patient denies pain at this time. PEG tube, patient will give herself tube feedings. Takes oral medications PO, tolerates well. Patient awaiting culture and treatment from ID in order for discharge home.

## 2023-11-05 VITALS
OXYGEN SATURATION: 97 % | BODY MASS INDEX: 33.29 KG/M2 | RESPIRATION RATE: 13 BRPM | DIASTOLIC BLOOD PRESSURE: 73 MMHG | TEMPERATURE: 97.2 F | WEIGHT: 176.3 LBS | HEIGHT: 61 IN | SYSTOLIC BLOOD PRESSURE: 112 MMHG | HEART RATE: 64 BPM

## 2023-11-05 LAB
ALBUMIN SERPL BCP-MCNC: 4 G/DL (ref 3.4–5)
ANION GAP SERPL CALC-SCNC: 17 MMOL/L (ref 10–20)
BUN SERPL-MCNC: 37 MG/DL (ref 6–23)
CALCIUM SERPL-MCNC: 10.2 MG/DL (ref 8.6–10.6)
CHLORIDE SERPL-SCNC: 99 MMOL/L (ref 98–107)
CO2 SERPL-SCNC: 28 MMOL/L (ref 21–32)
CREAT SERPL-MCNC: 1.6 MG/DL (ref 0.5–1.05)
ERYTHROCYTE [DISTWIDTH] IN BLOOD BY AUTOMATED COUNT: 17.3 % (ref 11.5–14.5)
GFR SERPL CREATININE-BSD FRML MDRD: 38 ML/MIN/1.73M*2
GLUCOSE SERPL-MCNC: 106 MG/DL (ref 74–99)
HCT VFR BLD AUTO: 39.6 % (ref 36–46)
HGB BLD-MCNC: 11.7 G/DL (ref 12–16)
MAGNESIUM SERPL-MCNC: 2.42 MG/DL (ref 1.6–2.4)
MCH RBC QN AUTO: 27.7 PG (ref 26–34)
MCHC RBC AUTO-ENTMCNC: 29.5 G/DL (ref 32–36)
MCV RBC AUTO: 94 FL (ref 80–100)
NRBC BLD-RTO: 0 /100 WBCS (ref 0–0)
PHOSPHATE SERPL-MCNC: 4 MG/DL (ref 2.5–4.9)
PLATELET # BLD AUTO: 189 X10*3/UL (ref 150–450)
POTASSIUM SERPL-SCNC: 4.8 MMOL/L (ref 3.5–5.3)
RBC # BLD AUTO: 4.22 X10*6/UL (ref 4–5.2)
SODIUM SERPL-SCNC: 139 MMOL/L (ref 136–145)
WBC # BLD AUTO: 4.5 X10*3/UL (ref 4.4–11.3)

## 2023-11-05 PROCEDURE — 80069 RENAL FUNCTION PANEL: CPT

## 2023-11-05 PROCEDURE — 36415 COLL VENOUS BLD VENIPUNCTURE: CPT

## 2023-11-05 PROCEDURE — 2500000001 HC RX 250 WO HCPCS SELF ADMINISTERED DRUGS (ALT 637 FOR MEDICARE OP)

## 2023-11-05 PROCEDURE — 99233 SBSQ HOSP IP/OBS HIGH 50: CPT | Performed by: OTOLARYNGOLOGY

## 2023-11-05 PROCEDURE — 2500000001 HC RX 250 WO HCPCS SELF ADMINISTERED DRUGS (ALT 637 FOR MEDICARE OP): Performed by: STUDENT IN AN ORGANIZED HEALTH CARE EDUCATION/TRAINING PROGRAM

## 2023-11-05 PROCEDURE — 96372 THER/PROPH/DIAG INJ SC/IM: CPT | Performed by: STUDENT IN AN ORGANIZED HEALTH CARE EDUCATION/TRAINING PROGRAM

## 2023-11-05 PROCEDURE — 85027 COMPLETE CBC AUTOMATED: CPT

## 2023-11-05 PROCEDURE — 83735 ASSAY OF MAGNESIUM: CPT

## 2023-11-05 PROCEDURE — 2500000004 HC RX 250 GENERAL PHARMACY W/ HCPCS (ALT 636 FOR OP/ED): Performed by: STUDENT IN AN ORGANIZED HEALTH CARE EDUCATION/TRAINING PROGRAM

## 2023-11-05 RX ORDER — ROSUVASTATIN CALCIUM 10 MG/1
10 TABLET, COATED ORAL DAILY
Status: ON HOLD
Start: 2023-11-05 | End: 2024-02-22 | Stop reason: SDUPTHER

## 2023-11-05 RX ORDER — SUMATRIPTAN 50 MG/1
50 TABLET, FILM COATED ORAL AS NEEDED
Qty: 9 TABLET | Refills: 0
Start: 2023-11-05 | End: 2024-02-08 | Stop reason: ALTCHOICE

## 2023-11-05 RX ORDER — LACTOSE-REDUCED FOOD 0.06G-1/ML
1 LIQUID (ML) ORAL DAILY
Start: 2023-11-05 | End: 2024-02-09 | Stop reason: WASHOUT

## 2023-11-05 RX ORDER — NITROGLYCERIN 0.4 MG/1
0.4 TABLET SUBLINGUAL EVERY 5 MIN PRN
Qty: 90 TABLET | Refills: 12
Start: 2023-11-05 | End: 2024-02-08 | Stop reason: ALTCHOICE

## 2023-11-05 RX ORDER — PENTOXIFYLLINE 400 MG/1
400 TABLET, EXTENDED RELEASE ORAL
Start: 2023-11-05 | End: 2023-11-24 | Stop reason: SDUPTHER

## 2023-11-05 RX ORDER — AMLODIPINE BESYLATE 10 MG/1
10 TABLET ORAL DAILY
Status: ON HOLD
Start: 2023-11-06 | End: 2024-02-22 | Stop reason: SDUPTHER

## 2023-11-05 RX ORDER — OMEPRAZOLE 20 MG/1
40 CAPSULE, DELAYED RELEASE ORAL DAILY
Start: 2023-11-05 | End: 2024-02-08 | Stop reason: ALTCHOICE

## 2023-11-05 RX ORDER — ACETAMINOPHEN 160 MG/5ML
1000 LIQUID ORAL EVERY 8 HOURS SCHEDULED
Qty: 1705.2 ML | Refills: 0 | Status: ON HOLD | OUTPATIENT
Start: 2023-11-05 | End: 2024-02-22 | Stop reason: SDUPTHER

## 2023-11-05 RX ORDER — GABAPENTIN 250 MG/5ML
250 SOLUTION ORAL DAILY
Start: 2023-11-05 | End: 2024-02-09 | Stop reason: WASHOUT

## 2023-11-05 RX ORDER — FLUOXETINE 20 MG/1
20 TABLET ORAL DAILY
Start: 2023-11-05 | End: 2024-02-09 | Stop reason: ALTCHOICE

## 2023-11-05 RX ORDER — AMOXICILLIN AND CLAVULANATE POTASSIUM 875; 125 MG/1; MG/1
875 TABLET, FILM COATED ORAL EVERY 12 HOURS SCHEDULED
Qty: 84 TABLET | Refills: 0 | Status: SHIPPED | OUTPATIENT
Start: 2023-11-05 | End: 2023-12-17

## 2023-11-05 RX ORDER — SPIRONOLACTONE 25 MG/1
TABLET ORAL
Status: ON HOLD
Start: 2023-11-05 | End: 2024-02-22 | Stop reason: SDUPTHER

## 2023-11-05 RX ORDER — METOPROLOL SUCCINATE 50 MG/1
50 TABLET, EXTENDED RELEASE ORAL DAILY
Status: ON HOLD
Start: 2023-11-05 | End: 2024-02-22 | Stop reason: SDUPTHER

## 2023-11-05 RX ORDER — CHLORHEXIDINE GLUCONATE ORAL RINSE 1.2 MG/ML
SOLUTION DENTAL
Qty: 473 ML | Refills: 6
Start: 2023-11-05 | End: 2023-11-24 | Stop reason: SDUPTHER

## 2023-11-05 RX ORDER — AMOXICILLIN AND CLAVULANATE POTASSIUM 875; 125 MG/1; MG/1
875 TABLET, FILM COATED ORAL EVERY 12 HOURS SCHEDULED
Status: DISCONTINUED | OUTPATIENT
Start: 2023-11-05 | End: 2023-11-05 | Stop reason: HOSPADM

## 2023-11-05 RX ORDER — PETROLATUM 1 G/G
1 OINTMENT TOPICAL DAILY PRN
Refills: 0
Start: 2023-11-05 | End: 2024-02-25 | Stop reason: HOSPADM

## 2023-11-05 RX ORDER — CHLORHEXIDINE GLUCONATE ORAL RINSE 1.2 MG/ML
15 SOLUTION DENTAL 3 TIMES DAILY
Qty: 473 ML | Refills: 0 | Status: SHIPPED | OUTPATIENT
Start: 2023-11-05 | End: 2023-11-24 | Stop reason: SDUPTHER

## 2023-11-05 RX ORDER — PENTOXIFYLLINE 400 MG/1
400 TABLET, EXTENDED RELEASE ORAL 3 TIMES DAILY
Start: 2023-11-05 | End: 2024-02-08 | Stop reason: ALTCHOICE

## 2023-11-05 RX ORDER — TERAZOSIN 5 MG/1
5 CAPSULE ORAL NIGHTLY
Status: ON HOLD
Start: 2023-11-05 | End: 2024-02-22 | Stop reason: SDUPTHER

## 2023-11-05 RX ORDER — FAMOTIDINE 20 MG/1
TABLET, FILM COATED ORAL
Start: 2023-11-05 | End: 2024-02-09 | Stop reason: ALTCHOICE

## 2023-11-05 RX ORDER — OLANZAPINE 5 MG/1
5 TABLET ORAL NIGHTLY
Start: 2023-11-05 | End: 2024-02-25 | Stop reason: HOSPADM

## 2023-11-05 RX ORDER — LEVOTHYROXINE SODIUM 100 UG/1
100 TABLET ORAL
Start: 2023-11-05 | End: 2023-12-22 | Stop reason: SDUPTHER

## 2023-11-05 RX ORDER — OLANZAPINE 5 MG/1
5 TABLET ORAL NIGHTLY
Start: 2023-11-05 | End: 2023-11-24 | Stop reason: SDUPTHER

## 2023-11-05 RX ORDER — LISINOPRIL 40 MG/1
40 TABLET ORAL DAILY
Status: ON HOLD
Start: 2023-11-07 | End: 2024-02-22 | Stop reason: SDUPTHER

## 2023-11-05 RX ORDER — HYDROCHLOROTHIAZIDE 25 MG/1
25 TABLET ORAL DAILY
Start: 2023-11-05 | End: 2024-02-08 | Stop reason: ALTCHOICE

## 2023-11-05 RX ADMIN — ACETAMINOPHEN 975 MG: 325 TABLET ORAL at 13:22

## 2023-11-05 RX ADMIN — SPIRONOLACTONE 25 MG: 25 TABLET ORAL at 09:02

## 2023-11-05 RX ADMIN — HEPARIN SODIUM 5000 UNITS: 5000 INJECTION INTRAVENOUS; SUBCUTANEOUS at 01:51

## 2023-11-05 RX ADMIN — AMOXICILLIN AND CLAVULANATE POTASSIUM 875 MG: 875; 125 TABLET, FILM COATED ORAL at 10:14

## 2023-11-05 RX ADMIN — CHLORHEXIDINE GLUCONATE 0.12% ORAL RINSE 15 ML: 1.2 LIQUID ORAL at 06:39

## 2023-11-05 RX ADMIN — LISINOPRIL 40 MG: 40 TABLET ORAL at 09:02

## 2023-11-05 RX ADMIN — LEVOTHYROXINE SODIUM 100 MCG: 100 TABLET ORAL at 09:02

## 2023-11-05 RX ADMIN — AMLODIPINE BESYLATE 10 MG: 10 TABLET ORAL at 09:02

## 2023-11-05 RX ADMIN — ACETAMINOPHEN 975 MG: 325 TABLET ORAL at 06:39

## 2023-11-05 RX ADMIN — AMPICILLIN SODIUM AND SULBACTAM SODIUM 3 G: 2; 1 INJECTION, POWDER, FOR SOLUTION INTRAMUSCULAR; INTRAVENOUS at 06:39

## 2023-11-05 RX ADMIN — FLUOXETINE 20 MG: 20 CAPSULE ORAL at 09:02

## 2023-11-05 RX ADMIN — PANTOPRAZOLE SODIUM 40 MG: 40 TABLET, DELAYED RELEASE ORAL at 06:39

## 2023-11-05 RX ADMIN — PENTOXIFYLLINE 400 MG: 400 TABLET, EXTENDED RELEASE ORAL at 09:02

## 2023-11-05 RX ADMIN — METOPROLOL SUCCINATE 50 MG: 50 TABLET, EXTENDED RELEASE ORAL at 09:02

## 2023-11-05 RX ADMIN — HYDROCHLOROTHIAZIDE 25 MG: 25 TABLET ORAL at 09:02

## 2023-11-05 RX ADMIN — HEPARIN SODIUM 5000 UNITS: 5000 INJECTION INTRAVENOUS; SUBCUTANEOUS at 09:03

## 2023-11-05 RX ADMIN — PENTOXIFYLLINE 400 MG: 400 TABLET, EXTENDED RELEASE ORAL at 11:41

## 2023-11-05 RX ADMIN — CHLORHEXIDINE GLUCONATE 0.12% ORAL RINSE 15 ML: 1.2 LIQUID ORAL at 09:05

## 2023-11-05 ASSESSMENT — COGNITIVE AND FUNCTIONAL STATUS - GENERAL
MOBILITY SCORE: 24
DAILY ACTIVITIY SCORE: 24

## 2023-11-05 ASSESSMENT — PAIN SCALES - GENERAL
PAINLEVEL_OUTOF10: 0 - NO PAIN
PAINLEVEL_OUTOF10: 0 - NO PAIN

## 2023-11-05 ASSESSMENT — PAIN - FUNCTIONAL ASSESSMENT
PAIN_FUNCTIONAL_ASSESSMENT: 0-10
PAIN_FUNCTIONAL_ASSESSMENT: 0-10

## 2023-11-05 NOTE — NURSING NOTE
Discharge summary discussed, follow-up appointments discussed, wound-care discussed. VSS, room air, stable, denies pain. Patient being picked up by boyfriend. Refusing to go down by wheelchair and transport. All needs met, patient feels safe going home.

## 2023-11-05 NOTE — PROGRESS NOTES
"Pamella Rose is a 53 y.o. female on day 5 of admission presenting with Osteoradionecrosis of jaw.     Subjective   Patient doing well. No complaints of pain.  No acute events overnight.  Continuing twice daily packing.      Objective     Physical Exam  CONSTITUTIONAL:  No acute distress  VOICE:  No hoarseness or other abnormality  RESPIRATION:  Breathing comfortably, no stridor  CV:  No clubbing/cyanosis/edema in hands  EYES:  EOM intact, sclera normal, mild ectropion  NEURO:  Alert and oriented times 3, Cranial nerves II-XII grossly intact and symmetric bilaterally  HEAD AND FACE:  Facial swelling stable, significant radiation pigmentation and thickening to facial skin, multiple open draining wounds, +purulence  SALIVARY GLANDS:  Parotid and submandibular glands normal bilaterally  EARS:  Normal external ears, external auditory canals, and TMs to otoscopy, normal hearing to whispered voice.  NOSE:  External nose midline, anterior rhinoscopy is normal with limited visualization to the anterior aspect of the interior turbinates, no bleeding or drainage, no lesions  ORAL CAVITY/OROPHARYNX/LIPS:  Normal mucous membranes, normal floor of mouth/tongue/OP, no masses or lesions, no exposed bone  PHARYNGEAL WALLS:  No masses or lesions  NECK/LYMPH:  No LAD, no thyroid masses, trachea midline  SKIN:  Neck skin is without scar or injury  PSYCH:  Alert and oriented with appropriate mood and affect    Last Recorded Vitals  Blood pressure 101-121/66-78, pulse 60-67, temperature 36.1 °C- 36.7 °C(97.1 °98.6 °F), temperature source Temporal, resp. rate 16, height 1.549 m (5' 0.98\"), weight 79.6 kg (175 lb 7.8 oz), SpO2  %.    I/O last 24 h  Intake 0 ml  Output 1800 ml    Intake/Output last 3 Shifts:  I/O last 3 completed shifts:  In: 1120 (14 mL/kg) [NG/GT:620; IV Piggyback:500]  Out: 2600 (32.5 mL/kg) [Urine:2600 (0.9 mL/kg/hr)]  Weight: 80 kg     Relevant Results  No results found for this or any previous visit (from the " past 24 hour(s)).       Scheduled medications  acetaminophen, 975 mg, oral, q8h CANDE   Or  acetaminophen, 1,000 mg, oral, q8h CANDE   Or  acetaminophen, 1,000 mg, g-tube, q8h CANDE  amLODIPine, 10 mg, oral, Daily  amoxicillin-pot clavulanate, 875 mg, oral, q12h CANDE  chlorhexidine, 15 mL, Swish & Spit, Before meals & nightly  FLUoxetine, 20 mg, oral, Daily  heparin (porcine), 5,000 Units, subcutaneous, q8h  hydroCHLOROthiazide, 25 mg, oral, Daily  levothyroxine, 100 mcg, oral, q AM  lisinopril, 40 mg, oral, Daily  metoprolol succinate XL, 50 mg, oral, Daily  OLANZapine, 5 mg, oral, Nightly  pantoprazole, 40 mg, oral, Daily before breakfast  pentoxifylline, 400 mg, oral, TID with meals  polyethylene glycol, 17 g, oral, Daily  sennosides-docusate sodium, 2 tablet, oral, BID  spironolactone, 25 mg, oral, Daily  terazosin, 5 mg, oral, q PM    Continuous medications     PRN medications  PRN medications: naloxone, ondansetron **OR** ondansetron, SUMAtriptan       Assessment/Plan   Pamella Rose is a 53 y.o. female with history of right maxillary [id number] SCC s/p right maxillectomy, right SND (Ib-IV) left SND (I-III), dobhoff, recon with L scapula c/b flap failure s/p revision R scapula reconstruction now presenting with concern for osteoradionecrosis and wound drainage.      Active Issues:  ORN  Chronic Osteomyelitis  Facial Cellulitis/Abscess  Class II Obesity  CKD Stage II     Plan:  -Analgesia:  Scheduled Acetaminophen  -FEN: mIVFs,bolus Tube feeds, consult nutrition; monitor and replete electrolytes as needed  -Neuro: restart home Prozac , Zyprexa  -Pulm: wean oxygen to room air,   -ID: Switch to augmentin  -Cardiac: Vitals Q4hr; restart home Amplodipine, hydrochlorothiazide, Lisinopril and Aldactone  -Endo: Levothyroxine restarted  -GI: Bowel regimen, PPI,  and PRN anti-emetic  -: voiding, follow-up repeat creatinine and if okay will go ahead with CTA  -Steroids/Special:   -Embolic PPx: SQH, SCDs while in bed  -Dispo:  Discharge planning pending cultures and ID recs home with home care vs skilled nursing facility    Nugauze packing changed this morning.     Switch to augmentin. Discharge pending creatinine and CTA. ARVIN Matamoros MD  Resident, PGY-1  Otolaryngology - Head & Neck Surgery    ENT Consult Pager: [phone]  Head and Neck Phone: [phone]  ENT Peds Pager: [phone]  ENT Subspecialty Team: Ana       Patient seen and examined  Will discuss whether it is safe to proceed with CT-A at this time in light of her acute kidney injury on top of her chronic kidney disease   Otherwise she may be able to be discharged home today  Darlin Benson MD    Head & Neck Surgical Oncology & Reconstruction  Department of Otolaryngology - Head and Neck Surgery

## 2023-11-05 NOTE — CARE PLAN
The patient's goals for the shift include      The clinical goals for the shift include Go home    A/O x4, VSS, room air, transitioned to PO ABT. Pt denies pain. PEG in place, does her own care and bolus feeds. Awaiting cultures to determine care for discharge. All needs met, WCTM.

## 2023-11-05 NOTE — DISCHARGE SUMMARY
Discharge Diagnosis  Osteoradionecrosis of jaw    Issues Requiring Follow-Up  Osteoradionecrosis     Test Results Pending At Discharge  Pending Labs       No current pending labs.            Hospital Course  53yoF with PMH h/o CKD baseline Cr 1.21 and right maxillary SCC s/p maxillectomy with L scap recon requiring revision recon with right scap, presented with c/f osteoradionecrosis with draining wound of right neck admitted for IV abx and further workup. BID wound packing initiated. Wound culture revealed strep anginosus, ID consulted and recommended 6 weeks of Augmentin. Dressing change education was reviewed. Patient developed ROE on CKD, so CTA for surgical planning was deferred. On the day of discharge, the pt was tolerating tube feeds and they were ambulating and voiding spontaneously. They were discharged home in stable condition with instructions to follow up as outpatient.    Pertinent Physical Exam At Time of Discharge  CONSTITUTIONAL:  No acute distress  VOICE:  No hoarseness or other abnormality  RESPIRATION:  Breathing comfortably, no stridor  CV:  No clubbing/cyanosis/edema in hands  EYES:  EOM intact, sclera normal, mild ectropion  NEURO:  Alert and oriented times 3, Cranial nerves II-XII grossly intact and symmetric bilaterally  HEAD AND FACE:  Facial swelling stable, significant radiation pigmentation and thickening to facial skin, multiple open draining wounds, +purulence  SALIVARY GLANDS:  Parotid and submandibular glands normal bilaterally  EARS:  Normal external ears, external auditory canals, and TMs to otoscopy, normal hearing to whispered voice.  NOSE:  External nose midline, anterior rhinoscopy is normal with limited visualization to the anterior aspect of the interior turbinates, no bleeding or drainage, no lesions  ORAL CAVITY/OROPHARYNX/LIPS:  Normal mucous membranes, normal floor of mouth/tongue/OP, no masses or lesions, no exposed bone  PHARYNGEAL WALLS:  No masses or  lesions  NECK/LYMPH:  No LAD, no thyroid masses, trachea midline  SKIN:  Neck skin is without scar or injury  PSYCH:  Alert and oriented with appropriate mood and affect    Home Medications     Medication List      START taking these medications     acetaminophen 650 mg/20.3 mL solution oral liquid; Commonly known as:   Tylenol; 31.2 mL (1,000 mg) by g-tube route every 8 hours for 14 days.;   Replaces: Children's acetaminophen 160 mg/5 mL liquid   amoxicillin-pot clavulanate 875-125 mg tablet; Commonly known as:   Augmentin; Take 1 tablet (875 mg) by mouth every 12 hours.     CHANGE how you take these medications     amLODIPine 10 mg tablet; Commonly known as: Norvasc; Take 1 tablet (10   mg) by mouth once daily. Do not start before November 6, 2023.; Start   taking on: November 6, 2023; What changed: See the new instructions.   * chlorhexidine 0.12 % solution; Commonly known as: Peridex; RINSE WITH   15 ML FOUR TIMES A DAY AFTER MEALS AND AT BEDTIME; What changed: Another   medication with the same name was changed. Make sure you understand how   and when to take each.   * chlorhexidine 0.12 % solution; Commonly known as: Peridex; Use 15 mL   in the mouth or throat 3 times a day for 7 days.; What changed: when to   take this, additional instructions   lisinopril 40 mg tablet; Take 1 tablet (40 mg) by mouth once daily. Do   not start before November 7, 2023.; Start taking on: November 7, 2023;   What changed: These instructions start on November 7, 2023. If you are   unsure what to do until then, ask your doctor or other care provider.   nitroglycerin 0.4 mg SL tablet; Commonly known as: Nitrostat; Place 1   tablet (0.4 mg) under the tongue every 5 minutes if needed for chest pain.    PLACE 1 TABLET UNDER THE TONGUE EVERY 5 MINUTES FOR UP TO 3 DOSES AS   NEEDED FOR CHEST PAIN.CALL 911 IF PAIN PERSISTS.; What changed: how much   to take, how to take this, when to take this, reasons to take this   terazosin 5 mg  capsule; Commonly known as: Hytrin; Take 1 capsule (5 mg)   by mouth once daily at bedtime.; What changed: when to take this   white petrolatum 41 % ointment ointment; Commonly known as: Aquaphor;   Apply 1 Application topically once daily as needed (skin care).  APPLY   SPARINGLY TO AFFECTED AREA(S) 3 TIMES A DAY; What changed: how much to   take, how to take this, when to take this, reasons to take this  * This list has 2 medication(s) that are the same as other medications   prescribed for you. Read the directions carefully, and ask your doctor or   other care provider to review them with you.     CONTINUE taking these medications     Boost High Protein 0.06 gram- 1 kcal/mL liquid; Generic drug: food   supplemt, lactose-reduced; Take 1 Can by mouth once daily.   famotidine 20 mg tablet; Commonly known as: Pepcid; 1 tablet DAILY   (route: oral)   FLUoxetine 20 mg tablet; Commonly known as: PROzac; Take 1 tablet (20   mg) by mouth once daily.   gabapentin 250 mg/5 mL solution; Commonly known as: Neurontin; Take 5 mL   (250 mg) by mouth once daily.   hydroCHLOROthiazide 25 mg tablet; Commonly known as: HYDRODiuril; Take 1   tablet (25 mg) by mouth once daily.   levothyroxine 100 mcg tablet; Commonly known as: LevoxyL; Take 1 tablet   (100 mcg) by mouth once daily in the morning. Take before meals. Must take   on an empty stomach, 30min to 1 hour before breakfast or other meds.   metoprolol succinate XL 50 mg 24 hr tablet; Commonly known as:   Toprol-XL; Take 1 tablet (50 mg) by mouth once daily. Do not crush or   chew.   * OLANZapine 5 mg tablet; Commonly known as: ZyPREXA; TAKE 1 TABLET BY   MOUTH ONCE DAILY AT BEDTIME   * OLANZapine 5 mg tablet; Commonly known as: ZyPREXA; Take 1 tablet (5   mg) by mouth once daily at bedtime.   omeprazole 20 mg DR capsule; Commonly known as: PriLOSEC; Take 2   capsules (40 mg) by mouth once daily.   * pentoxifylline 400 mg ER tablet; Commonly known as: Trental; TAKE 1   TABLET 3  TIMES DAILY.   * pentoxifylline 400 mg ER tablet; Commonly known as: Trental; Take 1   tablet (400 mg) by mouth 3 times a day with meals.   rosuvastatin 10 mg tablet; Commonly known as: Crestor; Take 1 tablet (10   mg) by mouth once daily.   spironolactone 25 mg tablet; Commonly known as: Aldactone; 1 tablet   DAILY (route: oral)   SUMAtriptan 50 mg tablet; Commonly known as: Imitrex; Take 1 tablet (50   mg) by mouth if needed for migraine.  * This list has 4 medication(s) that are the same as other medications   prescribed for you. Read the directions carefully, and ask your doctor or   other care provider to review them with you.     STOP taking these medications     aspirin 325 mg tablet   Children's acetaminophen 160 mg/5 mL liquid; Generic drug:   acetaminophen; Replaced by: acetaminophen 650 mg/20.3 mL solution oral   liquid   hydrALAZINE 50 mg tablet; Commonly known as: Apresoline   Narcan 4 mg/0.1 mL nasal spray; Generic drug: naloxone   oxyCODONE 5 mg/5 mL solution; Commonly known as: Roxicodone       Outpatient Follow-Up  Future Appointments   Date Time Provider Department Center   11/7/2023 11:45 AM [facility] [id number] WOUND, WOUNDCARE RESOURCE [id number] [place]   12/7/2023  1:00 PM Rohit Boles PA-C [id number] [facility]       Deena Nunez MD

## 2023-11-06 ENCOUNTER — APPOINTMENT (OUTPATIENT)
Dept: WOUND CARE | Facility: CLINIC | Age: 53
End: 2023-11-06
Payer: COMMERCIAL

## 2023-11-06 ENCOUNTER — PHARMACY VISIT (OUTPATIENT)
Dept: PHARMACY | Facility: CLINIC | Age: 53
End: 2023-11-06
Payer: MEDICAID

## 2023-11-06 PROCEDURE — RXMED WILLOW AMBULATORY MEDICATION CHARGE

## 2023-11-07 ENCOUNTER — APPOINTMENT (OUTPATIENT)
Dept: WOUND CARE | Facility: CLINIC | Age: 53
End: 2023-11-07
Payer: COMMERCIAL

## 2023-11-24 ENCOUNTER — OFFICE VISIT (OUTPATIENT)
Dept: OTOLARYNGOLOGY | Facility: HOSPITAL | Age: 53
End: 2023-11-24
Payer: COMMERCIAL

## 2023-11-24 VITALS — HEIGHT: 60 IN | BODY MASS INDEX: 33.4 KG/M2 | TEMPERATURE: 98.4 F | WEIGHT: 170.1 LBS
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
## 2023-11-24 DIAGNOSIS — M27.2 OSTEORADIONECROSIS OF MANDIBLE: Primary | ICD-10-CM

## 2023-11-24 PROCEDURE — 99213 OFFICE O/P EST LOW 20 MIN: CPT | Performed by: OTOLARYNGOLOGY

## 2023-11-24 RX ORDER — PANTOPRAZOLE SODIUM 40 MG/1
40 TABLET, DELAYED RELEASE ORAL
COMMUNITY
Start: 2023-11-02 | End: 2023-11-24 | Stop reason: DRUGHIGH

## 2023-11-24 RX ORDER — PENICILLIN V POTASSIUM 500 MG/1
TABLET, FILM COATED ORAL
Status: ON HOLD | COMMUNITY
Start: 2022-01-26 | End: 2024-02-22 | Stop reason: WASHOUT

## 2023-11-24 RX ORDER — METHOCARBAMOL 750 MG/1
TABLET, FILM COATED ORAL
COMMUNITY
Start: 2021-02-22 | End: 2024-02-08 | Stop reason: ALTCHOICE

## 2023-11-24 ASSESSMENT — PATIENT HEALTH QUESTIONNAIRE - PHQ9
SUM OF ALL RESPONSES TO PHQ9 QUESTIONS 1 & 2: 0
1. LITTLE INTEREST OR PLEASURE IN DOING THINGS: NOT AT ALL
2. FEELING DOWN, DEPRESSED OR HOPELESS: NOT AT ALL

## 2023-11-24 NOTE — PROGRESS NOTES
ENT Follow up Visit    Chief Complaint: Neck drainage    History Of Present Illness  Pamella Rose is a 53 y.o. female presents for follow up.  She had a [id number] right maxillary cancer s/p resection with scapula free flap. Had to have a revision scapula due to loss of first flap. She has been dealing with ORN of the mandible. Is on HBO, received 4 treatments. Neck started drainage 2 weeks ago. Has been on antibiotics. No longer draining. Tolerating abx and HBO. Feels hearing decreased but no pain/pressure.    :    Today she reports no more drainage. No longer packing wound. Still on augmentin. Has not restarted her Hbo.    Past Medical History  She has no past medical history on file.    Surgical History  She has a past surgical history that includes  section, classic (2016); IR intervention venous sampling (2021); and IR intervention venous sampling (2021).     Social History  She reports that she has been smoking cigarettes. She has been smoking an average of .25 packs per day. She has never been exposed to tobacco smoke. She has never used smokeless tobacco. She reports that she does not drink alcohol and does not use drugs.    Family History  Family History   Problem Relation Name Age of Onset    Other (CABG) Mother      Colon cancer Other grandfather         Allergies  Patient has no known allergies.    Review of Systems     Exam:  NAD alert  bharti eomi NCAT  mild ectropion  facial swelling improved  bl eac and TMs clear  not much movement midface  significant radiation pigmentation and thickening to the facial skin  Prior area of drainage has healed, no purulence expressed  no obvious NEHEMIAS  ocop: No exposed bone, normal mucosa exam, stable pinpoint hole in midline palate; non tender to palpation over mandible     Last Recorded Vitals  Temperature 36.9 °C (98.4 °F), height 1.524 m (5'), weight 77.2 kg (170 lb 1.6 oz).    Result Date: 2023  Normal sinus rhythm Normal ECG When  compared with ECG of 10-MAR-2022 13:39, Nonspecific T wave abnormality no longer evident in Inferior leads T wave inversion no longer evident in Lateral leads Confirmed by Kiet White ([id number]) on 9/25/2023 8:22:47 AM    XR chest 2 view    Result Date: 9/15/2023  Interpreted By:  REGINO WOODSON MD MRN: [id number] Patient Name: SABIHA TERRY  STUDY:  CHEST 2 VIEW PA AND LAT;  9/14/2023 1:00 pm  INDICATION: XR    CHEST      L59.8.  COMPARISON: Chest radiograph 06/23/2022  ACCESSION NUMBER(S): [id number]  ORDERING CLINICIAN: ASHLEE SKINNER  FINDINGS:   CARDIOMEDIASTINAL SILHOUETTE: Cardiomediastinal silhouette is unchanged.  LUNGS: No consolidation, pneumothorax, or effusion.  ABDOMEN: No remarkable upper abdominal findings.  BONES: No acute osseous changes.  Remaining findings appear stable since prior comparison radiograph.      1.  No evidence of acute cardiopulmonary process.       Assessment/Plan   54 yo woman s/p right maxillectomy scapula free flap for [id number] scca, adjuvant chemorad  Now with ORN of the mandible    -cont abx  -restart hbo  -RTC after completion of therapy in early January  -will try to see how much bone heals before committing to a surgery      Alfredo Roy MD

## 2023-12-07 DIAGNOSIS — C41.1 CANCER OF INFERIOR MAXILLA (MULTI): Primary | ICD-10-CM

## 2023-12-08 ENCOUNTER — TELEPHONE (OUTPATIENT)
Dept: HEMATOLOGY/ONCOLOGY | Facility: HOSPITAL | Age: 53
End: 2023-12-08
Payer: COMMERCIAL

## 2023-12-08 NOTE — TELEPHONE ENCOUNTER
Spoke with patient yesterday, 12/7/23, regarding her missed appointment with Vicky Boles. Patient is currently being followed closely with Dr. Delgado. Patient aware she is rescheduled with Vicky Boles on 2/8/24 at 1PM.     Social work contact to help assist with transportation for that appointment.

## 2023-12-12 ENCOUNTER — APPOINTMENT (OUTPATIENT)
Dept: RADIOLOGY | Facility: HOSPITAL | Age: 53
End: 2023-12-12
Payer: COMMERCIAL

## 2023-12-14 DIAGNOSIS — N18.30 STAGE 3 CHRONIC KIDNEY DISEASE, UNSPECIFIED WHETHER STAGE 3A OR 3B CKD (MULTI): Primary | ICD-10-CM

## 2023-12-20 DIAGNOSIS — N18.30 STAGE 3 CHRONIC KIDNEY DISEASE, UNSPECIFIED WHETHER STAGE 3A OR 3B CKD (MULTI): Primary | ICD-10-CM

## 2023-12-21 ENCOUNTER — LAB (OUTPATIENT)
Dept: LAB | Facility: HOSPITAL | Age: 53
End: 2023-12-21
Payer: COMMERCIAL

## 2023-12-21 DIAGNOSIS — E03.9 HYPOTHYROIDISM, UNSPECIFIED TYPE: ICD-10-CM

## 2023-12-21 DIAGNOSIS — N18.30 STAGE 3 CHRONIC KIDNEY DISEASE, UNSPECIFIED WHETHER STAGE 3A OR 3B CKD (MULTI): ICD-10-CM

## 2023-12-21 DIAGNOSIS — C41.1 CANCER OF INFERIOR MAXILLA (MULTI): ICD-10-CM

## 2023-12-21 LAB
ALBUMIN SERPL BCP-MCNC: 4.3 G/DL (ref 3.4–5)
ALP SERPL-CCNC: 51 U/L (ref 33–110)
ALT SERPL W P-5'-P-CCNC: 9 U/L (ref 7–45)
ANION GAP SERPL CALC-SCNC: 12 MMOL/L (ref 10–20)
AST SERPL W P-5'-P-CCNC: 10 U/L (ref 9–39)
BASOPHILS # BLD AUTO: 0.02 X10*3/UL (ref 0–0.1)
BASOPHILS NFR BLD AUTO: 0.4 %
BILIRUB SERPL-MCNC: 0.3 MG/DL (ref 0–1.2)
BUN SERPL-MCNC: 21 MG/DL (ref 6–23)
CALCIUM SERPL-MCNC: 9.8 MG/DL (ref 8.6–10.3)
CHLORIDE SERPL-SCNC: 104 MMOL/L (ref 98–107)
CO2 SERPL-SCNC: 29 MMOL/L (ref 21–32)
CREAT SERPL-MCNC: 1.29 MG/DL (ref 0.5–1.05)
EOSINOPHIL # BLD AUTO: 0.23 X10*3/UL (ref 0–0.7)
EOSINOPHIL NFR BLD AUTO: 4.5 %
ERYTHROCYTE [DISTWIDTH] IN BLOOD BY AUTOMATED COUNT: 17.6 % (ref 11.5–14.5)
GFR SERPL CREATININE-BSD FRML MDRD: 49 ML/MIN/1.73M*2
GLUCOSE SERPL-MCNC: 152 MG/DL (ref 74–99)
HCT VFR BLD AUTO: 42.5 % (ref 36–46)
HGB BLD-MCNC: 13.1 G/DL (ref 12–16)
IMM GRANULOCYTES # BLD AUTO: 0.01 X10*3/UL (ref 0–0.7)
IMM GRANULOCYTES NFR BLD AUTO: 0.2 % (ref 0–0.9)
LYMPHOCYTES # BLD AUTO: 1.79 X10*3/UL (ref 1.2–4.8)
LYMPHOCYTES NFR BLD AUTO: 35.1 %
MCH RBC QN AUTO: 29.2 PG (ref 26–34)
MCHC RBC AUTO-ENTMCNC: 30.8 G/DL (ref 32–36)
MCV RBC AUTO: 95 FL (ref 80–100)
MONOCYTES # BLD AUTO: 0.43 X10*3/UL (ref 0.1–1)
MONOCYTES NFR BLD AUTO: 8.4 %
NEUTROPHILS # BLD AUTO: 2.62 X10*3/UL (ref 1.2–7.7)
NEUTROPHILS NFR BLD AUTO: 51.4 %
NRBC BLD-RTO: 0 /100 WBCS (ref 0–0)
PLATELET # BLD AUTO: 196 X10*3/UL (ref 150–450)
POTASSIUM SERPL-SCNC: 4.4 MMOL/L (ref 3.5–5.3)
PROT SERPL-MCNC: 8 G/DL (ref 6.4–8.2)
RBC # BLD AUTO: 4.49 X10*6/UL (ref 4–5.2)
SODIUM SERPL-SCNC: 141 MMOL/L (ref 136–145)
T4 FREE SERPL-MCNC: 0.55 NG/DL (ref 0.78–1.48)
TSH SERPL-ACNC: 50.09 MIU/L (ref 0.44–3.98)
WBC # BLD AUTO: 5.1 X10*3/UL (ref 4.4–11.3)

## 2023-12-21 PROCEDURE — 85025 COMPLETE CBC W/AUTO DIFF WBC: CPT

## 2023-12-21 PROCEDURE — 84443 ASSAY THYROID STIM HORMONE: CPT

## 2023-12-21 PROCEDURE — 84439 ASSAY OF FREE THYROXINE: CPT

## 2023-12-21 PROCEDURE — 80053 COMPREHEN METABOLIC PANEL: CPT

## 2023-12-21 PROCEDURE — 36415 COLL VENOUS BLD VENIPUNCTURE: CPT

## 2023-12-22 ENCOUNTER — TELEPHONE (OUTPATIENT)
Dept: HEMATOLOGY/ONCOLOGY | Facility: HOSPITAL | Age: 53
End: 2023-12-22
Payer: COMMERCIAL

## 2023-12-22 DIAGNOSIS — E03.8 OTHER SPECIFIED HYPOTHYROIDISM: ICD-10-CM

## 2023-12-22 DIAGNOSIS — C41.1: Primary | ICD-10-CM

## 2023-12-22 DIAGNOSIS — M27.2 INFLAMMATORY CONDITIONS OF JAWS: Primary | ICD-10-CM

## 2023-12-22 PROCEDURE — RXMED WILLOW AMBULATORY MEDICATION CHARGE

## 2023-12-22 RX ORDER — LEVOTHYROXINE SODIUM 100 UG/1
100 TABLET ORAL
Qty: 60 TABLET | Refills: 3 | Status: SHIPPED | OUTPATIENT
Start: 2023-12-22 | End: 2024-02-09 | Stop reason: DRUGHIGH

## 2023-12-22 NOTE — TELEPHONE ENCOUNTER
Called patient to review TSH lab result. TSH on 12/21 resulted as 50. I told patient that she needs to be taking her levothyroxine every morning before meals and other medications. She said that she has forgotten to take it on several occasions.     I reviewed the complications that can occur from severe hypothyroidism including, but not limited to, cardiac, GI, neurological, and respiratory. I informed patient to seek immediate medical attention if she experiences any sudden SOB, chest pain, heart palpitations, change in mentation. She voiced understanding.     Patient agreeable to repeat lab work the week of Jan 22, 2024.     Vicky Boles updated on the conversation.

## 2023-12-26 ENCOUNTER — PHARMACY VISIT (OUTPATIENT)
Dept: PHARMACY | Facility: CLINIC | Age: 53
End: 2023-12-26
Payer: MEDICAID

## 2023-12-26 ENCOUNTER — HOSPITAL ENCOUNTER (OUTPATIENT)
Dept: RADIOLOGY | Facility: HOSPITAL | Age: 53
Discharge: HOME | End: 2023-12-26
Payer: COMMERCIAL
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
## 2023-12-26 DIAGNOSIS — M27.2 OSTEORADIONECROSIS OF MANDIBLE: ICD-10-CM

## 2023-12-26 PROCEDURE — RXMED WILLOW AMBULATORY MEDICATION CHARGE

## 2023-12-26 PROCEDURE — 75635 CT ANGIO ABDOMINAL ARTERIES: CPT

## 2023-12-26 PROCEDURE — 75635 CT ANGIO ABDOMINAL ARTERIES: CPT | Performed by: RADIOLOGY

## 2023-12-26 PROCEDURE — 2550000001 HC RX 255 CONTRASTS: Mod: SE | Performed by: OTOLARYNGOLOGY

## 2023-12-26 PROCEDURE — 2500000004 HC RX 250 GENERAL PHARMACY W/ HCPCS (ALT 636 FOR OP/ED): Mod: SE

## 2023-12-26 RX ORDER — SODIUM CHLORIDE, SODIUM LACTATE, POTASSIUM CHLORIDE, CALCIUM CHLORIDE 600; 310; 30; 20 MG/100ML; MG/100ML; MG/100ML; MG/100ML
INJECTION, SOLUTION INTRAVENOUS
Status: COMPLETED
Start: 2023-12-26 | End: 2023-12-26

## 2023-12-26 RX ADMIN — IOHEXOL 120 ML: 350 INJECTION, SOLUTION INTRAVENOUS at 16:57

## 2023-12-26 RX ADMIN — SODIUM CHLORIDE, SODIUM LACTATE, POTASSIUM CHLORIDE, AND CALCIUM CHLORIDE: .6; .31; .03; .02 INJECTION, SOLUTION INTRAVENOUS at 14:00

## 2024-01-12 ENCOUNTER — OFFICE VISIT (OUTPATIENT)
Dept: OTOLARYNGOLOGY | Facility: HOSPITAL | Age: 54
End: 2024-01-12
Payer: COMMERCIAL

## 2024-01-12 ENCOUNTER — PHARMACY VISIT (OUTPATIENT)
Dept: PHARMACY | Facility: CLINIC | Age: 54
End: 2024-01-12
Payer: MEDICAID

## 2024-01-12 VITALS — WEIGHT: 173.4 LBS | TEMPERATURE: 97.7 F | BODY MASS INDEX: 32.74 KG/M2 | HEIGHT: 61 IN

## 2024-01-12 DIAGNOSIS — M27.2 OSTEORADIONECROSIS OF JAW: Primary | ICD-10-CM
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
## 2024-01-12 DIAGNOSIS — C41.1 CANCER OF INFERIOR MAXILLA (MULTI): ICD-10-CM

## 2024-01-12 PROCEDURE — RXMED WILLOW AMBULATORY MEDICATION CHARGE

## 2024-01-12 PROCEDURE — 99213 OFFICE O/P EST LOW 20 MIN: CPT | Performed by: OTOLARYNGOLOGY

## 2024-01-12 RX ORDER — AMOXICILLIN AND CLAVULANATE POTASSIUM 875; 125 MG/1; MG/1
1 TABLET, FILM COATED ORAL 2 TIMES DAILY
Qty: 52 TABLET | Refills: 0 | Status: SHIPPED | OUTPATIENT
Start: 2024-01-12 | End: 2024-02-07

## 2024-01-12 RX ORDER — OMEPRAZOLE 20 MG/1
20 CAPSULE, DELAYED RELEASE ORAL DAILY
Qty: 30 CAPSULE | Refills: 11 | Status: SHIPPED | OUTPATIENT
Start: 2024-01-12 | End: 2024-02-25 | Stop reason: HOSPADM

## 2024-01-12 NOTE — PROGRESS NOTES
ENT Follow up Visit    Chief Complaint: Neck drainage    History Of Present Illness  Pamella Rose is a 53 y.o. female presents for follow up.  She had a [id number] right maxillary cancer s/p resection with scapula free flap. Had to have a revision scapula due to loss of first flap. She has been dealing with ORN of the mandible and was on HBO. She was admitted with neck drainage on -23 and was last seen on 23. At that time, there was no more drainage and had not restarted her HBO.    Today she reports that she overall is doing well.  She denies any new fevers.  She denies any new drainage.  She still endorses some tightness of her right neck and overall dull discomfort of her mandible.      Past Medical History  She has no past medical history on file.    Surgical History  She has a past surgical history that includes  section, classic (2016); IR intervention venous sampling (2021); IR intervention venous sampling (2021); and CT angio aorta and bilateral iliofemoral runoff w and or wo IV contrast (2023).     Social History  She reports that she has quit smoking. Her smoking use included cigarettes. She smoked an average of .25 packs per day. She has never been exposed to tobacco smoke. She has never used smokeless tobacco. She reports that she does not drink alcohol and does not use drugs.    Family History  Family History   Problem Relation Name Age of Onset    Other (CABG) Mother      Colon cancer Other grandfather         Allergies  Patient has no known allergies.    Review of Systems     Exam:  NAD alert  bharti eomi NCAT  mild ectropion  facial swelling stable  significant radiation pigmentation and thickening to the facial skin  Prior area of drainage has healed, no purulence expressed  no obvious NEHEMIAS  ocop: No exposed bone, normal mucosa exam, stable pinpoint hole in midline palate; non tender to palpation over mandible     Last Recorded Vitals  Temperature 36.5 °C  "(97.7 °F), height 1.549 m (5' 1\"), weight 78.7 kg (173 lb 6.4 oz).    Result Date: 9/25/2023  Normal sinus rhythm Normal ECG When compared with ECG of 10-MAR-2022 13:39, Nonspecific T wave abnormality no longer evident in Inferior leads T wave inversion no longer evident in Lateral leads Confirmed by Kiet White ([id number]) on 9/25/2023 8:22:47 AM    XR chest 2 view    Result Date: 9/15/2023  Interpreted By:  REGINO WOODSON MD MRN: [id number] Patient Name: SABIHA TERRY  STUDY: TH CHEST 2 VIEW PA AND LAT;  9/14/2023 1:00 pm  INDICATION: XR    CHEST      L59.8.  COMPARISON: Chest radiograph 06/23/2022  ACCESSION NUMBER(S): [id number]  ORDERING CLINICIAN: ASHLEE SKINNER  FINDINGS:   CARDIOMEDIASTINAL SILHOUETTE: Cardiomediastinal silhouette is unchanged.  LUNGS: No consolidation, pneumothorax, or effusion.  ABDOMEN: No remarkable upper abdominal findings.  BONES: No acute osseous changes.  Remaining findings appear stable since prior comparison radiograph.      1.  No evidence of acute cardiopulmonary process.       Assessment/Plan   54 yo woman s/p right maxillectomy scapula free flap for [id number] scca, adjuvant chemorad with ORN of the mandible. We discussed that her CT in November demonstrated evidence of osteonecrosis of the mandible.  Is unclear the extent of the osteoradionecrosis.  We discussed that we would like to do would be taking her to the operating room with a plan for a debridement of the mandible and depending on the extent of the bone involved, a potential segmental mandibulectomy and reconstruction.  She was the opportunity to ask questions, which were answered to the best of our ability and she agrees with surgery  -We will give her a course of antibiotics to continue up until her surgery.  - We will plan for a tracheostomy, bilateral exploration of the neck, debridemnt of the the mandible with a possible segmental mandibulectomy, a possible left fibular free flap, possible ALT free flap, a possible skin " graft, a possible vein graft.  She already has a feeding tube issue so  she does not require any feeding tube tentatively for 2/7      Julio Carlisle MD

## 2024-01-16 ASSESSMENT — ENCOUNTER SYMPTOMS
ARTHRALGIAS: 0
SORE THROAT: 0
FEVER: 0
ABDOMINAL PAIN: 0
HEADACHES: 0
LEG SWELLING: 0
VOMITING: 0
SHORTNESS OF BREATH: 0
DIARRHEA: 0
TROUBLE SWALLOWING: 1
NUMBNESS: 1
NAUSEA: 0
CONSTIPATION: 0
CHILLS: 0
LIGHT-HEADEDNESS: 0
COUGH: 1

## 2024-01-16 NOTE — PROGRESS NOTES
Patient ID: Pamella Rose is a 53 y.o. female.  Diagnosis: Right maxillary sinus  squamous cell carcinoma  Staging: [id number]  Date of Diagnosis:    Providers:   ENT: Dr. Jay Delgado  MedOn: Dr. Kyunghee Burkitt / BABITA Hollins  RadOnc: Dr. Nadege Horton      Surgery:  2/25/22: Right maxillary biopsy showed poorly differentiated Squamous Cell Carcinoma with focal keratinization involving squmous mucosa.   3/20/22: Right Neck Exploration,  right scapular tip  free flap; maxilla reconstruction with placement of hardware   3/22/23: Right Mandibular Debridement w/ Dr. Delgado.    Prior Therapies:  5/11 - 6/22/22: Completed concurrent chemoXRT w/ 7 weeks of Carboplatin (2mg/AUC/dose) + Paclitaxel (45mg/m2) and 66gy VMAT in 30 fx.     Oncologic Issues  Osteoradionecrosis  Dysphagia  Mastication difficulties     ONCOLOGIC HISTORY  - 2/4/22: Pt initially presented to Dr. Delgado with swelling of the upper gum area that didn’t improve with abx. Referred by who Dentist did an in-office xray and was concerned about a mass in the sinus  - 2/4/22 FNA showed some atypical epithelioid cells, but no definitive diagnosis  - 2/18/22: CT neck with contrast showed a large destructive mass centered in the right maxillary sinus with marked osseous destruction and extension beyond the sinus walls into the nasal cavity, hard palate, and periantral soft tissues. There is also  likely subtle extension into the inferior right orbit and possibly involvement of the infraorbital canal concerning for perineural extension of neoplasm. There are several enlarged and heterogeneous lymph nodes bilaterally compatible with metastasis.  - 2/25/22 Right maxillary biopsy showed poorly differentiated Squamous Cell Carcinoma with focal keratinization involving squmous mucosa.   - 3/14/22: PET/CT showed subcentimeter RUL without significant metabolic activity, needs to be monitored with CT chest with contrast.  - 3/16/22 Surgery: Right hemimaxillectomy without  orbital exenteration, bilateral  NE (Right leverl 1-4, Left level 1-3); DL,  bronchoscopy, e sophagoscopy; Rigid Nasal Endoscopy with biopsy; Open tracheostomy with Dr. Delgado. Left scapular tip and latissimus muscle free flap , maxillary reconstruction with placement of hardware, Orbital floor reconstruction  with placement of hardware with Dr. Al.  - 3/16/22 Pathology revealed 4 LNs (with metastatic adenosquamous carcinoma) and 1 with thyroid carcinoma (subcapsular micrometastasis), level Right 1B, bilateral level 2/3 lymph nodes, with PAOLA.  Patient will need US thyroid in future and potential total thyroidectomy.  - 3/22/22 Surgery:  Right Neck Exploration, r ight scapular tip free flap; m axilla reconstruction with placement of hardware with Dr. Al   - 3/25/22 Tumor Board: Recently found to have right maxilla mass underwent surgical resection, findings c/w adeno-squamous carcinoma, also found to have  metastatic thyroid carcinoma in left level 2/3 lymph node. Rec: Adjuvant chemoradiation; total thyroidectomy  - 5/11/ - 6/22: Completed concurrent chemoXRT w/ 7 weeks of Carboplatin (2mg/AUC/dose) + Paclitaxel (45mg/m2) and 66gy VMAT in 30 fx.   - 3/22/23: Right Mandibular Debridement w/ Dr. Delgado.  - 7/10/23 FNA of a left cervical LN. Path showed polymorphous lymphoid tissue with no carcinoma identified.       Admission:  - 6/23 - 6/28/23: Admitted for Acute Otitis externa and febrile neutropenia. Received broad spectrum IV antibiotics. 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      Past Medical History:   Past Medical History:  No date: Anxiety  No date: CKD (chronic kidney disease)      Comment:  Stage 3  No date: Depression  No date: Dysphagia  09/25/2023: Encounter for electrocardiogram      Comment:  Normal sinus rhythm Normal ECG  No date: GERD (gastroesophageal reflux disease)  No date: History of sinus cancer      Comment:  Right  maxillary sinus  squamous cell carcinoma s/p                concurrent chemoXRT  : Hx of tracheostomy  No date: Hyperaldosteronism (CMS/HCC)  No date: Hyperlipidemia  No date: Hypertension  No date: Hypothyroidism  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  2. Normal global left ventricular systolic function.  3. Moderate to severe LVH and hypertensive response to exercise without any regional wall motion abnormalities at 61% MPHR.     SECTION, LOW TRANSVERSE      COLONOSCOPY      CT AORTA AND BILATERAL ILIOFEMORAL RUNOFF ANGIOGRAM W AND/OR WO IV CONTRAST  2023    CT AORTA AND BILATERAL ILIOFEMORAL RUNOFF ANGIOGRAM W AND/OR WO IV CONTRAST 2023 [facility] CT    ECHOCARDIOGRAM 2 D M MODE PANEL  2016    The left ventricular systolic function is normal with a 60-65% ejection fraction.  Spectral Doppler shows an impaired relaxation pattern of left ventricular diastolic filling. There is moderate to severe concentric left ventricular hypertrophy.    IR INTERVENTION VENOUS SAMPLING  2021    IR INTERVENTION VENOUS SAMPLING 2021 [facility]    IR INTERVENTION VENOUS SAMPLING  2021    IR INTERVENTION VENOUS SAMPLING 2021 [facility]    OTHER SURGICAL HISTORY      right maxillectomy scapula free flap    OTHER SURGICAL HISTORY      right mandibular debridement      Family History:    Family History   Problem Relation Name Age of Onset    Hypertension Mother      Other (CABG) Mother      Hypertension Father      Lung cancer Father      Colon cancer Other grandfather      Family  Oncology History:    Cancer-related family history includes Colon cancer in an other family member; Lung cancer in her father.  Social History:    Social History     Tobacco Use    Smoking status: Former     Packs/day: .25     Types: Cigarettes     Quit date: 2024     Years since quittin.1     Passive exposure: Never    Smokeless tobacco: Never   Vaping Use    Vaping Use: Never used   Substance Use Topics    Alcohol use: Never    Drug use: Not Currently     Types: Marijuana        Chief Complaint: Squamous Cell Carcinoma of Maxillary Sinus    HPI  Interval History  Pamella Rose is a 53 y.o. female with h/o tight maxillary sinus squamous cell carcinoma. [id number].  S/p right hemimaxillectomy, left scapula flap with revision and right scapula tip free flap, bilateral ND. From  - 22 she completed concurrent chemoXRT w/ 7 weeks of Carboplatin (2mg/AUC/dose) + Paclitaxel (45mg/m2) and 66gy VMAT  in 30 fx. 22 PET/CT showed good response, MAYELA per ENT exam.     Patient presents for 16 month follow up and reports the following:    She was admitted  - 24 for complications with osteoradionecrosis. She does not have pain in the jaw/mandible.   She does report tightness and swelling in the right side of face. Jaw fatigues with chewing. Right side of neck often tight and muscle spasms.  Using PEG for all nutrition. Drinking clear liquids by mouth. She continues to have moderate dysphagia, sometimes coughs after drinking water.   Reports weakness and pain in the right arm is largely unchanged. Has shooting pain from her right neck down to right arm. This is also unchanged. She's not able to lift her right arm past 120 degrees. She reports she did go to PT for the weakness in right arm but has not regain much strength  back.  Overall energy and stamina is low but stable.   Dr. Delgado is planning for tracheostomy, b/l exploration of neck, debriedement of the mandible with possible segmental  mandibulectomy, possible left fibular free flap, possible ALT free flap, a possible skin graft, a possible vein graft on 2/22/24.     ROS  Review of Systems   Constitutional:  Negative for chills and fever.   HENT:   Positive for hearing loss and trouble swallowing. Negative for lump/mass, mouth sores, nosebleeds and sore throat.    Respiratory:  Positive for cough. Negative for shortness of breath.    Cardiovascular:  Negative for chest pain and leg swelling.   Gastrointestinal:  Negative for abdominal pain, constipation, diarrhea, nausea and vomiting.   Musculoskeletal:  Negative for arthralgias.   Skin:  Negative for rash.   Neurological:  Positive for numbness. Negative for headaches and light-headedness.       Allergies  No Known Allergies     Medications  Current Outpatient Medications   Medication Instructions    acetaminophen (Tylenol) 160 mg/5 mL liquid 31.2 mL (1,000 mg) by g-tube route every 8 hours for 14 days.    amLODIPine (NORVASC) 10 mg, oral, Daily    chlorhexidine (Peridex) 0.12 % solution RINSE WITH 15 ML FOUR TIMES A DAY AFTER MEALS AND AT BEDTIME    cyclobenzaprine (FLEXERIL) 5 mg, oral, 3 times daily    famotidine (Pepcid) 20 mg tablet 1 tablet DAILY (route: oral)    FLUoxetine (PROZAC) 20 mg, oral, Daily    food supplemt, lactose-reduced (Boost High Protein) 0.06 gram- 1 kcal/mL liquid 1 Can, oral, Daily    gabapentin (NEURONTIN) 250 mg, oral, Daily    levothyroxine (LEVOXYL) 100 mcg, oral, Daily before breakfast, Must take on an empty stomach, 30min to 1 hour before breakfast or other meds.    lisinopril 40 mg, oral, Daily    metoprolol succinate XL (TOPROL-XL) 50 mg, oral, Daily, Do not crush or chew.    OLANZapine (ZyPREXA) 5 mg tablet TAKE 1 TABLET BY MOUTH ONCE DAILY AT BEDTIME    omeprazole (PRILOSEC) 20 mg, oral, Daily    penicillin v potassium (Veetid) 500 mg tablet oral    rosuvastatin (CRESTOR) 10 mg, oral, Daily    spironolactone (Aldactone) 25 mg tablet 1 tablet DAILY (route: oral)     terazosin (HYTRIN) 5 mg, oral, Nightly    white petrolatum 41 % ointment ointment 1 Application, Topical, Daily PRN,  APPLY SPARINGLY TO AFFECTED AREA(S) 3 TIMES A DAY        OBJECTIVE:    VS:  /68   Pulse 88   Temp 36.3 °C (97.3 °F) (Core)   Resp 20   Wt 79.6 kg (175 lb 7.8 oz)   SpO2 95%   BMI 33.16 kg/m²   Daily Weight  02/08/24 : 77.1 kg (170 lb)  02/08/24 : 79.6 kg (175 lb 7.8 oz)  01/12/24 : 78.7 kg (173 lb 6.4 oz)  11/24/23 : 77.2 kg (170 lb 1.6 oz)  11/05/23 : 80 kg (176 lb 4.8 oz)  10/18/23 : 80 kg (176 lb 4.8 oz)  10/09/23 : 79.8 kg (176 lb)      Physical Exam  Constitutional:       Appearance: Normal appearance. She is well-developed.   HENT:      Head: Normocephalic and atraumatic.      Comments: + facial swelling bilaterally cheek, R>L     Right Ear: External ear normal. No tenderness.      Left Ear: External ear normal. No tenderness.      Nose: Nose normal.      Mouth/Throat:      Mouth: Mucous membranes are moist. No injury or oral lesions.      Tongue: No lesions.      Pharynx: Oropharynx is clear.      Comments: Edentulous, trismus with limited oral opening  Eyes:      Extraocular Movements: Extraocular movements intact.      Conjunctiva/sclera: Conjunctivae normal.      Pupils: Pupils are equal, round, and reactive to light.   Neck:      Thyroid: No thyroid mass.        Comments: 1.5cm lesion with white mucoid and bloody drainage from right submental region.     Cardiovascular:      Rate and Rhythm: Normal rate and regular rhythm.   Pulmonary:      Effort: Pulmonary effort is normal. No respiratory distress.      Breath sounds: Normal breath sounds.   Abdominal:      General: Bowel sounds are normal. There is no distension or abdominal bruit.      Palpations: Abdomen is soft. There is no mass.      Tenderness: There is no abdominal tenderness.   Musculoskeletal:         General: Normal range of motion.      Cervical back: Normal range of motion and neck supple. No signs of trauma.  Normal range of motion.      Right lower leg: No edema.      Left lower leg: No edema.   Lymphadenopathy:      Cervical: No cervical adenopathy.      Upper Body:      Right upper body: No axillary adenopathy.      Left upper body: No axillary adenopathy.   Skin:     General: Skin is warm and dry.      Findings: No lesion or rash.   Neurological:      General: No focal deficit present.      Mental Status: She is alert and oriented to person, place, and time.      Gait: Gait is intact.   Psychiatric:         Mood and Affect: Mood and affect normal.         Behavior: Behavior is cooperative.         Diagnostic Results     Labs  Results from last 7 days   Lab Units 02/08/24  1311   WBC AUTO x10*3/uL 4.8   HEMOGLOBIN g/dL 12.9   HEMATOCRIT % 41.7   PLATELETS AUTO x10*3/uL 161   NEUTROS ABS x10*3/uL 2.92   LYMPHS ABS AUTO x10*3/uL 1.37   MONOS ABS AUTO x10*3/uL 0.26   EOS ABS AUTO x10*3/uL 0.19   NEUTROS PCT AUTO % 61.2   LYMPHS PCT AUTO % 28.7   MONOS PCT AUTO % 5.5   EOS PCT AUTO % 4.0      Results from last 7 days   Lab Units 02/08/24  1311   GLUCOSE mg/dL 210*   SODIUM mmol/L 140   POTASSIUM mmol/L 3.8   CHLORIDE mmol/L 105   CO2 mmol/L 26   BUN mg/dL 24*   CREATININE mg/dL 1.22*   EGFR mL/min/1.73m*2 53*   CALCIUM mg/dL 9.8   ALBUMIN g/dL 4.2   PROTEIN TOTAL g/dL 7.3   BILIRUBIN TOTAL mg/dL 0.4   ALK PHOS U/L 55   ALT U/L 8   AST U/L 10     Results from last 7 days   Lab Units 02/08/24  1311   TSH mIU/L 15.32*   FREE T4 ng/dL 1.01                      Images        Assessment/Plan     ASSESSMENT  Pamella Rose is a 53 y.o. female with h/o tight maxillary sinus squamous cell carcinoma. [id number].  S/p right hemimaxillectomy, left scapula flap with revision and right scapula tip free flap, bilateral ND. From 5/11/ - 6/22/22 she completed concurrent chemoXRT w/ 7 weeks of Carboplatin (2mg/AUC/dose) + Paclitaxel (45mg/m2) and 66gy VMAT  in 30 fx.      # Right maxillary sinus squamous cell carcinoma  - 3/16/22 Right  hemimaxillectomy, left scapula flap with revision and right scapula tip free flap, b/l ND with negative margins.   - Since surgery she has pain in right side of face, tingling and swelling in right cheek, swelling has improved this week. Following Supp Onc (Sarah NETTLES) for pain management.  - 6/22/22 Completed concurrent chemoXRT w/ 7 weeks of Carboplatin (2mg/AUC/dose) + Paclitaxel (45mg/m2) and 66gy VMAT in 30 fx  - Unfortunately was hospitalized 6/26 -6/28 for febrile neutropenia and otitis externa, d/c home on oral abx, and eye and ear drops.   - 11/18/22: post treatment PET/CT showed good response, increased metabolic activity along the right maxillary bone graft, likely reactive. Patient was seen by Dr. Delgado in [place], per  his exam, she has MAYELA.  - 3/16/23: ongoing lymphedema of right face/mandible and lateral nose with neuropathy and pressure pain.  Pain is managed by supp onc. She is scheduled to have procedure next week to  work on exposed bone around mandible.  - 3/22/23: s/p right mandibular debridement  - 4/26/23 MRI Orbits showed post op changes w/ interval decrease in the enhancement in the surgical bed and surround soft tissues.   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  - 7/10/23 FNA of left parotic nodule showed polymorphous lymphoid tissue w/ no carcinoma identified.   - 7/20/23: Patient is stable overall, has chronic SEs from chemoradiation including chronic facial and neck swelling/lymphedema, right shoulder contracture with decreases ROM, weakness and right arm neuropathy. Has chronic fatigue. Patient presented me with  Medical Source Statement to complete from her .   - 10/5/23: Patient with about 1.5 cm open wound under right mandible at junction with neck. Has been draining for 2 weeks, drainage is mucoid mixed with blood. No fevers/chills, no erythema. Unfortunately no labs today, however does not appear to be infected.  "She does have wound care appts set up but unclear if patient is going to these appts consistently as she cites issues with transportation.   - 2/9/24: Patient admitted for osteoradionecrosis a few months ago. She did not continue HPO therapy. Pt will be going for trach with b/l neck exploration, mandibular debridement and reconstruction with Dr. Delgado on 2/22. She does not currently have pain in the mandible. She does have muscle tightness in the right neck with spasms. Will trial Flexeril PRN.    # Thyroid carcinoma   - 3/16/22 Pathology showing 1 left level 2/3 LN with thyroid carcinoma (subcapsular micrometastasis)  - 6/24/22 CT Neck showed Similar appearance of the thyroid gland with sub-centimeter hypodensities in the right greater than left lobe.  - 11/18/22 restaging scan did not find anything remarkable in the thyroid  - Dr. Delgado is following this, per his 6/16 FUV \"since this was an incidental micromets, and there are no significant nodules after chemoradiation, would just observe with serial ultrasounds at this time; pt has significantly scarred neck and likely no residual  disease in the thyroid\"    # Hypothroidism  - 8/11/22 TSH 3.74  - 12/1/22: TSH 17.20  - 2/17/23: started synthroid 50mcg on 2/17/23 based on 12/1/22 TSH level.  - 4/13/23 TSH 14.91  - 6/22/23 TSH 29.7  - 7/20/23 TSH 32.9. Levothyroxine increased to 88mcg. Recheck TSH/T4 on 8/25  - 10/5/23: Patient reports she has not been taking levothyroxine. Patient did not go to lab today to have TSH redrawn. Levothyroxine 100mg was sent to [facility] pharmacy for patient to  today.   - 2/8/24: TSH 15.32, T4 1.01. Patient to increase Levothyroxine to 112mcg.     # Right eye ptosis/epiphora/possible conjunctivitis  - Right eye ptosis and epiphora, likely related to prior surgery/radiation. No vision change/eye pain  - redness/itchiness in right eye for past few weeks, continue treat her for potential conjunctivitis with Floxin otic drop     # Facial " lymphedema  - Right side of face and neck. Swelling fluctuates. Start twice a week lymphedema therapy on 10/31/22. Has had good improvement on the swelling though still has lymphedema at baseline.  - Advised that she continue to do daily lymphedema exercises      # Right ear hearing loss  - Patient c/o subjective hearing loss in right ear, ear still feels full, sounds muffled 3 months after treatment  - Was referred to Audiology and had audiogram scheduled 11/11/22, however patient missed this appointment.   - 10/5/23: Patient again endorsed worsening hearing. Referral made again for Audiology and patient is agreeable.       # CKD, stage 3  - Following with Dr. Ana Lee  - Today Cr 1.58, elevated from baseline, GFR 39 also decreased. Advised patient to keep up with hydration via PEG and PO.     # Depression  - Following Sushil Bryan ([facility]), was started on Duloxetine 20mg and Olanzapine 2.5mg     # Hypertension  - 148/97, HR 73. Patient has PCP at [facility], reports she's on Lisinopril, Amlodipine, Spironolactone, Metoprolol, and is on a statin. She reports missing some medications here and there. Advised patient to taking her BP meds daily.   - BP wnl in clinic today    # Eval for Medical Source Statement  - Paper work for eval of ability to return to work was completed today. To be mailed to patients  by ENT nurse Georgina Hannah.        PLAN:  -- 2/22/24 Surgery with Dr. Delgado. TANVI per ENT  -- RTC 2 month with [facility] on 4/16/23, labs prior, cbc, cmp, tsh/t4  -- INCREASE Levothyroxine  to 112mcg. RX sent to [facility]  -- START Flexeril 5mg Q8hr PRN for right neck muscle tightness and spasm

## 2024-01-26 ENCOUNTER — APPOINTMENT (OUTPATIENT)
Dept: PREADMISSION TESTING | Facility: HOSPITAL | Age: 54
End: 2024-01-26
Payer: COMMERCIAL

## 2024-01-30 ENCOUNTER — HOSPITAL ENCOUNTER (OUTPATIENT)
Dept: RADIOLOGY | Facility: HOSPITAL | Age: 54
Discharge: HOME | End: 2024-01-30
Payer: COMMERCIAL

## 2024-01-30 DIAGNOSIS — C41.1 CANCER OF INFERIOR MAXILLA (MULTI): ICD-10-CM

## 2024-01-30 PROCEDURE — 70486 CT MAXILLOFACIAL W/O DYE: CPT | Performed by: RADIOLOGY

## 2024-01-30 PROCEDURE — 70486 CT MAXILLOFACIAL W/O DYE: CPT

## 2024-02-08 ENCOUNTER — OFFICE VISIT (OUTPATIENT)
Dept: HEMATOLOGY/ONCOLOGY | Facility: HOSPITAL | Age: 54
End: 2024-02-08
Payer: COMMERCIAL

## 2024-02-08 ENCOUNTER — LAB (OUTPATIENT)
Dept: LAB | Facility: HOSPITAL | Age: 54
End: 2024-02-08
Payer: COMMERCIAL

## 2024-02-08 ENCOUNTER — PHARMACY VISIT (OUTPATIENT)
Dept: PHARMACY | Facility: CLINIC | Age: 54
End: 2024-02-08
Payer: MEDICAID

## 2024-02-08 ENCOUNTER — PRE-ADMISSION TESTING (OUTPATIENT)
Dept: PREADMISSION TESTING | Facility: HOSPITAL | Age: 54
End: 2024-02-08
Payer: COMMERCIAL

## 2024-02-08 VITALS
WEIGHT: 175.49 LBS | TEMPERATURE: 97.3 F | BODY MASS INDEX: 33.16 KG/M2 | SYSTOLIC BLOOD PRESSURE: 125 MMHG | HEART RATE: 88 BPM | RESPIRATION RATE: 20 BRPM | OXYGEN SATURATION: 95 % | DIASTOLIC BLOOD PRESSURE: 68 MMHG

## 2024-02-08 VITALS
WEIGHT: 170 LBS | TEMPERATURE: 98.4 F | SYSTOLIC BLOOD PRESSURE: 112 MMHG | BODY MASS INDEX: 32.1 KG/M2 | RESPIRATION RATE: 20 BRPM | HEIGHT: 61 IN | HEART RATE: 82 BPM | DIASTOLIC BLOOD PRESSURE: 70 MMHG

## 2024-02-08 DIAGNOSIS — E03.9 HYPOTHYROIDISM, UNSPECIFIED TYPE: ICD-10-CM
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
## 2024-02-08 DIAGNOSIS — M27.2 OSTEORADIONECROSIS OF MANDIBLE: ICD-10-CM

## 2024-02-08 DIAGNOSIS — Z08 ENCOUNTER FOR FOLLOW-UP SURVEILLANCE OF HEAD AND NECK CANCER: ICD-10-CM

## 2024-02-08 DIAGNOSIS — M27.2 OSTEORADIONECROSIS OF JAW: Primary | ICD-10-CM

## 2024-02-08 DIAGNOSIS — Z85.89 ENCOUNTER FOR FOLLOW-UP SURVEILLANCE OF HEAD AND NECK CANCER: ICD-10-CM

## 2024-02-08 DIAGNOSIS — C41.1 CANCER OF INFERIOR MAXILLA (MULTI): Primary | ICD-10-CM

## 2024-02-08 DIAGNOSIS — C41.1 CANCER OF INFERIOR MAXILLA (MULTI): ICD-10-CM

## 2024-02-08 DIAGNOSIS — M62.838 MUSCLE SPASM: ICD-10-CM

## 2024-02-08 LAB
ABO GROUP (TYPE) IN BLOOD: NORMAL
ALBUMIN SERPL BCP-MCNC: 4.2 G/DL (ref 3.4–5)
ALP SERPL-CCNC: 55 U/L (ref 33–110)
ALT SERPL W P-5'-P-CCNC: 8 U/L (ref 7–45)
ANION GAP SERPL CALC-SCNC: 13 MMOL/L (ref 10–20)
ANTIBODY SCREEN: NORMAL
AST SERPL W P-5'-P-CCNC: 10 U/L (ref 9–39)
BASOPHILS # BLD AUTO: 0.02 X10*3/UL (ref 0–0.1)
BASOPHILS NFR BLD AUTO: 0.4 %
BILIRUB SERPL-MCNC: 0.4 MG/DL (ref 0–1.2)
BUN SERPL-MCNC: 24 MG/DL (ref 6–23)
CALCIUM SERPL-MCNC: 9.8 MG/DL (ref 8.6–10.3)
CHLORIDE SERPL-SCNC: 105 MMOL/L (ref 98–107)
CO2 SERPL-SCNC: 26 MMOL/L (ref 21–32)
CREAT SERPL-MCNC: 1.22 MG/DL (ref 0.5–1.05)
EGFRCR SERPLBLD CKD-EPI 2021: 53 ML/MIN/1.73M*2
EOSINOPHIL # BLD AUTO: 0.19 X10*3/UL (ref 0–0.7)
EOSINOPHIL NFR BLD AUTO: 4 %
ERYTHROCYTE [DISTWIDTH] IN BLOOD BY AUTOMATED COUNT: 16.3 % (ref 11.5–14.5)
GLUCOSE SERPL-MCNC: 210 MG/DL (ref 74–99)
HCT VFR BLD AUTO: 41.7 % (ref 36–46)
HGB BLD-MCNC: 12.9 G/DL (ref 12–16)
IMM GRANULOCYTES # BLD AUTO: 0.01 X10*3/UL (ref 0–0.7)
IMM GRANULOCYTES NFR BLD AUTO: 0.2 % (ref 0–0.9)
LYMPHOCYTES # BLD AUTO: 1.37 X10*3/UL (ref 1.2–4.8)
LYMPHOCYTES NFR BLD AUTO: 28.7 %
MCH RBC QN AUTO: 28.9 PG (ref 26–34)
MCHC RBC AUTO-ENTMCNC: 30.9 G/DL (ref 32–36)
MCV RBC AUTO: 94 FL (ref 80–100)
MONOCYTES # BLD AUTO: 0.26 X10*3/UL (ref 0.1–1)
MONOCYTES NFR BLD AUTO: 5.5 %
NEUTROPHILS # BLD AUTO: 2.92 X10*3/UL (ref 1.2–7.7)
NEUTROPHILS NFR BLD AUTO: 61.2 %
NRBC BLD-RTO: 0 /100 WBCS (ref 0–0)
PLATELET # BLD AUTO: 161 X10*3/UL (ref 150–450)
POTASSIUM SERPL-SCNC: 3.8 MMOL/L (ref 3.5–5.3)
PROT SERPL-MCNC: 7.3 G/DL (ref 6.4–8.2)
RBC # BLD AUTO: 4.46 X10*6/UL (ref 4–5.2)
RH FACTOR (ANTIGEN D): NORMAL
SODIUM SERPL-SCNC: 140 MMOL/L (ref 136–145)
T4 FREE SERPL-MCNC: 1.01 NG/DL (ref 0.78–1.48)
TSH SERPL-ACNC: 15.32 MIU/L (ref 0.44–3.98)
WBC # BLD AUTO: 4.8 X10*3/UL (ref 4.4–11.3)

## 2024-02-08 PROCEDURE — 99204 OFFICE O/P NEW MOD 45 MIN: CPT | Performed by: NURSE PRACTITIONER

## 2024-02-08 PROCEDURE — 84439 ASSAY OF FREE THYROXINE: CPT

## 2024-02-08 PROCEDURE — RXMED WILLOW AMBULATORY MEDICATION CHARGE

## 2024-02-08 PROCEDURE — 99215 OFFICE O/P EST HI 40 MIN: CPT | Performed by: STUDENT IN AN ORGANIZED HEALTH CARE EDUCATION/TRAINING PROGRAM

## 2024-02-08 PROCEDURE — 84443 ASSAY THYROID STIM HORMONE: CPT

## 2024-02-08 PROCEDURE — 87081 CULTURE SCREEN ONLY: CPT

## 2024-02-08 PROCEDURE — 3074F SYST BP LT 130 MM HG: CPT | Performed by: STUDENT IN AN ORGANIZED HEALTH CARE EDUCATION/TRAINING PROGRAM

## 2024-02-08 PROCEDURE — 86901 BLOOD TYPING SEROLOGIC RH(D): CPT

## 2024-02-08 PROCEDURE — 36415 COLL VENOUS BLD VENIPUNCTURE: CPT

## 2024-02-08 PROCEDURE — 3078F DIAST BP <80 MM HG: CPT | Performed by: STUDENT IN AN ORGANIZED HEALTH CARE EDUCATION/TRAINING PROGRAM

## 2024-02-08 PROCEDURE — 80053 COMPREHEN METABOLIC PANEL: CPT

## 2024-02-08 PROCEDURE — 85025 COMPLETE CBC W/AUTO DIFF WBC: CPT

## 2024-02-08 PROCEDURE — 1036F TOBACCO NON-USER: CPT | Performed by: STUDENT IN AN ORGANIZED HEALTH CARE EDUCATION/TRAINING PROGRAM

## 2024-02-08 RX ORDER — CHLORHEXIDINE GLUCONATE ORAL RINSE 1.2 MG/ML
SOLUTION DENTAL
Qty: 473 ML | Refills: 6 | Status: ON HOLD | OUTPATIENT
Start: 2024-02-08 | End: 2024-02-22 | Stop reason: WASHOUT

## 2024-02-08 RX ORDER — CYCLOBENZAPRINE HCL 5 MG
5 TABLET ORAL 3 TIMES DAILY
Qty: 30 TABLET | Refills: 2 | Status: ON HOLD | OUTPATIENT
Start: 2024-02-08 | End: 2024-02-22 | Stop reason: SDUPTHER

## 2024-02-08 ASSESSMENT — CHADS2 SCORE
DIABETES: NO
PRIOR STROKE OR TIA OR THROMBOEMBOLISM: NO
CHF: NO
CHADS2 SCORE: 0
HYPERTENSION: NO
AGE GREATER THAN OR EQUAL TO 75: NO

## 2024-02-08 ASSESSMENT — ENCOUNTER SYMPTOMS
NEUROLOGICAL NEGATIVE: 1
NECK STIFFNESS: 1
CONSTITUTIONAL NEGATIVE: 1
ENDOCRINE NEGATIVE: 1
GASTROINTESTINAL NEGATIVE: 1
CARDIOVASCULAR NEGATIVE: 1
TROUBLE SWALLOWING: 1
RESPIRATORY NEGATIVE: 1

## 2024-02-08 ASSESSMENT — DUKE ACTIVITY SCORE INDEX (DASI)
CAN YOU RUN A SHORT DISTANCE: YES
CAN YOU DO HEAVY WORK AROUND THE HOUSE LIKE SCRUBBING FLOORS OR LIFTING AND MOVING HEAVY FURNITURE: NO
CAN YOU WALK INDOORS, SUCH AS AROUND YOUR HOUSE: YES
CAN YOU HAVE SEXUAL RELATIONS: YES
DASI METS SCORE: 6.7
CAN YOU DO MODERATE WORK AROUND THE HOUSE LIKE VACUUMING, SWEEPING FLOORS OR CARRYING GROCERIES: YES
CAN YOU TAKE CARE OF YOURSELF (EAT, DRESS, BATHE, OR USE TOILET): YES
CAN YOU DO YARD WORK LIKE RAKING LEAVES, WEEDING OR PUSHING A MOWER: NO
CAN YOU DO LIGHT WORK AROUND THE HOUSE LIKE DUSTING OR WASHING DISHES: YES
TOTAL_SCORE: 32.2
CAN YOU PARTICIPATE IN MODERATE RECREATIONAL ACTIVITIES LIKE GOLF, BOWLING, DANCING, DOUBLES TENNIS OR THROWING A BASEBALL OR FOOTBALL: NO
CAN YOU PARTICIPATE IN STRENOUS SPORTS LIKE SWIMMING, SINGLES TENNIS, FOOTBALL, BASKETBALL, OR SKIING: NO
CAN YOU WALK A BLOCK OR TWO ON LEVEL GROUND: YES
CAN YOU CLIMB A FLIGHT OF STAIRS OR WALK UP A HILL: YES

## 2024-02-08 ASSESSMENT — LIFESTYLE VARIABLES: SMOKING_STATUS: NONSMOKER

## 2024-02-08 ASSESSMENT — PAIN SCALES - GENERAL: PAINLEVEL: 0-NO PAIN

## 2024-02-08 NOTE — H&P (VIEW-ONLY)
CPM/PAT Evaluation       Name: Pamella Rose (Pamella Rose)  /Age: 1970/53 y.o.     Visit Type:   In-Person       Chief Complaint: Osteoradionecrosis of jaw    HPI  Patient is a 53-year-old female with a past medical history significant for right maxillary cancer status post resection with scapular free flap and chemoradiation. Pt has osteoradionecrosis of the jaw and is being evaluated in CPM in anticipation of tracheostomy, exploration of vessels of the head neck, mandible osteotomy, explained excision and split thickness skin graft and osteocutaneous lower extremity flap with Dr. Delgado on 2024.  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  2. Normal global left ventricular systolic function.  3. Moderate to severe LVH and hypertensive response to exercise without any regional wall motion abnormalities at 61% MPHR.     SECTION, LOW TRANSVERSE      COLONOSCOPY      CT AORTA AND BILATERAL ILIOFEMORAL RUNOFF ANGIOGRAM W AND/OR WO IV CONTRAST  2023    CT AORTA AND BILATERAL ILIOFEMORAL RUNOFF ANGIOGRAM W AND/OR WO IV CONTRAST 2023 [facility]    ECHOCARDIOGRAM 2 D M MODE PANEL  2016    The  left ventricular systolic function is normal with a 60-65% ejection fraction.  Spectral Doppler shows an impaired relaxation pattern of left ventricular diastolic filling. There is moderate to severe concentric left ventricular hypertrophy.    IR INTERVENTION VENOUS SAMPLING  08/11/2021    IR INTERVENTION VENOUS SAMPLING [place]20[place] [facility]    IR INTERVENTION VENOUS SAMPLING  09/14/2021    IR INTERVENTION VENOUS SAMPLING [place]20[place] [facility]    OTHER SURGICAL HISTORY      right maxillectomy scapula free flap    OTHER SURGICAL HISTORY      right mandibular debridement       Patient Sexual activity questions deferred to the physician.    Family History   Problem Relation Name Age of Onset    Hypertension Mother      Other (CABG) Mother      Hypertension Father      Lung cancer Father      Colon cancer Other grandfather        No Known Allergies    Prior to Admission medications    Medication Sig Start Date End Date Taking? Authorizing Provider   acetaminophen (Tylenol) 160 mg/5 mL liquid 31.2 mL (1,000 mg) by g-tube route every 8 hours for 14 days. 11/5/23  Yes Deena Nunez MD   amLODIPine (Norvasc) 10 mg tablet Take 1 tablet (10 mg) by mouth once daily. Do not start before November 6, 2023. 11/6/23  Yes Deena Nunez MD   chlorhexidine (Peridex) 0.12 % solution RINSE WITH 15 ML FOUR TIMES A DAY AFTER MEALS AND AT BEDTIME 2/8/24 2/7/25 Yes Rohit Boles PA-C   cyclobenzaprine (Flexeril) 5 mg tablet Take 1 tablet (5 mg) by mouth 3 times a day. 2/8/24 3/9/24 Yes Rohit Boles PA-C   levothyroxine (LevoxyL) 100 mcg tablet Take 1 tablet (100 mcg) by mouth once daily in the morning. Take before meals. Must take on an empty stomach, 30min to 1 hour before breakfast or other meds. 12/22/23  Yes Rohit Boles PA-C   lisinopril 40 mg tablet Take 1 tablet (40 mg) by mouth once daily. Do not start before November 7, 2023. 11/7/23  Yes Deena Nunez MD   metoprolol succinate XL (Toprol-XL) 50 mg 24 hr tablet Take 1  tablet (50 mg) by mouth once daily. Do not crush or chew. 11/5/23  Yes Deena Nunez MD   OLANZapine (ZyPREXA) 5 mg tablet TAKE 1 TABLET BY MOUTH ONCE DAILY AT BEDTIME 11/5/23  Yes Deena Nunez MD   omeprazole (PriLOSEC) 20 mg DR capsule Take 1 capsule (20 mg) by mouth once daily. 1/12/24 1/11/25 Yes Julio Carlisle MD   penicillin v potassium (Veetid) 500 mg tablet Take by mouth. 1/26/22  Yes Rufus Sheridan MD   rosuvastatin (Crestor) 10 mg tablet Take 1 tablet (10 mg) by mouth once daily. 11/5/23  Yes Deena Nunez MD   spironolactone (Aldactone) 25 mg tablet 1 tablet DAILY (route: oral) 11/5/23  Yes Deena Nunez MD   terazosin (Hytrin) 5 mg capsule Take 1 capsule (5 mg) by mouth once daily at bedtime. 11/5/23  Yes Deena Nunez MD   white petrolatum 41 % ointment ointment Apply 1 Application topically once daily as needed (skin care).  APPLY SPARINGLY TO AFFECTED AREA(S) 3 TIMES A DAY 11/5/23  Yes Deena Nunez MD   omeprazole (PriLOSEC) 20 mg DR capsule Take 2 capsules (40 mg) by mouth once daily. 11/5/23 2/8/24 Yes Deena Nunez MD   amoxicillin-pot clavulanate (Augmentin) 875-125 mg tablet Take 1 tablet (875 mg) by mouth 2 times a day for 26 days. 1/12/24 2/7/24  Julio Carlisle MD   famotidine (Pepcid) 20 mg tablet 1 tablet DAILY (route: oral)  Patient not taking: Reported on 2/8/2024 11/5/23   Deena Nunez MD   FLUoxetine (PROzac) 20 mg tablet Take 1 tablet (20 mg) by mouth once daily.  Patient not taking: Reported on 2/8/2024 11/5/23   Deena Nunez MD   food supplemt, lactose-reduced (Boost High Protein) 0.06 gram- 1 kcal/mL liquid Take 1 Can by mouth once daily.  Patient not taking: Reported on 2/8/2024 11/5/23   Deena Nunez MD   gabapentin 250 mg/5 mL solution Take 5 mL (250 mg) by mouth once daily.  Patient not taking: Reported on 2/8/2024 11/5/23   Deena Nunez MD   chlorhexidine (Peridex) 0.12 % solution RINSE WITH 15 ML FOUR TIMES A DAY AFTER MEALS AND AT BEDTIME  Patient not  taking: Reported on 2/8/2024 3/22/23 2/8/24  Keyona Tran MD   hydroCHLOROthiazide (HYDRODiuril) 25 mg tablet Take 1 tablet (25 mg) by mouth once daily.  Patient not taking: Reported on 2/8/2024 11/5/23 2/8/24  Deena Nunez MD   methocarbamol (Robaxin) 750 mg tablet Take by mouth. 2/22/21 2/8/24  Historical Provider, MD   nitroglycerin (Nitrostat) 0.4 mg SL tablet Place 1 tablet (0.4 mg) under the tongue every 5 minutes if needed for chest pain.  PLACE 1 TABLET UNDER THE TONGUE EVERY 5 MINUTES FOR UP TO 3 DOSES AS NEEDED FOR CHEST PAIN.CALL 911 IF PAIN PERSISTS.  Patient not taking: Reported on 2/8/2024 11/5/23 2/8/24  Deena Nunez MD   pentoxifylline (Trental) 400 mg ER tablet TAKE 1 TABLET 3 TIMES DAILY.  Patient not taking: Reported on 1/19/2024 11/5/23 2/8/24  Deena Nunez MD   SUMAtriptan (Imitrex) 50 mg tablet Take 1 tablet (50 mg) by mouth if needed for migraine.  Patient not taking: Reported on 1/19/2024 11/5/23 2/8/24  Deena Nunez MD        PAT ROS:   Constitutional:   neg    Neuro/Psych:   neg    Eyes:    visual disturbance  Ears:    tinnitus  Nose:   neg    Mouth:    mouth pain  Throat:    dysphagia  Neck:    stiffness   neck stiffness  Cardio:   neg    Respiratory:   neg    Endocrine:   neg    GI:   neg    :   neg    Musculoskeletal:   Hematologic:   neg    Skin:  neg        Physical Exam  Constitutional:       Appearance: Normal appearance.   HENT:      Head: Normocephalic and atraumatic.      Nose: Nose normal.   Eyes:      General:         Right eye: Discharge present.      Comments: Right eye reddened   Neck:      Comments: stiffness  Cardiovascular:      Rate and Rhythm: Normal rate and regular rhythm.      Pulses: Normal pulses.      Heart sounds: Normal heart sounds.   Pulmonary:      Breath sounds: No wheezing.   Abdominal:      Palpations: Abdomen is soft.   Musculoskeletal:         General: Normal range of motion.   Skin:     General: Skin is warm.   Neurological:      General: No  focal deficit present.      Mental Status: She is alert and oriented to person, place, and time.   Psychiatric:         Mood and Affect: Mood normal.         Behavior: Behavior normal.          PAT AIRWAY:   Airway:     Mallampati::  Unable to assess   edentulous      Visit Vitals  /70   Pulse 82   Temp 36.9 °C (98.4 °F)   Resp 20       DASI Risk Score      Flowsheet Row Most Recent Value   DASI SCORE 32.2   METS Score (Will be calculated only when all the questions are answered) 6.7          Caprini DVT Assessment      Flowsheet Row Most Recent Value   DVT Score 12   Current Status Major surgery planned, lasting over 3 hours   History Previous malignancy, Prior major surgery   Age 40-59 years   BMI 31-40 (Obesity)          Modified Frailty Index      Flowsheet Row Most Recent Value   Modified Frailty Index Calculator .0909          CHADS2 Stroke Risk  Current as of 17 minutes ago        N/A 3 - 100%: High Risk   2 - 3%: Medium Risk   0 - 2%: Low Risk     Last Change: N/A          This score determines the patient's risk of having a stroke if the patient has atrial fibrillation.        This score is not applicable to this patient. Components are not calculated.          Revised Cardiac Risk Index      Flowsheet Row Most Recent Value   Revised Cardiac Risk Calculator 0          Apfel Simplified Score      Flowsheet Row Most Recent Value   Apfel Simplified Score Calculator 3          Risk Analysis Index Results This Encounter    No data found in the last 1 encounters.       Stop Bang Score      Flowsheet Row Most Recent Value   Do you snore loudly? 1   Do you often feel tired or fatigued after your sleep? 0   Has anyone ever observed you stop breathing in your sleep? 0   Do you have or are you being treated for high blood pressure? 1   Recent BMI (Calculated) 32.1   Is BMI greater than 35 kg/m2? 0=No   Age older than 50 years old? 1=Yes   Is your neck circumference greater than 17 inches (Male) or 16 inches  (Female)? 0   Gender - Male 0=No   STOP-BANG Total Score 3            Assessment and Plan:     Anesthesia:  The patient denies problems with anesthesia in the past such as PONV, prolonged sedation, awareness, dental damage, aspiration, cardiac arrest, difficult intubation, or unexpected hospital admissions.     Neuro:   The patient has no neurological diagnoses or significant findings on chart review, clinical presentation, and evaluation.  No grossly apparent perioperative risk.    HEENT/Airway  The patient has diagnoses, significant findings on chart review, clinical presentation or evaluation of small mouth opening, prior radiation, face, neck.    Cardiovascular  The patient is scheduled for non-cardiac surgery associated with elevated risk.  The patient has no major cardiac contraindications to non- cardiac surgery.  RCRI  The patient meets 0-1 RCRI criteria and therefore has a less than 1% risk of major adverse cardiac complications.  METS  The patient's functional capacity capacity is greater than 4 METS.  EKG  The patient has no EKG or echocardiographic changes concerning for myocardial ischemia.   Heart Failure  The patient has no known history of heart failure.  Additionally, the patient reports no symptoms of heart failure and demonstrates no signs of heart failure.  Hypertension Evaluation  The patient has a known history of hypertension that is controlled.  Patient's hypertension is most consistent with stage 1.  Heart Rhythm Evaluation  The patient has no history of arrhythmias.  Heart Valve Evaluation  The patient has no known history of valvular heart disease. The patient has no symptoms or physical exam findings to suggest valvular heart disease.  CARDS EVAL  The patient is not followed by cardiology.  The patient has a 30-day risk for MACE of 0 predictors, 3.9% risk for cardiac death, nonfatal myocardial infarction, and nonfatal cardiac arrest.  SELINA score which indicates a 0.5% risk of  intraoperative or 30-day postoperative.    Pulmonary   No significant findings on chart review or clinical presentation and evaluation. The patient is at increased risk of perioperative pulmonary complications secondary to neck surgery.  The patient has a stop bang score of 3, which places patient at low risk for having JARVIS.    ARISCAT 26, Intermediate, 13.3% risk of in-hospital postoperative pulmonary complications  PRODIGY 0, low risk of respiratory depression episode.    Hematology  The patient has diagnoses or significant findings on chart review or clinical presentation and evaluation significant for maxillary cancer, thyroid cancer.  Antiplatelet management   The patient is not currently receiving antiplatelet therapy.  Anticoagulation management  The patient is not currently receiving anticoagulation therapy.  Caprini score 12, high risk of perioperative VTE  Transfusion Evaluation  A type and screen was obtained given the likelihood for perioperative transfusion of blood or blood products.    Gastrointestinal  The patient has diagnoses or significant findings on chart review or clinical presentation and evaluation significant for GERD, Dysphagia.  Eat 10- 19,  self-perceived oropharyngeal dysphagia scale (0-40)     Genitourinary  No diagnoses or significant findings on chart review or clinical presentation and evaluation.    Renal  The patient has a history of chronic kidney disease most consistent with stage 3.  Patient's renal function appears unchanged when compared to prior labs.    Musculoskeletal  No diagnoses or significant findings on chart review or clinical presentation and evaluation.    Endocrine  Diabetes Evaluation  The patient has no history of diabetes mellitus  Thyroid Disease Evaluation  The patient has a history of thyroid disease that appears controlled.  ID  No diagnoses or significant findings on chart review or clinical presentation and evaluation., MRSA screening obtained.  Prescriptions given for Hibiclens and Peridex.    -Preoperative medication instructions were provided and reviewed with the patient.  Any additional testing or evaluation was explained to the patient.  NPO Instructions were discussed, and the patient's questions were answered prior to conclusion of this encounter     Dr. Delgado notified of abnormal lab results.  Recent Results (from the past 336 hour(s))   CBC and Auto Differential    Collection Time: 02/08/24  1:11 PM   Result Value Ref Range    WBC 4.8 4.4 - 11.3 x10*3/uL    nRBC 0.0 0.0 - 0.0 /100 WBCs    RBC 4.46 4.00 - 5.20 x10*6/uL    Hemoglobin 12.9 12.0 - 16.0 g/dL    Hematocrit 41.7 36.0 - 46.0 %    MCV 94 80 - 100 fL    MCH 28.9 26.0 - 34.0 pg    MCHC 30.9 (L) 32.0 - 36.0 g/dL    RDW 16.3 (H) 11.5 - 14.5 %    Platelets 161 150 - 450 x10*3/uL    Neutrophils % 61.2 40.0 - 80.0 %    Immature Granulocytes %, Automated 0.2 0.0 - 0.9 %    Lymphocytes % 28.7 13.0 - 44.0 %    Monocytes % 5.5 2.0 - 10.0 %    Eosinophils % 4.0 0.0 - 6.0 %    Basophils % 0.4 0.0 - 2.0 %    Neutrophils Absolute 2.92 1.20 - 7.70 x10*3/uL    Immature Granulocytes Absolute, Automated 0.01 0.00 - 0.70 x10*3/uL    Lymphocytes Absolute 1.37 1.20 - 4.80 x10*3/uL    Monocytes Absolute 0.26 0.10 - 1.00 x10*3/uL    Eosinophils Absolute 0.19 0.00 - 0.70 x10*3/uL    Basophils Absolute 0.02 0.00 - 0.10 x10*3/uL   Comprehensive Metabolic Panel    Collection Time: 02/08/24  1:11 PM   Result Value Ref Range    Glucose 210 (H) 74 - 99 mg/dL    Sodium 140 136 - 145 mmol/L    Potassium 3.8 3.5 - 5.3 mmol/L    Chloride 105 98 - 107 mmol/L    Bicarbonate 26 21 - 32 mmol/L    Anion Gap 13 10 - 20 mmol/L    Urea Nitrogen 24 (H) 6 - 23 mg/dL    Creatinine 1.22 (H) 0.50 - 1.05 mg/dL    eGFR 53 (L) >60 mL/min/1.73m*2    Calcium 9.8 8.6 - 10.3 mg/dL    Albumin 4.2 3.4 - 5.0 g/dL    Alkaline Phosphatase 55 33 - 110 U/L    Total Protein 7.3 6.4 - 8.2 g/dL    AST 10 9 - 39 U/L    Bilirubin, Total 0.4 0.0 - 1.2 mg/dL     ALT 8 7 - 45 U/L   TSH with reflex to Free T4 if abnormal    Collection Time: 02/08/24  1:11 PM   Result Value Ref Range    Thyroid Stimulating Hormone 15.32 (H) 0.44 - 3.98 mIU/L   Thyroxine, Free    Collection Time: 02/08/24  1:11 PM   Result Value Ref Range    Thyroxine, Free 1.01 0.78 - 1.48 ng/dL   Staphylococcus aureus/MRSA colonization, Culture    Collection Time: 02/08/24  2:42 PM    Specimen: Nares/Axilla/Groin; Swab   Result Value Ref Range    Staph/MRSA Screen Culture No Staphylococcus aureus isolated    Type And Screen    Collection Time: 02/08/24  2:42 PM   Result Value Ref Range    ABO TYPE A     Rh TYPE POS     ANTIBODY SCREEN NEG

## 2024-02-08 NOTE — CPM/PAT H&P
CPM/PAT Evaluation       Name: Pamella Rose (Pamella Rose)  /Age: 1970/53 y.o.     Visit Type:   In-Person       Chief Complaint: Osteoradionecrosis of jaw    HPI  Patient is a 53-year-old female with a past medical history significant for right maxillary cancer status post resection with scapular free flap and chemoradiation. Pt has osteoradionecrosis of the jaw and is being evaluated in CPM in anticipation of tracheostomy, exploration of vessels of the head neck, mandible osteotomy, explained excision and split thickness skin graft and osteocutaneous lower extremity flap with Dr. Delgado on 2024.  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  2. Normal global left ventricular systolic function.  3. Moderate to severe LVH and hypertensive response to exercise without any regional wall motion abnormalities at 61% MPHR.     SECTION, LOW TRANSVERSE      COLONOSCOPY      CT AORTA AND BILATERAL ILIOFEMORAL RUNOFF ANGIOGRAM W AND/OR WO IV CONTRAST  2023    CT AORTA AND BILATERAL ILIOFEMORAL RUNOFF ANGIOGRAM W AND/OR WO IV CONTRAST 2023 [facility] CT    ECHOCARDIOGRAM 2 D M MODE PANEL  2016    The  left ventricular systolic function is normal with a 60-65% ejection fraction.  Spectral Doppler shows an impaired relaxation pattern of left ventricular diastolic filling. There is moderate to severe concentric left ventricular hypertrophy.    IR INTERVENTION VENOUS SAMPLING  08/11/2021    IR INTERVENTION VENOUS SAMPLING 8/11/2021 [facility]    IR INTERVENTION VENOUS SAMPLING  09/14/2021    IR INTERVENTION VENOUS SAMPLING 9/14/2021 [facility]    OTHER SURGICAL HISTORY      right maxillectomy scapula free flap    OTHER SURGICAL HISTORY      right mandibular debridement       Patient Sexual activity questions deferred to the physician.    Family History   Problem Relation Name Age of Onset    Hypertension Mother      Other (CABG) Mother      Hypertension Father      Lung cancer Father      Colon cancer Other grandfather        No Known Allergies    Prior to Admission medications    Medication Sig Start Date End Date Taking? Authorizing Provider   acetaminophen (Tylenol) 160 mg/5 mL liquid 31.2 mL (1,000 mg) by g-tube route every 8 hours for 14 days. 11/5/23  Yes Deena Nunez MD   amLODIPine (Norvasc) 10 mg tablet Take 1 tablet (10 mg) by mouth once daily. Do not start before November 6, 2023. 11/6/23  Yes Deena Nunez MD   chlorhexidine (Peridex) 0.12 % solution RINSE WITH 15 ML FOUR TIMES A DAY AFTER MEALS AND AT BEDTIME 2/8/24 2/7/25 Yes Rohit Boles PA-C   cyclobenzaprine (Flexeril) 5 mg tablet Take 1 tablet (5 mg) by mouth 3 times a day. 2/8/24 3/9/24 Yes Rohit Boles PA-C   levothyroxine (LevoxyL) 100 mcg tablet Take 1 tablet (100 mcg) by mouth once daily in the morning. Take before meals. Must take on an empty stomach, 30min to 1 hour before breakfast or other meds. 12/22/23  Yes Rohit Boles PA-C   lisinopril 40 mg tablet Take 1 tablet (40 mg) by mouth once daily. Do not start before November 7, 2023. 11/7/23  Yes Deena Nunez MD   metoprolol succinate XL (Toprol-XL) 50 mg 24 hr tablet Take 1  tablet (50 mg) by mouth once daily. Do not crush or chew. 11/5/23  Yes Deena Nunez MD   OLANZapine (ZyPREXA) 5 mg tablet TAKE 1 TABLET BY MOUTH ONCE DAILY AT BEDTIME 11/5/23  Yes Deena Nunez MD   omeprazole (PriLOSEC) 20 mg DR capsule Take 1 capsule (20 mg) by mouth once daily. 1/12/24 1/11/25 Yes Julio Carlisle MD   penicillin v potassium (Veetid) 500 mg tablet Take by mouth. 1/26/22  Yes Rufus Sheridan MD   rosuvastatin (Crestor) 10 mg tablet Take 1 tablet (10 mg) by mouth once daily. 11/5/23  Yes Deena Nunez MD   spironolactone (Aldactone) 25 mg tablet 1 tablet DAILY (route: oral) 11/5/23  Yes Deena Nunez MD   terazosin (Hytrin) 5 mg capsule Take 1 capsule (5 mg) by mouth once daily at bedtime. 11/5/23  Yes Deena Nunez MD   white petrolatum 41 % ointment ointment Apply 1 Application topically once daily as needed (skin care).  APPLY SPARINGLY TO AFFECTED AREA(S) 3 TIMES A DAY 11/5/23  Yes Deena Nunez MD   omeprazole (PriLOSEC) 20 mg DR capsule Take 2 capsules (40 mg) by mouth once daily. 11/5/23 2/8/24 Yes Deena Nunez MD   amoxicillin-pot clavulanate (Augmentin) 875-125 mg tablet Take 1 tablet (875 mg) by mouth 2 times a day for 26 days. 1/12/24 2/7/24  Julio Carlisle MD   famotidine (Pepcid) 20 mg tablet 1 tablet DAILY (route: oral)  Patient not taking: Reported on 2/8/2024 11/5/23   Deena Nunez MD   FLUoxetine (PROzac) 20 mg tablet Take 1 tablet (20 mg) by mouth once daily.  Patient not taking: Reported on 2/8/2024 11/5/23   Deena Nunez MD   food supplemt, lactose-reduced (Boost High Protein) 0.06 gram- 1 kcal/mL liquid Take 1 Can by mouth once daily.  Patient not taking: Reported on 2/8/2024 11/5/23   Deena Nunez MD   gabapentin 250 mg/5 mL solution Take 5 mL (250 mg) by mouth once daily.  Patient not taking: Reported on 2/8/2024 11/5/23   Deena Nunez MD   chlorhexidine (Peridex) 0.12 % solution RINSE WITH 15 ML FOUR TIMES A DAY AFTER MEALS AND AT BEDTIME  Patient not  taking: Reported on 2/8/2024 3/22/23 2/8/24  Keyona Tran MD   hydroCHLOROthiazide (HYDRODiuril) 25 mg tablet Take 1 tablet (25 mg) by mouth once daily.  Patient not taking: Reported on 2/8/2024 11/5/23 2/8/24  Deena Nunez MD   methocarbamol (Robaxin) 750 mg tablet Take by mouth. 2/22/21 2/8/24  Historical Provider, MD   nitroglycerin (Nitrostat) 0.4 mg SL tablet Place 1 tablet (0.4 mg) under the tongue every 5 minutes if needed for chest pain.  PLACE 1 TABLET UNDER THE TONGUE EVERY 5 MINUTES FOR UP TO 3 DOSES AS NEEDED FOR CHEST PAIN.CALL 911 IF PAIN PERSISTS.  Patient not taking: Reported on 2/8/2024 11/5/23 2/8/24  Deena Nunez MD   pentoxifylline (Trental) 400 mg ER tablet TAKE 1 TABLET 3 TIMES DAILY.  Patient not taking: Reported on 1/19/2024 11/5/23 2/8/24  Deena Nunez MD   SUMAtriptan (Imitrex) 50 mg tablet Take 1 tablet (50 mg) by mouth if needed for migraine.  Patient not taking: Reported on 1/19/2024 11/5/23 2/8/24  Deena Nunez MD        PAT ROS:   Constitutional:   neg    Neuro/Psych:   neg    Eyes:    visual disturbance  Ears:    tinnitus  Nose:   neg    Mouth:    mouth pain  Throat:    dysphagia  Neck:    stiffness   neck stiffness  Cardio:   neg    Respiratory:   neg    Endocrine:   neg    GI:   neg    :   neg    Musculoskeletal:   Hematologic:   neg    Skin:  neg        Physical Exam  Constitutional:       Appearance: Normal appearance.   HENT:      Head: Normocephalic and atraumatic.      Nose: Nose normal.   Eyes:      General:         Right eye: Discharge present.      Comments: Right eye reddened   Neck:      Comments: stiffness  Cardiovascular:      Rate and Rhythm: Normal rate and regular rhythm.      Pulses: Normal pulses.      Heart sounds: Normal heart sounds.   Pulmonary:      Breath sounds: No wheezing.   Abdominal:      Palpations: Abdomen is soft.   Musculoskeletal:         General: Normal range of motion.   Skin:     General: Skin is warm.   Neurological:      General: No  focal deficit present.      Mental Status: She is alert and oriented to person, place, and time.   Psychiatric:         Mood and Affect: Mood normal.         Behavior: Behavior normal.          PAT AIRWAY:   Airway:     Mallampati::  Unable to assess   edentulous      Visit Vitals  /70   Pulse 82   Temp 36.9 °C (98.4 °F)   Resp 20       DASI Risk Score      Flowsheet Row Most Recent Value   DASI SCORE 32.2   METS Score (Will be calculated only when all the questions are answered) 6.7          Caprini DVT Assessment      Flowsheet Row Most Recent Value   DVT Score 12   Current Status Major surgery planned, lasting over 3 hours   History Previous malignancy, Prior major surgery   Age 40-59 years   BMI 31-40 (Obesity)          Modified Frailty Index      Flowsheet Row Most Recent Value   Modified Frailty Index Calculator .0909          CHADS2 Stroke Risk  Current as of 17 minutes ago        N/A 3 - 100%: High Risk   2 - 3%: Medium Risk   0 - 2%: Low Risk     Last Change: N/A          This score determines the patient's risk of having a stroke if the patient has atrial fibrillation.        This score is not applicable to this patient. Components are not calculated.          Revised Cardiac Risk Index      Flowsheet Row Most Recent Value   Revised Cardiac Risk Calculator 0          Apfel Simplified Score      Flowsheet Row Most Recent Value   Apfel Simplified Score Calculator 3          Risk Analysis Index Results This Encounter    No data found in the last 1 encounters.       Stop Bang Score      Flowsheet Row Most Recent Value   Do you snore loudly? 1   Do you often feel tired or fatigued after your sleep? 0   Has anyone ever observed you stop breathing in your sleep? 0   Do you have or are you being treated for high blood pressure? 1   Recent BMI (Calculated) 32.1   Is BMI greater than 35 kg/m2? 0=No   Age older than 50 years old? 1=Yes   Is your neck circumference greater than 17 inches (Male) or 16 inches  (Female)? 0   Gender - Male 0=No   STOP-BANG Total Score 3            Assessment and Plan:     Anesthesia:  The patient denies problems with anesthesia in the past such as PONV, prolonged sedation, awareness, dental damage, aspiration, cardiac arrest, difficult intubation, or unexpected hospital admissions.     Neuro:   The patient has no neurological diagnoses or significant findings on chart review, clinical presentation, and evaluation.  No grossly apparent perioperative risk.    HEENT/Airway  The patient has diagnoses, significant findings on chart review, clinical presentation or evaluation of small mouth opening, prior radiation, face, neck.    Cardiovascular  The patient is scheduled for non-cardiac surgery associated with elevated risk.  The patient has no major cardiac contraindications to non- cardiac surgery.  RCRI  The patient meets 0-1 RCRI criteria and therefore has a less than 1% risk of major adverse cardiac complications.  METS  The patient's functional capacity capacity is greater than 4 METS.  EKG  The patient has no EKG or echocardiographic changes concerning for myocardial ischemia.   Heart Failure  The patient has no known history of heart failure.  Additionally, the patient reports no symptoms of heart failure and demonstrates no signs of heart failure.  Hypertension Evaluation  The patient has a known history of hypertension that is controlled.  Patient's hypertension is most consistent with stage 1.  Heart Rhythm Evaluation  The patient has no history of arrhythmias.  Heart Valve Evaluation  The patient has no known history of valvular heart disease. The patient has no symptoms or physical exam findings to suggest valvular heart disease.  CARDS EVAL  The patient is not followed by cardiology.  The patient has a 30-day risk for MACE of 0 predictors, 3.9% risk for cardiac death, nonfatal myocardial infarction, and nonfatal cardiac arrest.  SELINA score which indicates a 0.5% risk of  intraoperative or 30-day postoperative.    Pulmonary   No significant findings on chart review or clinical presentation and evaluation. The patient is at increased risk of perioperative pulmonary complications secondary to neck surgery.  The patient has a stop bang score of 3, which places patient at low risk for having JARVIS.    ARISCAT 26, Intermediate, 13.3% risk of in-hospital postoperative pulmonary complications  PRODIGY 0, low risk of respiratory depression episode.    Hematology  The patient has diagnoses or significant findings on chart review or clinical presentation and evaluation significant for maxillary cancer, thyroid cancer.  Antiplatelet management   The patient is not currently receiving antiplatelet therapy.  Anticoagulation management  The patient is not currently receiving anticoagulation therapy.  Caprini score 12, high risk of perioperative VTE  Transfusion Evaluation  A type and screen was obtained given the likelihood for perioperative transfusion of blood or blood products.    Gastrointestinal  The patient has diagnoses or significant findings on chart review or clinical presentation and evaluation significant for GERD, Dysphagia.  Eat 10- 19,  self-perceived oropharyngeal dysphagia scale (0-40)     Genitourinary  No diagnoses or significant findings on chart review or clinical presentation and evaluation.    Renal  The patient has a history of chronic kidney disease most consistent with stage 3.  Patient's renal function appears unchanged when compared to prior labs.    Musculoskeletal  No diagnoses or significant findings on chart review or clinical presentation and evaluation.    Endocrine  Diabetes Evaluation  The patient has no history of diabetes mellitus  Thyroid Disease Evaluation  The patient has a history of thyroid disease that appears controlled.  ID  No diagnoses or significant findings on chart review or clinical presentation and evaluation., MRSA screening obtained.  Prescriptions given for Hibiclens and Peridex.    -Preoperative medication instructions were provided and reviewed with the patient.  Any additional testing or evaluation was explained to the patient.  NPO Instructions were discussed, and the patient's questions were answered prior to conclusion of this encounter     Dr. Delgado notified of abnormal lab results.  Recent Results (from the past 336 hour(s))   CBC and Auto Differential    Collection Time: 02/08/24  1:11 PM   Result Value Ref Range    WBC 4.8 4.4 - 11.3 x10*3/uL    nRBC 0.0 0.0 - 0.0 /100 WBCs    RBC 4.46 4.00 - 5.20 x10*6/uL    Hemoglobin 12.9 12.0 - 16.0 g/dL    Hematocrit 41.7 36.0 - 46.0 %    MCV 94 80 - 100 fL    MCH 28.9 26.0 - 34.0 pg    MCHC 30.9 (L) 32.0 - 36.0 g/dL    RDW 16.3 (H) 11.5 - 14.5 %    Platelets 161 150 - 450 x10*3/uL    Neutrophils % 61.2 40.0 - 80.0 %    Immature Granulocytes %, Automated 0.2 0.0 - 0.9 %    Lymphocytes % 28.7 13.0 - 44.0 %    Monocytes % 5.5 2.0 - 10.0 %    Eosinophils % 4.0 0.0 - 6.0 %    Basophils % 0.4 0.0 - 2.0 %    Neutrophils Absolute 2.92 1.20 - 7.70 x10*3/uL    Immature Granulocytes Absolute, Automated 0.01 0.00 - 0.70 x10*3/uL    Lymphocytes Absolute 1.37 1.20 - 4.80 x10*3/uL    Monocytes Absolute 0.26 0.10 - 1.00 x10*3/uL    Eosinophils Absolute 0.19 0.00 - 0.70 x10*3/uL    Basophils Absolute 0.02 0.00 - 0.10 x10*3/uL   Comprehensive Metabolic Panel    Collection Time: 02/08/24  1:11 PM   Result Value Ref Range    Glucose 210 (H) 74 - 99 mg/dL    Sodium 140 136 - 145 mmol/L    Potassium 3.8 3.5 - 5.3 mmol/L    Chloride 105 98 - 107 mmol/L    Bicarbonate 26 21 - 32 mmol/L    Anion Gap 13 10 - 20 mmol/L    Urea Nitrogen 24 (H) 6 - 23 mg/dL    Creatinine 1.22 (H) 0.50 - 1.05 mg/dL    eGFR 53 (L) >60 mL/min/1.73m*2    Calcium 9.8 8.6 - 10.3 mg/dL    Albumin 4.2 3.4 - 5.0 g/dL    Alkaline Phosphatase 55 33 - 110 U/L    Total Protein 7.3 6.4 - 8.2 g/dL    AST 10 9 - 39 U/L    Bilirubin, Total 0.4 0.0 - 1.2 mg/dL     ALT 8 7 - 45 U/L   TSH with reflex to Free T4 if abnormal    Collection Time: 02/08/24  1:11 PM   Result Value Ref Range    Thyroid Stimulating Hormone 15.32 (H) 0.44 - 3.98 mIU/L   Thyroxine, Free    Collection Time: 02/08/24  1:11 PM   Result Value Ref Range    Thyroxine, Free 1.01 0.78 - 1.48 ng/dL   Staphylococcus aureus/MRSA colonization, Culture    Collection Time: 02/08/24  2:42 PM    Specimen: Nares/Axilla/Groin; Swab   Result Value Ref Range    Staph/MRSA Screen Culture No Staphylococcus aureus isolated    Type And Screen    Collection Time: 02/08/24  2:42 PM   Result Value Ref Range    ABO TYPE A     Rh TYPE POS     ANTIBODY SCREEN NEG

## 2024-02-08 NOTE — PREPROCEDURE INSTRUCTIONS
NPO Instructions:    Do not eat any food after midnight the night before your surgery/procedure.  You may have clear liquids until TWO hours before surgery/procedure. This includes water, black tea/coffee, (no milk or cream) apple juice and electrolyte drinks (Gatorade).  You may chew gum up to TWO hours before your surgery/procedure.    Additional Instructions:     Seven/Six Days before Surgery:  Review your medication instructions, stop indicated medications  Five Days before Surgery:  Review your medication instructions, stop indicated medications  Begin using your Hibiclens  Three Days before Surgery:  Review your medication instructions, stop indicated medications  The Day before Surgery:  Review your medication instructions, stop indicated medications  You will be contacted regarding the time of your arrival to facility and surgery time  Do not eat any food after Midnight  Day of Surgery:  Review your medication instructions, take indicated medications  You may have clear liquids until TWO hours before surgery/procedure.  This includes water, black tea/coffee, (no milk or cream) apple juice and electrolyte drinks (Gatorade)  You may chew gum up to TWO hours before your surgery/procedure  Wear  comfortable loose fitting clothing  Do not use moisturizers, creams, lotions or perfume  All jewelry and valuables should be left at home

## 2024-02-09 RX ORDER — LEVOTHYROXINE SODIUM 112 UG/1
112 TABLET ORAL
Qty: 90 TABLET | Refills: 2 | Status: ON HOLD | OUTPATIENT
Start: 2024-02-09 | End: 2024-02-22 | Stop reason: SDUPTHER

## 2024-02-10 LAB — STAPHYLOCOCCUS SPEC CULT: NORMAL

## 2024-02-12 ENCOUNTER — TELEPHONE (OUTPATIENT)
Dept: OTOLARYNGOLOGY | Facility: HOSPITAL | Age: 54
End: 2024-02-12
Payer: COMMERCIAL

## 2024-02-12 NOTE — TELEPHONE ENCOUNTER
----- Message from Kiet Herring RN sent at 2/7/2024  2:08 PM EST -----  Patient called asking if you could call her and go over her CT results she just had done on 1-30. She had questions about the surgery and how much of the bone you were going to have to take off. If you could please call her back whenever you're able to. Thanks.

## 2024-02-12 NOTE — TELEPHONE ENCOUNTER
Called pt to discuss surgery.  Essentially the scan is unchanged, how much we have to do in surgery will depend on what we find. If it is not full thickness and we are able to just debride the bone, then the surgery will be much shorter. However if we create a segmental defect, then we'd have to proceed with a fibula free flap.    Pt understood, will see her at time of surgery

## 2024-02-21 ENCOUNTER — ANESTHESIA EVENT (OUTPATIENT)
Dept: OPERATING ROOM | Facility: HOSPITAL | Age: 54
End: 2024-02-21
Payer: COMMERCIAL

## 2024-02-22 ENCOUNTER — HOSPITAL ENCOUNTER (INPATIENT)
Facility: HOSPITAL | Age: 54
LOS: 3 days | Discharge: HOME | End: 2024-02-25
Attending: OTOLARYNGOLOGY | Admitting: OTOLARYNGOLOGY
Payer: COMMERCIAL

## 2024-02-22 ENCOUNTER — ANESTHESIA (OUTPATIENT)
Dept: OPERATING ROOM | Facility: HOSPITAL | Age: 54
End: 2024-02-22
Payer: COMMERCIAL

## 2024-02-22 DIAGNOSIS — D35.00 ADRENAL ADENOMA, UNSPECIFIED LATERALITY: ICD-10-CM

## 2024-02-22 DIAGNOSIS — F32.1 MAJOR DEPRESSIVE DISORDER, SINGLE EPISODE, MODERATE (MULTI): ICD-10-CM

## 2024-02-22 DIAGNOSIS — I12.9 HYPERTENSIVE RENAL DISEASE: ICD-10-CM

## 2024-02-22 DIAGNOSIS — E03.9 HYPOTHYROIDISM, UNSPECIFIED TYPE: ICD-10-CM

## 2024-02-22 DIAGNOSIS — M62.838 MUSCLE SPASM: ICD-10-CM
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
## 2024-02-22 DIAGNOSIS — C41.1 CANCER OF INFERIOR MAXILLA (MULTI): ICD-10-CM

## 2024-02-22 DIAGNOSIS — I10 HYPERTENSION, UNSPECIFIED TYPE: ICD-10-CM

## 2024-02-22 DIAGNOSIS — M27.2 OSTEORADIONECROSIS OF JAW: Primary | ICD-10-CM

## 2024-02-22 DIAGNOSIS — M27.2 OSTEORADIONECROSIS OF MANDIBLE: ICD-10-CM

## 2024-02-22 PROBLEM — K21.9 GASTROESOPHAGEAL REFLUX DISEASE: Status: ACTIVE | Noted: 2024-02-22

## 2024-02-22 LAB
GLUCOSE BLD MANUAL STRIP-MCNC: 207 MG/DL (ref 74–99)
GLUCOSE BLD MANUAL STRIP-MCNC: 232 MG/DL (ref 74–99)

## 2024-02-22 PROCEDURE — 82947 ASSAY GLUCOSE BLOOD QUANT: CPT

## 2024-02-22 PROCEDURE — 7100000001 HC RECOVERY ROOM TIME - INITIAL BASE CHARGE: Performed by: OTOLARYNGOLOGY

## 2024-02-22 PROCEDURE — RXMED WILLOW AMBULATORY MEDICATION CHARGE

## 2024-02-22 PROCEDURE — 2500000004 HC RX 250 GENERAL PHARMACY W/ HCPCS (ALT 636 FOR OP/ED): Mod: SE | Performed by: PAIN MEDICINE

## 2024-02-22 PROCEDURE — 2500000001 HC RX 250 WO HCPCS SELF ADMINISTERED DRUGS (ALT 637 FOR MEDICARE OP): Mod: SE | Performed by: OTOLARYNGOLOGY

## 2024-02-22 PROCEDURE — 36620 INSERTION CATHETER ARTERY: CPT | Performed by: PAIN MEDICINE

## 2024-02-22 PROCEDURE — 2500000004 HC RX 250 GENERAL PHARMACY W/ HCPCS (ALT 636 FOR OP/ED): Mod: SE | Performed by: OTOLARYNGOLOGY

## 2024-02-22 PROCEDURE — 2500000001 HC RX 250 WO HCPCS SELF ADMINISTERED DRUGS (ALT 637 FOR MEDICARE OP)

## 2024-02-22 PROCEDURE — 88307 TISSUE EXAM BY PATHOLOGIST: CPT | Performed by: PATHOLOGY

## 2024-02-22 PROCEDURE — 0NBT0ZZ EXCISION OF RIGHT MANDIBLE, OPEN APPROACH: ICD-10-PCS | Performed by: OTOLARYNGOLOGY

## 2024-02-22 PROCEDURE — 88307 TISSUE EXAM BY PATHOLOGIST: CPT | Mod: TC,SUR | Performed by: OTOLARYNGOLOGY

## 2024-02-22 PROCEDURE — A21044: Performed by: ANESTHESIOLOGIST ASSISTANT

## 2024-02-22 PROCEDURE — 21044 REMOVAL OF JAW BONE LESION: CPT | Performed by: OTOLARYNGOLOGY

## 2024-02-22 PROCEDURE — 3700000002 HC GENERAL ANESTHESIA TIME - EACH INCREMENTAL 1 MINUTE: Performed by: OTOLARYNGOLOGY

## 2024-02-22 PROCEDURE — A4217 STERILE WATER/SALINE, 500 ML: HCPCS | Mod: SE | Performed by: OTOLARYNGOLOGY

## 2024-02-22 PROCEDURE — 3600000004 HC OR TIME - INITIAL BASE CHARGE - PROCEDURE LEVEL FOUR: Performed by: OTOLARYNGOLOGY

## 2024-02-22 PROCEDURE — 2500000004 HC RX 250 GENERAL PHARMACY W/ HCPCS (ALT 636 FOR OP/ED)

## 2024-02-22 PROCEDURE — 2500000004 HC RX 250 GENERAL PHARMACY W/ HCPCS (ALT 636 FOR OP/ED): Mod: SE | Performed by: ANESTHESIOLOGIST ASSISTANT

## 2024-02-22 PROCEDURE — 3700000001 HC GENERAL ANESTHESIA TIME - INITIAL BASE CHARGE: Performed by: OTOLARYNGOLOGY

## 2024-02-22 PROCEDURE — A4649 SURGICAL SUPPLIES: HCPCS | Performed by: OTOLARYNGOLOGY

## 2024-02-22 PROCEDURE — 2720000007 HC OR 272 NO HCPCS: Performed by: OTOLARYNGOLOGY

## 2024-02-22 PROCEDURE — 3600000009 HC OR TIME - EACH INCREMENTAL 1 MINUTE - PROCEDURE LEVEL FOUR: Performed by: OTOLARYNGOLOGY

## 2024-02-22 PROCEDURE — 2780000003 HC OR 278 NO HCPCS: Performed by: OTOLARYNGOLOGY

## 2024-02-22 PROCEDURE — A21044: Performed by: PAIN MEDICINE

## 2024-02-22 PROCEDURE — 2500000002 HC RX 250 W HCPCS SELF ADMINISTERED DRUGS (ALT 637 FOR MEDICARE OP, ALT 636 FOR OP/ED)

## 2024-02-22 PROCEDURE — 87070 CULTURE OTHR SPECIMN AEROBIC: CPT | Performed by: STUDENT IN AN ORGANIZED HEALTH CARE EDUCATION/TRAINING PROGRAM

## 2024-02-22 PROCEDURE — 87102 FUNGUS ISOLATION CULTURE: CPT | Performed by: STUDENT IN AN ORGANIZED HEALTH CARE EDUCATION/TRAINING PROGRAM

## 2024-02-22 PROCEDURE — 7100000002 HC RECOVERY ROOM TIME - EACH INCREMENTAL 1 MINUTE: Performed by: OTOLARYNGOLOGY

## 2024-02-22 PROCEDURE — C1776 JOINT DEVICE (IMPLANTABLE): HCPCS | Performed by: OTOLARYNGOLOGY

## 2024-02-22 PROCEDURE — 88311 DECALCIFY TISSUE: CPT | Performed by: PATHOLOGY

## 2024-02-22 PROCEDURE — 87116 MYCOBACTERIA CULTURE: CPT | Performed by: STUDENT IN AN ORGANIZED HEALTH CARE EDUCATION/TRAINING PROGRAM

## 2024-02-22 PROCEDURE — 1170000001 HC PRIVATE ONCOLOGY ROOM DAILY

## 2024-02-22 PROCEDURE — 2500000005 HC RX 250 GENERAL PHARMACY W/O HCPCS: Mod: SE | Performed by: ANESTHESIOLOGIST ASSISTANT

## 2024-02-22 DEVICE — IMPLANTABLE DEVICE: Type: IMPLANTABLE DEVICE | Site: MANDIBLE | Status: NON-FUNCTIONAL

## 2024-02-22 RX ORDER — HYDRALAZINE HYDROCHLORIDE 20 MG/ML
INJECTION INTRAMUSCULAR; INTRAVENOUS AS NEEDED
Status: DISCONTINUED | OUTPATIENT
Start: 2024-02-22 | End: 2024-02-22

## 2024-02-22 RX ORDER — GABAPENTIN 250 MG/5ML
250 SOLUTION ORAL DAILY
Start: 2024-02-22

## 2024-02-22 RX ORDER — SENNOSIDES 8.6 MG/1
2 TABLET ORAL NIGHTLY
Status: DISCONTINUED | OUTPATIENT
Start: 2024-02-22 | End: 2024-02-25 | Stop reason: HOSPADM

## 2024-02-22 RX ORDER — DEXAMETHASONE SODIUM PHOSPHATE 100 MG/10ML
INJECTION INTRAMUSCULAR; INTRAVENOUS AS NEEDED
Status: DISCONTINUED | OUTPATIENT
Start: 2024-02-22 | End: 2024-02-22

## 2024-02-22 RX ORDER — ROCURONIUM BROMIDE 10 MG/ML
INJECTION, SOLUTION INTRAVENOUS AS NEEDED
Status: DISCONTINUED | OUTPATIENT
Start: 2024-02-22 | End: 2024-02-22

## 2024-02-22 RX ORDER — FENTANYL CITRATE 50 UG/ML
INJECTION, SOLUTION INTRAMUSCULAR; INTRAVENOUS AS NEEDED
Status: DISCONTINUED | OUTPATIENT
Start: 2024-02-22 | End: 2024-02-22

## 2024-02-22 RX ORDER — SODIUM CHLORIDE, SODIUM LACTATE, POTASSIUM CHLORIDE, CALCIUM CHLORIDE 600; 310; 30; 20 MG/100ML; MG/100ML; MG/100ML; MG/100ML
100 INJECTION, SOLUTION INTRAVENOUS CONTINUOUS
Status: DISCONTINUED | OUTPATIENT
Start: 2024-02-22 | End: 2024-02-22 | Stop reason: HOSPADM

## 2024-02-22 RX ORDER — LABETALOL HYDROCHLORIDE 5 MG/ML
5 INJECTION, SOLUTION INTRAVENOUS EVERY 10 MIN PRN
Status: DISCONTINUED | OUTPATIENT
Start: 2024-02-22 | End: 2024-02-22 | Stop reason: HOSPADM

## 2024-02-22 RX ORDER — DEXAMETHASONE SODIUM PHOSPHATE 4 MG/ML
INJECTION, SOLUTION INTRA-ARTICULAR; INTRALESIONAL; INTRAMUSCULAR; INTRAVENOUS; SOFT TISSUE AS NEEDED
Status: DISCONTINUED | OUTPATIENT
Start: 2024-02-22 | End: 2024-02-22

## 2024-02-22 RX ORDER — GABAPENTIN 250 MG/5ML
250 SOLUTION ORAL DAILY
Status: ON HOLD | COMMUNITY
End: 2024-02-22 | Stop reason: SDUPTHER

## 2024-02-22 RX ORDER — PROPOFOL 10 MG/ML
INJECTION, EMULSION INTRAVENOUS AS NEEDED
Status: DISCONTINUED | OUTPATIENT
Start: 2024-02-22 | End: 2024-02-22

## 2024-02-22 RX ORDER — DEXTROSE 50 % IN WATER (D50W) INTRAVENOUS SYRINGE
25
Status: DISCONTINUED | OUTPATIENT
Start: 2024-02-22 | End: 2024-02-25 | Stop reason: HOSPADM

## 2024-02-22 RX ORDER — ACETAMINOPHEN 160 MG/5ML
1000 LIQUID ORAL EVERY 8 HOURS SCHEDULED
Qty: 1705.2 ML | Refills: 0
Start: 2024-02-22

## 2024-02-22 RX ORDER — POLYETHYLENE GLYCOL 3350 17 G/17G
17 POWDER, FOR SOLUTION ORAL DAILY
Status: DISCONTINUED | OUTPATIENT
Start: 2024-02-22 | End: 2024-02-25 | Stop reason: HOSPADM

## 2024-02-22 RX ORDER — ENOXAPARIN SODIUM 100 MG/ML
40 INJECTION SUBCUTANEOUS EVERY 24 HOURS
Status: DISCONTINUED | OUTPATIENT
Start: 2024-02-22 | End: 2024-02-25 | Stop reason: HOSPADM

## 2024-02-22 RX ORDER — FAMOTIDINE 20 MG/1
20 TABLET, FILM COATED ORAL DAILY
Status: DISCONTINUED | OUTPATIENT
Start: 2024-02-22 | End: 2024-02-25 | Stop reason: HOSPADM

## 2024-02-22 RX ORDER — LEVOTHYROXINE SODIUM 112 UG/1
112 TABLET ORAL
Status: DISCONTINUED | OUTPATIENT
Start: 2024-02-23 | End: 2024-02-25 | Stop reason: HOSPADM

## 2024-02-22 RX ORDER — HYDRALAZINE HYDROCHLORIDE 25 MG/1
50 TABLET, FILM COATED ORAL DAILY
Status: DISCONTINUED | OUTPATIENT
Start: 2024-02-22 | End: 2024-02-25 | Stop reason: HOSPADM

## 2024-02-22 RX ORDER — BACITRACIN ZINC 500 UNIT/G
OINTMENT IN PACKET (EA) TOPICAL 3 TIMES DAILY
Status: COMPLETED | OUTPATIENT
Start: 2024-02-22 | End: 2024-02-24

## 2024-02-22 RX ORDER — HYDROMORPHONE HYDROCHLORIDE 1 MG/ML
0.2 INJECTION, SOLUTION INTRAMUSCULAR; INTRAVENOUS; SUBCUTANEOUS EVERY 5 MIN PRN
Status: DISCONTINUED | OUTPATIENT
Start: 2024-02-22 | End: 2024-02-22 | Stop reason: HOSPADM

## 2024-02-22 RX ORDER — NALOXONE HYDROCHLORIDE 0.4 MG/ML
0.2 INJECTION, SOLUTION INTRAMUSCULAR; INTRAVENOUS; SUBCUTANEOUS EVERY 5 MIN PRN
Status: DISCONTINUED | OUTPATIENT
Start: 2024-02-22 | End: 2024-02-25 | Stop reason: HOSPADM

## 2024-02-22 RX ORDER — ONDANSETRON HYDROCHLORIDE 2 MG/ML
4 INJECTION, SOLUTION INTRAVENOUS EVERY 8 HOURS PRN
Status: DISCONTINUED | OUTPATIENT
Start: 2024-02-22 | End: 2024-02-25 | Stop reason: HOSPADM

## 2024-02-22 RX ORDER — ROSUVASTATIN CALCIUM 10 MG/1
10 TABLET, COATED ORAL DAILY
Start: 2024-02-22

## 2024-02-22 RX ORDER — HYDRALAZINE HYDROCHLORIDE 50 MG/1
50 TABLET, FILM COATED ORAL 3 TIMES DAILY
Start: 2024-02-22

## 2024-02-22 RX ORDER — SODIUM CHLORIDE 9 MG/ML
100 INJECTION, SOLUTION INTRAVENOUS CONTINUOUS
Status: DISCONTINUED | OUTPATIENT
Start: 2024-02-22 | End: 2024-02-25 | Stop reason: HOSPADM

## 2024-02-22 RX ORDER — CYCLOBENZAPRINE HCL 10 MG
5 TABLET ORAL 3 TIMES DAILY
Status: DISCONTINUED | OUTPATIENT
Start: 2024-02-22 | End: 2024-02-25 | Stop reason: HOSPADM

## 2024-02-22 RX ORDER — DEXTROSE MONOHYDRATE 100 MG/ML
0.3 INJECTION, SOLUTION INTRAVENOUS ONCE AS NEEDED
Status: DISCONTINUED | OUTPATIENT
Start: 2024-02-22 | End: 2024-02-25 | Stop reason: HOSPADM

## 2024-02-22 RX ORDER — FLUOXETINE 20 MG/1
20 TABLET ORAL DAILY
Status: DISCONTINUED | OUTPATIENT
Start: 2024-02-22 | End: 2024-02-24 | Stop reason: RX

## 2024-02-22 RX ORDER — LIDOCAINE HYDROCHLORIDE 20 MG/ML
INJECTION, SOLUTION INFILTRATION; PERINEURAL AS NEEDED
Status: DISCONTINUED | OUTPATIENT
Start: 2024-02-22 | End: 2024-02-22

## 2024-02-22 RX ORDER — METOPROLOL SUCCINATE 50 MG/1
50 TABLET, EXTENDED RELEASE ORAL DAILY
Start: 2024-02-22

## 2024-02-22 RX ORDER — CHLORHEXIDINE GLUCONATE ORAL RINSE 1.2 MG/ML
15 SOLUTION DENTAL 3 TIMES DAILY
Status: DISCONTINUED | OUTPATIENT
Start: 2024-02-22 | End: 2024-02-25 | Stop reason: HOSPADM

## 2024-02-22 RX ORDER — ONDANSETRON HYDROCHLORIDE 2 MG/ML
INJECTION, SOLUTION INTRAVENOUS AS NEEDED
Status: DISCONTINUED | OUTPATIENT
Start: 2024-02-22 | End: 2024-02-22

## 2024-02-22 RX ORDER — OXYCODONE HCL 5 MG/5 ML
5 SOLUTION, ORAL ORAL EVERY 4 HOURS PRN
Status: DISCONTINUED | OUTPATIENT
Start: 2024-02-22 | End: 2024-02-25 | Stop reason: HOSPADM

## 2024-02-22 RX ORDER — ESMOLOL HYDROCHLORIDE 10 MG/ML
INJECTION INTRAVENOUS AS NEEDED
Status: DISCONTINUED | OUTPATIENT
Start: 2024-02-22 | End: 2024-02-22

## 2024-02-22 RX ORDER — BACITRACIN 500 [USP'U]/G
OINTMENT TOPICAL AS NEEDED
Status: DISCONTINUED | OUTPATIENT
Start: 2024-02-22 | End: 2024-02-22 | Stop reason: HOSPADM

## 2024-02-22 RX ORDER — PETROLATUM 420 MG/G
1 OINTMENT TOPICAL 3 TIMES DAILY
Status: DISCONTINUED | OUTPATIENT
Start: 2024-02-24 | End: 2024-02-25 | Stop reason: HOSPADM

## 2024-02-22 RX ORDER — SODIUM CHLORIDE 0.9 G/100ML
IRRIGANT IRRIGATION AS NEEDED
Status: DISCONTINUED | OUTPATIENT
Start: 2024-02-22 | End: 2024-02-22 | Stop reason: HOSPADM

## 2024-02-22 RX ORDER — WATER 1 ML/ML
IRRIGANT IRRIGATION AS NEEDED
Status: DISCONTINUED | OUTPATIENT
Start: 2024-02-22 | End: 2024-02-22 | Stop reason: HOSPADM

## 2024-02-22 RX ORDER — LISINOPRIL 40 MG/1
40 TABLET ORAL DAILY
Start: 2024-02-22

## 2024-02-22 RX ORDER — FAMOTIDINE 20 MG/1
20 TABLET, FILM COATED ORAL DAILY
Start: 2024-02-22

## 2024-02-22 RX ORDER — SODIUM CHLORIDE, SODIUM LACTATE, POTASSIUM CHLORIDE, CALCIUM CHLORIDE 600; 310; 30; 20 MG/100ML; MG/100ML; MG/100ML; MG/100ML
INJECTION, SOLUTION INTRAVENOUS CONTINUOUS PRN
Status: DISCONTINUED | OUTPATIENT
Start: 2024-02-22 | End: 2024-02-22

## 2024-02-22 RX ORDER — OXYCODONE HYDROCHLORIDE 5 MG/1
5 TABLET ORAL EVERY 4 HOURS PRN
Status: DISCONTINUED | OUTPATIENT
Start: 2024-02-22 | End: 2024-02-22 | Stop reason: HOSPADM

## 2024-02-22 RX ORDER — SENNOSIDES 8.6 MG/1
2 TABLET ORAL NIGHTLY
Qty: 20 TABLET | Refills: 0 | Status: SHIPPED | OUTPATIENT
Start: 2024-02-22 | End: 2024-03-04

## 2024-02-22 RX ORDER — AMOXICILLIN AND CLAVULANATE POTASSIUM 875; 125 MG/1; MG/1
875 TABLET, FILM COATED ORAL 2 TIMES DAILY
Qty: 42 TABLET | Refills: 0 | Status: SHIPPED | OUTPATIENT
Start: 2024-02-22 | End: 2024-03-15

## 2024-02-22 RX ORDER — FLUOXETINE 20 MG/1
20 TABLET ORAL DAILY
Status: ON HOLD | COMMUNITY
End: 2024-02-22 | Stop reason: SDUPTHER

## 2024-02-22 RX ORDER — HYDROMORPHONE HYDROCHLORIDE 1 MG/ML
0.5 INJECTION, SOLUTION INTRAMUSCULAR; INTRAVENOUS; SUBCUTANEOUS EVERY 5 MIN PRN
Status: DISCONTINUED | OUTPATIENT
Start: 2024-02-22 | End: 2024-02-22 | Stop reason: HOSPADM

## 2024-02-22 RX ORDER — CYCLOBENZAPRINE HCL 5 MG
5 TABLET ORAL NIGHTLY
Start: 2024-02-22

## 2024-02-22 RX ORDER — DEXMEDETOMIDINE HYDROCHLORIDE 4 UG/ML
INJECTION, SOLUTION INTRAVENOUS CONTINUOUS PRN
Status: DISCONTINUED | OUTPATIENT
Start: 2024-02-22 | End: 2024-02-22

## 2024-02-22 RX ORDER — HYDRALAZINE HYDROCHLORIDE 50 MG/1
50 TABLET, FILM COATED ORAL 3 TIMES DAILY
Status: ON HOLD | COMMUNITY
End: 2024-02-22 | Stop reason: SDUPTHER

## 2024-02-22 RX ORDER — METOPROLOL TARTRATE 25 MG/1
25 TABLET, FILM COATED ORAL 2 TIMES DAILY
Qty: 70 TABLET | Refills: 0 | Status: SHIPPED | OUTPATIENT
Start: 2024-02-22 | End: 2024-05-03 | Stop reason: SDUPTHER

## 2024-02-22 RX ORDER — LEVOTHYROXINE SODIUM 112 UG/1
112 TABLET ORAL
Start: 2024-02-22 | End: 2024-04-17

## 2024-02-22 RX ORDER — GLYCOPYRROLATE 0.2 MG/ML
INJECTION INTRAMUSCULAR; INTRAVENOUS AS NEEDED
Status: DISCONTINUED | OUTPATIENT
Start: 2024-02-22 | End: 2024-02-22

## 2024-02-22 RX ORDER — ACETAMINOPHEN 325 MG/1
650 TABLET ORAL EVERY 8 HOURS PRN
Status: DISCONTINUED | OUTPATIENT
Start: 2024-02-22 | End: 2024-02-22 | Stop reason: HOSPADM

## 2024-02-22 RX ORDER — TERAZOSIN 1 MG/1
5 CAPSULE ORAL NIGHTLY
Status: DISCONTINUED | OUTPATIENT
Start: 2024-02-22 | End: 2024-02-25 | Stop reason: HOSPADM

## 2024-02-22 RX ORDER — ROSUVASTATIN CALCIUM 10 MG/1
10 TABLET, COATED ORAL DAILY
Status: DISCONTINUED | OUTPATIENT
Start: 2024-02-22 | End: 2024-02-25 | Stop reason: HOSPADM

## 2024-02-22 RX ORDER — GABAPENTIN 250 MG/5ML
250 SOLUTION ORAL DAILY
Status: DISCONTINUED | OUTPATIENT
Start: 2024-02-22 | End: 2024-02-25 | Stop reason: HOSPADM

## 2024-02-22 RX ORDER — SPIRONOLACTONE 25 MG/1
25 TABLET ORAL DAILY
Status: DISCONTINUED | OUTPATIENT
Start: 2024-02-22 | End: 2024-02-25 | Stop reason: HOSPADM

## 2024-02-22 RX ORDER — PHENYLEPHRINE HCL IN 0.9% NACL 1 MG/10 ML
SYRINGE (ML) INTRAVENOUS AS NEEDED
Status: DISCONTINUED | OUTPATIENT
Start: 2024-02-22 | End: 2024-02-22

## 2024-02-22 RX ORDER — OXYCODONE HCL 5 MG/5 ML
5 SOLUTION, ORAL ORAL EVERY 6 HOURS PRN
Qty: 100 ML | Refills: 0 | Status: SHIPPED | OUTPATIENT
Start: 2024-02-22 | End: 2024-02-29 | Stop reason: SDUPTHER

## 2024-02-22 RX ORDER — CHLORHEXIDINE GLUCONATE ORAL RINSE 1.2 MG/ML
15 SOLUTION DENTAL 3 TIMES DAILY
Qty: 1419 ML | Refills: 1 | Status: SHIPPED | OUTPATIENT
Start: 2024-02-22 | End: 2024-05-18

## 2024-02-22 RX ORDER — FAMOTIDINE 20 MG/1
20 TABLET, FILM COATED ORAL DAILY
Status: ON HOLD | COMMUNITY
End: 2024-02-22 | Stop reason: SDUPTHER

## 2024-02-22 RX ORDER — METOPROLOL TARTRATE 25 MG/1
25 TABLET, FILM COATED ORAL 2 TIMES DAILY
Status: DISCONTINUED | OUTPATIENT
Start: 2024-02-22 | End: 2024-02-25 | Stop reason: HOSPADM

## 2024-02-22 RX ORDER — PETROLATUM 420 MG/G
1 OINTMENT TOPICAL 3 TIMES DAILY
Qty: 100 G | Refills: 0 | Status: SHIPPED | OUTPATIENT
Start: 2024-02-24 | End: 2024-03-25

## 2024-02-22 RX ORDER — AMLODIPINE BESYLATE 10 MG/1
10 TABLET ORAL DAILY
Start: 2024-02-22

## 2024-02-22 RX ORDER — TERAZOSIN 5 MG/1
5 CAPSULE ORAL NIGHTLY
Start: 2024-02-22

## 2024-02-22 RX ORDER — ACETAMINOPHEN 160 MG/5ML
1000 SOLUTION ORAL EVERY 8 HOURS
Status: DISCONTINUED | OUTPATIENT
Start: 2024-02-22 | End: 2024-02-25 | Stop reason: HOSPADM

## 2024-02-22 RX ORDER — MIDAZOLAM HYDROCHLORIDE 1 MG/ML
INJECTION INTRAMUSCULAR; INTRAVENOUS AS NEEDED
Status: DISCONTINUED | OUTPATIENT
Start: 2024-02-22 | End: 2024-02-22

## 2024-02-22 RX ORDER — INSULIN LISPRO 100 [IU]/ML
0-5 INJECTION, SOLUTION INTRAVENOUS; SUBCUTANEOUS EVERY 4 HOURS
Status: DISCONTINUED | OUTPATIENT
Start: 2024-02-22 | End: 2024-02-25 | Stop reason: HOSPADM

## 2024-02-22 RX ORDER — HYDROMORPHONE HYDROCHLORIDE 1 MG/ML
INJECTION, SOLUTION INTRAMUSCULAR; INTRAVENOUS; SUBCUTANEOUS AS NEEDED
Status: DISCONTINUED | OUTPATIENT
Start: 2024-02-22 | End: 2024-02-22

## 2024-02-22 RX ORDER — FLUOXETINE 20 MG/1
20 TABLET ORAL DAILY
Start: 2024-02-22

## 2024-02-22 RX ORDER — METOPROLOL TARTRATE 1 MG/ML
INJECTION, SOLUTION INTRAVENOUS AS NEEDED
Status: DISCONTINUED | OUTPATIENT
Start: 2024-02-22 | End: 2024-02-22

## 2024-02-22 RX ORDER — ONDANSETRON HYDROCHLORIDE 2 MG/ML
4 INJECTION, SOLUTION INTRAVENOUS ONCE AS NEEDED
Status: DISCONTINUED | OUTPATIENT
Start: 2024-02-22 | End: 2024-02-22 | Stop reason: HOSPADM

## 2024-02-22 RX ORDER — AMPICILLIN AND SULBACTAM 2; 1 G/1; G/1
INJECTION, POWDER, FOR SOLUTION INTRAMUSCULAR; INTRAVENOUS AS NEEDED
Status: DISCONTINUED | OUTPATIENT
Start: 2024-02-22 | End: 2024-02-22

## 2024-02-22 RX ORDER — SPIRONOLACTONE 25 MG/1
25 TABLET ORAL ONCE
Start: 2024-02-22 | End: 2024-02-22

## 2024-02-22 RX ADMIN — PROPOFOL 50 MG: 10 INJECTION, EMULSION INTRAVENOUS at 08:10

## 2024-02-22 RX ADMIN — PROPOFOL 100 MG: 10 INJECTION, EMULSION INTRAVENOUS at 07:46

## 2024-02-22 RX ADMIN — ESMOLOL HYDROCHLORIDE 50 MG: 10 INJECTION, SOLUTION INTRAVENOUS at 08:21

## 2024-02-22 RX ADMIN — ONDANSETRON 4 MG: 2 INJECTION INTRAMUSCULAR; INTRAVENOUS at 21:16

## 2024-02-22 RX ADMIN — FENTANYL CITRATE 50 MCG: 50 INJECTION, SOLUTION INTRAMUSCULAR; INTRAVENOUS at 07:30

## 2024-02-22 RX ADMIN — BACITRACIN 1 APPLICATION: 500 OINTMENT TOPICAL at 21:15

## 2024-02-22 RX ADMIN — REMIFENTANIL HYDROCHLORIDE 0.2 MCG/KG/MIN: 1 INJECTION, POWDER, LYOPHILIZED, FOR SOLUTION INTRAVENOUS at 08:47

## 2024-02-22 RX ADMIN — MIDAZOLAM HYDROCHLORIDE 2 MG: 1 INJECTION, SOLUTION INTRAMUSCULAR; INTRAVENOUS at 07:30

## 2024-02-22 RX ADMIN — ESMOLOL HYDROCHLORIDE 50 MG: 10 INJECTION, SOLUTION INTRAVENOUS at 08:35

## 2024-02-22 RX ADMIN — METOPROLOL TARTRATE 5 MG: 1 INJECTION, SOLUTION INTRAVENOUS at 11:12

## 2024-02-22 RX ADMIN — ROCURONIUM BROMIDE 50 MG: 10 INJECTION INTRAVENOUS at 07:46

## 2024-02-22 RX ADMIN — FENTANYL CITRATE 50 MCG: 50 INJECTION, SOLUTION INTRAMUSCULAR; INTRAVENOUS at 07:46

## 2024-02-22 RX ADMIN — AMPICILLIN AND SULBACTAM 3 G: 2; 1 INJECTION, POWDER, FOR SOLUTION INTRAMUSCULAR; INTRAVENOUS at 08:00

## 2024-02-22 RX ADMIN — ESMOLOL HYDROCHLORIDE 10 MG: 10 INJECTION, SOLUTION INTRAVENOUS at 09:49

## 2024-02-22 RX ADMIN — ACETAMINOPHEN 1000 MG: 650 SOLUTION ORAL at 18:05

## 2024-02-22 RX ADMIN — HYDROMORPHONE HYDROCHLORIDE 1 MG: 1 INJECTION, SOLUTION INTRAMUSCULAR; INTRAVENOUS; SUBCUTANEOUS at 11:00

## 2024-02-22 RX ADMIN — BACITRACIN 1 APPLICATION: 500 OINTMENT TOPICAL at 15:02

## 2024-02-22 RX ADMIN — LIDOCAINE HYDROCHLORIDE 60 MG: 20 INJECTION, SOLUTION INFILTRATION; PERINEURAL at 07:46

## 2024-02-22 RX ADMIN — TERAZOSIN HYDROCHLORIDE 5 MG: 1 CAPSULE ORAL at 21:15

## 2024-02-22 RX ADMIN — PROPOFOL 50 MG: 10 INJECTION, EMULSION INTRAVENOUS at 08:27

## 2024-02-22 RX ADMIN — METOPROLOL TARTRATE 25 MG: 25 TABLET, FILM COATED ORAL at 21:16

## 2024-02-22 RX ADMIN — ESMOLOL HYDROCHLORIDE 30 MG: 10 INJECTION, SOLUTION INTRAVENOUS at 10:44

## 2024-02-22 RX ADMIN — SODIUM CHLORIDE 100 ML/HR: 9 INJECTION, SOLUTION INTRAVENOUS at 15:02

## 2024-02-22 RX ADMIN — HYDROMORPHONE HYDROCHLORIDE 0.5 MG: 1 INJECTION, SOLUTION INTRAMUSCULAR; INTRAVENOUS; SUBCUTANEOUS at 12:00

## 2024-02-22 RX ADMIN — OXYCODONE HYDROCHLORIDE 5 MG: 5 SOLUTION ORAL at 15:02

## 2024-02-22 RX ADMIN — ROCURONIUM BROMIDE 30 MG: 10 INJECTION INTRAVENOUS at 08:33

## 2024-02-22 RX ADMIN — ROSUVASTATIN 10 MG: 10 TABLET, FILM COATED ORAL at 18:25

## 2024-02-22 RX ADMIN — DEXAMETHASONE SODIUM PHOSPHATE 10 MG: 4 INJECTION, SOLUTION INTRAMUSCULAR; INTRAVENOUS at 08:08

## 2024-02-22 RX ADMIN — CHLORHEXIDINE GLUCONATE 15 ML: 1.2 SOLUTION ORAL at 16:51

## 2024-02-22 RX ADMIN — FAMOTIDINE 20 MG: 20 TABLET, FILM COATED ORAL at 18:21

## 2024-02-22 RX ADMIN — SUGAMMADEX 200 MG: 100 INJECTION, SOLUTION INTRAVENOUS at 10:47

## 2024-02-22 RX ADMIN — HYDRALAZINE HYDROCHLORIDE 20 MG: 20 INJECTION INTRAMUSCULAR; INTRAVENOUS at 11:12

## 2024-02-22 RX ADMIN — GLYCOPYRROLATE 0.3 MG: 0.2 INJECTION INTRAMUSCULAR; INTRAVENOUS at 07:30

## 2024-02-22 RX ADMIN — ONDANSETRON 4 MG: 2 INJECTION INTRAMUSCULAR; INTRAVENOUS at 10:23

## 2024-02-22 RX ADMIN — ESMOLOL HYDROCHLORIDE 30 MG: 10 INJECTION, SOLUTION INTRAVENOUS at 07:47

## 2024-02-22 RX ADMIN — SODIUM CHLORIDE, POTASSIUM CHLORIDE, SODIUM LACTATE AND CALCIUM CHLORIDE: 600; 310; 30; 20 INJECTION, SOLUTION INTRAVENOUS at 07:25

## 2024-02-22 RX ADMIN — HYDRALAZINE HYDROCHLORIDE 50 MG: 25 TABLET ORAL at 18:20

## 2024-02-22 RX ADMIN — Medication 200 MCG: at 08:10

## 2024-02-22 RX ADMIN — HYDROMORPHONE HYDROCHLORIDE 0.5 MG: 1 INJECTION, SOLUTION INTRAMUSCULAR; INTRAVENOUS; SUBCUTANEOUS at 11:40

## 2024-02-22 RX ADMIN — OXYCODONE HYDROCHLORIDE 5 MG: 5 SOLUTION ORAL at 21:16

## 2024-02-22 RX ADMIN — SENNOSIDES 17.2 MG: 8.6 TABLET, FILM COATED ORAL at 21:16

## 2024-02-22 RX ADMIN — SPIRONOLACTONE 25 MG: 25 TABLET, FILM COATED ORAL at 18:20

## 2024-02-22 RX ADMIN — GABAPENTIN 250 MG: 250 SOLUTION ORAL at 18:20

## 2024-02-22 RX ADMIN — ESMOLOL HYDROCHLORIDE 30 MG: 10 INJECTION, SOLUTION INTRAVENOUS at 08:51

## 2024-02-22 RX ADMIN — CYCLOBENZAPRINE 5 MG: 10 TABLET, FILM COATED ORAL at 21:16

## 2024-02-22 RX ADMIN — ENOXAPARIN SODIUM 40 MG: 100 INJECTION SUBCUTANEOUS at 16:52

## 2024-02-22 RX ADMIN — HYDROMORPHONE HYDROCHLORIDE 1 MG: 1 INJECTION, SOLUTION INTRAMUSCULAR; INTRAVENOUS; SUBCUTANEOUS at 10:21

## 2024-02-22 RX ADMIN — CHLORHEXIDINE GLUCONATE 15 ML: 1.2 SOLUTION ORAL at 21:15

## 2024-02-22 RX ADMIN — INSULIN LISPRO 2 UNITS: 100 INJECTION, SOLUTION INTRAVENOUS; SUBCUTANEOUS at 18:02

## 2024-02-22 RX ADMIN — DEXMEDETOMIDINE HYDROCHLORIDE 0.8 MCG/KG/HR: 4 INJECTION, SOLUTION INTRAVENOUS at 11:04

## 2024-02-22 SDOH — SOCIAL STABILITY: SOCIAL INSECURITY: DOES ANYONE TRY TO KEEP YOU FROM HAVING/CONTACTING OTHER FRIENDS OR DOING THINGS OUTSIDE YOUR HOME?: NO

## 2024-02-22 SDOH — ECONOMIC STABILITY: HOUSING INSECURITY
IN THE LAST 12 MONTHS, WAS THERE A TIME WHEN YOU DID NOT HAVE A STEADY PLACE TO SLEEP OR SLEPT IN A SHELTER (INCLUDING NOW)?: NO

## 2024-02-22 SDOH — SOCIAL STABILITY: SOCIAL INSECURITY: ARE THERE ANY APPARENT SIGNS OF INJURIES/BEHAVIORS THAT COULD BE RELATED TO ABUSE/NEGLECT?: NO

## 2024-02-22 SDOH — ECONOMIC STABILITY: TRANSPORTATION INSECURITY
IN THE PAST 12 MONTHS, HAS LACK OF TRANSPORTATION KEPT YOU FROM MEETINGS, WORK, OR FROM GETTING THINGS NEEDED FOR DAILY LIVING?: NO

## 2024-02-22 SDOH — ECONOMIC STABILITY: INCOME INSECURITY: IN THE LAST 12 MONTHS, WAS THERE A TIME WHEN YOU WERE NOT ABLE TO PAY THE MORTGAGE OR RENT ON TIME?: NO

## 2024-02-22 SDOH — ECONOMIC STABILITY: TRANSPORTATION INSECURITY
IN THE PAST 12 MONTHS, HAS THE LACK OF TRANSPORTATION KEPT YOU FROM MEDICAL APPOINTMENTS OR FROM GETTING MEDICATIONS?: NO

## 2024-02-22 SDOH — SOCIAL STABILITY: SOCIAL INSECURITY: DO YOU FEEL UNSAFE GOING BACK TO THE PLACE WHERE YOU ARE LIVING?: NO

## 2024-02-22 SDOH — SOCIAL STABILITY: SOCIAL INSECURITY: DO YOU FEEL ANYONE HAS EXPLOITED OR TAKEN ADVANTAGE OF YOU FINANCIALLY OR OF YOUR PERSONAL PROPERTY?: NO

## 2024-02-22 SDOH — ECONOMIC STABILITY: INCOME INSECURITY: HOW HARD IS IT FOR YOU TO PAY FOR THE VERY BASICS LIKE FOOD, HOUSING, MEDICAL CARE, AND HEATING?: NOT VERY HARD

## 2024-02-22 SDOH — SOCIAL STABILITY: SOCIAL INSECURITY: HAVE YOU HAD THOUGHTS OF HARMING ANYONE ELSE?: NO

## 2024-02-22 SDOH — SOCIAL STABILITY: SOCIAL INSECURITY: ARE YOU OR HAVE YOU BEEN THREATENED OR ABUSED PHYSICALLY, EMOTIONALLY, OR SEXUALLY BY ANYONE?: NO

## 2024-02-22 SDOH — ECONOMIC STABILITY: HOUSING INSECURITY: IN THE LAST 12 MONTHS, HOW MANY PLACES HAVE YOU LIVED?: 1

## 2024-02-22 SDOH — SOCIAL STABILITY: SOCIAL INSECURITY: ABUSE: ADULT

## 2024-02-22 SDOH — SOCIAL STABILITY: SOCIAL INSECURITY: HAS ANYONE EVER THREATENED TO HURT YOUR FAMILY OR YOUR PETS?: NO

## 2024-02-22 SDOH — SOCIAL STABILITY: SOCIAL INSECURITY: WERE YOU ABLE TO COMPLETE ALL THE BEHAVIORAL HEALTH SCREENINGS?: YES

## 2024-02-22 ASSESSMENT — ACTIVITIES OF DAILY LIVING (ADL)
ADEQUATE_TO_COMPLETE_ADL: YES
GROOMING: INDEPENDENT
HEARING - LEFT EAR: FUNCTIONAL
WALKS IN HOME: INDEPENDENT
FEEDING YOURSELF: INDEPENDENT
PATIENT'S MEMORY ADEQUATE TO SAFELY COMPLETE DAILY ACTIVITIES?: YES
TOILETING: INDEPENDENT
BATHING: INDEPENDENT
DRESSING YOURSELF: INDEPENDENT
JUDGMENT_ADEQUATE_SAFELY_COMPLETE_DAILY_ACTIVITIES: YES
HEARING - RIGHT EAR: FUNCTIONAL

## 2024-02-22 ASSESSMENT — COGNITIVE AND FUNCTIONAL STATUS - GENERAL
HELP NEEDED FOR BATHING: A LITTLE
DAILY ACTIVITIY SCORE: 20
DRESSING REGULAR LOWER BODY CLOTHING: A LITTLE
HELP NEEDED FOR BATHING: A LITTLE
MOBILITY SCORE: 20
MOBILITY SCORE: 24
WALKING IN HOSPITAL ROOM: A LITTLE
MOVING TO AND FROM BED TO CHAIR: A LITTLE
DAILY ACTIVITIY SCORE: 21
MOBILITY SCORE: 19
TOILETING: A LITTLE
DRESSING REGULAR UPPER BODY CLOTHING: A LITTLE
STANDING UP FROM CHAIR USING ARMS: A LITTLE
WALKING IN HOSPITAL ROOM: A LITTLE
DRESSING REGULAR UPPER BODY CLOTHING: A LITTLE
CLIMB 3 TO 5 STEPS WITH RAILING: A LITTLE
DRESSING REGULAR LOWER BODY CLOTHING: A LITTLE
MOVING TO AND FROM BED TO CHAIR: A LITTLE
DRESSING REGULAR UPPER BODY CLOTHING: A LITTLE
DRESSING REGULAR LOWER BODY CLOTHING: A LITTLE
STANDING UP FROM CHAIR USING ARMS: A LITTLE
PATIENT BASELINE BEDBOUND: NO
DAILY ACTIVITIY SCORE: 21
HELP NEEDED FOR BATHING: A LITTLE
CLIMB 3 TO 5 STEPS WITH RAILING: A LOT

## 2024-02-22 ASSESSMENT — PAIN SCALES - GENERAL
PAINLEVEL_OUTOF10: 9
PAINLEVEL_OUTOF10: 5 - MODERATE PAIN
PAINLEVEL_OUTOF10: 0 - NO PAIN
PAINLEVEL_OUTOF10: 5 - MODERATE PAIN
PAINLEVEL_OUTOF10: 8
PAINLEVEL_OUTOF10: 9
PAINLEVEL_OUTOF10: 3
PAINLEVEL_OUTOF10: 4
PAINLEVEL_OUTOF10: 6
PAINLEVEL_OUTOF10: 4
PAINLEVEL_OUTOF10: 0 - NO PAIN
PAINLEVEL_OUTOF10: 5 - MODERATE PAIN
PAINLEVEL_OUTOF10: 3
PAINLEVEL_OUTOF10: 7
PAINLEVEL_OUTOF10: 7
PAINLEVEL_OUTOF10: 10 - WORST POSSIBLE PAIN

## 2024-02-22 ASSESSMENT — LIFESTYLE VARIABLES
AUDIT-C TOTAL SCORE: 0
HOW OFTEN DO YOU HAVE A DRINK CONTAINING ALCOHOL: NEVER
AUDIT-C TOTAL SCORE: 0
HOW OFTEN DO YOU HAVE 6 OR MORE DRINKS ON ONE OCCASION: NEVER
SKIP TO QUESTIONS 9-10: 1
HOW MANY STANDARD DRINKS CONTAINING ALCOHOL DO YOU HAVE ON A TYPICAL DAY: PATIENT DOES NOT DRINK

## 2024-02-22 ASSESSMENT — PAIN - FUNCTIONAL ASSESSMENT
PAIN_FUNCTIONAL_ASSESSMENT: 0-10
PAIN_FUNCTIONAL_ASSESSMENT: UNABLE TO SELF-REPORT
PAIN_FUNCTIONAL_ASSESSMENT: 0-10

## 2024-02-22 ASSESSMENT — PATIENT HEALTH QUESTIONNAIRE - PHQ9
1. LITTLE INTEREST OR PLEASURE IN DOING THINGS: NOT AT ALL
2. FEELING DOWN, DEPRESSED OR HOPELESS: NOT AT ALL
SUM OF ALL RESPONSES TO PHQ9 QUESTIONS 1 & 2: 0

## 2024-02-22 ASSESSMENT — COLUMBIA-SUICIDE SEVERITY RATING SCALE - C-SSRS
2. HAVE YOU ACTUALLY HAD ANY THOUGHTS OF KILLING YOURSELF?: NO
6. HAVE YOU EVER DONE ANYTHING, STARTED TO DO ANYTHING, OR PREPARED TO DO ANYTHING TO END YOUR LIFE?: NO
1. IN THE PAST MONTH, HAVE YOU WISHED YOU WERE DEAD OR WISHED YOU COULD GO TO SLEEP AND NOT WAKE UP?: NO

## 2024-02-22 ASSESSMENT — PAIN DESCRIPTION - DESCRIPTORS: DESCRIPTORS: TIGHTNESS

## 2024-02-22 NOTE — PROGRESS NOTES
Pharmacy Medication History Review    Pamella Rose is a 53 y.o. female admitted for Osteoradionecrosis of jaw. Pharmacy reviewed the patient's [id number] medications and allergies for accuracy.    The list below reflects the updated PTA list. Comments regarding how patient may be taking medications differently can be found in the Admit Orders Activity  Prior to Admission Medications   Prescriptions Last Dose Informant   FLUoxetine (PROzac) 20 mg tablet More than a month at Patient needs refill so has not been taking, last fill 11/29/23 x 30 days Self   Sig: Take 1 tablet (20 mg) by mouth once daily.   OLANZapine (ZyPREXA) 5 mg tablet  at Patient needs refill so has not been taking, last fill 11/29/23 x 30 days Self   Sig: TAKE 1 TABLET BY MOUTH ONCE DAILY AT BEDTIME   acetaminophen (Tylenol) 160 mg/5 mL liquid Not Taking Self   Si.2 mL (1,000 mg) by g-tube route every 8 hours for 14 days.   Patient not taking: Reported on 2024   amLODIPine (Norvasc) 10 mg tablet Past Week Self   Sig: Take 1 tablet (10 mg) by mouth once daily. Do not start before 2023.   cyclobenzaprine (Flexeril) 5 mg tablet Past Week Self   Sig: Take 1 tablet (5 mg) by mouth 3 times a day.   famotidine (Pepcid) 20 mg tablet Past Week Self   Sig: Take 1 tablet (20 mg) by mouth once daily.   gabapentin 250 mg/5 mL (5 mL) solution More than a month at Patient needs refill so has not been taking, last fill 11/29/23 x 30 days Self   Sig: Take 250 mg by mouth once daily.   hydrALAZINE (Apresoline) 50 mg tablet  at Patient reports only taking once daily (last filled  for 3 times a day) Self   Sig: Take 1 tablet (50 mg) by mouth 3 times a day.   levothyroxine (Synthroid, Levoxyl) 112 mcg tablet 2024 Self   Sig: Take 1 tablet (112 mcg) by mouth once daily in the morning. Take before meals.   lisinopril 40 mg tablet Past Week Self   Sig: Take 1 tablet (40 mg) by mouth once daily. Do not start before 2023.    metoprolol succinate XL (Toprol-XL) 50 mg 24 hr tablet Past Week Self   Sig: Take 1 tablet (50 mg) by mouth once daily. Do not crush or chew.   omeprazole (PriLOSEC) 20 mg DR capsule Past Week Self   Sig: Take 1 capsule (20 mg) by mouth once daily.   rosuvastatin (Crestor) 10 mg tablet Past Week Self   Sig: Take 1 tablet (10 mg) by mouth once daily.   spironolactone (Aldactone) 25 mg tablet Past Week Self   Si tablet DAILY (route: oral)   terazosin (Hytrin) 5 mg capsule Past Week Self   Sig: Take 1 capsule (5 mg) by mouth once daily at bedtime.   white petrolatum 41 % ointment ointment Unknown Self   Sig: Apply 1 Application topically once daily as needed (skin care).  APPLY SPARINGLY TO AFFECTED AREA(S) 3 TIMES A DAY      Facility-Administered Medications: None        The list below reflects the updated allergy list. Please review each documented allergy for additional clarification and justification.  Allergies  Reviewed by Marko Boyer RN on 2024   No Known Allergies         Patient accepts M2B at discharge. Pharmacy has been updated to [facility].    Sources:    -patient interview  -outpatient pharmacy dispense history  -Care Everywhere medication lists  -OARRS    Below are additional concerns with the patient's PTA list:    -patient reports she has not been taking prozac, gabapentin, or zyprexa due to needing refill  -patient reports hydrochlorothiazide switched to spironolactone  -hydralazine 50mg-  last prescribed/filled for 1 tablet 3 times a day, patient only taking 1 tablet daily      González Tomlin PharmD  Transitions of Care Pharmacist  [facility]  Please reach out via Secure Chat for questions, or if no response call [employer] or [employer]

## 2024-02-22 NOTE — OP NOTE
OPERATIVE NOTE     Date:  2024 OR Location: [facility] OR    Name: Pamella Rose : 1970, Age: 53 y.o., MRN: [id number], Sex: female      Surgeons   Jay Delgado MD    Resident/Fellow/Other Assistant:  Julio Carlisle MD, Mark Alejandro MD    Anesthesia: General  ASA: III  Anesthesia Staff: Anesthesiologist: Jose Raul Sauer MD  C-AA: SIMI Blanco; SIMI Talavera  Staff: Circulator: Janel Tran RN  Scrub Person: Yang Rayo; Vicky Cintron; Janneth Acosta RN      Preoperative Diagnosis:  Osteoradionecrosis mandible    Postoperative Diagnosis:  Same    Procedure:  Debridement of mandible    Findings:  A few areas of mottled appearing bone along the right inferior mandibular border and anteriorly at the genio tubercle.  This was drilled down to healthy bleeding bone.  A small 2 cm linear opening was made on the right closed with 3-0 vicryl suture.    The debridement was excisional, and involved bone only in the mandible.    Estimated Blood Loss:  Minimal    Implantables/Drains:  10 round    Complications:  None    Description of Procedure:  This patient is a 54 yo woman who had a large right maxillary squamous cell carcinoma, she underwent a maxillectomy, scapula free flap, bilateral neck dissection and subsequent adjuvant  chemoradiation. 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 Now returns today for debridement of the mandible. I was prepared to do a fibular free flap if he needed.    The patient was met in the preoperative area and informed consent was confirmed after discussing the risks, benefits, and alternatives of the procedure. The patient was then brought to the operating room and transferred to the table. A preoperative huddle was performed, confirming the correct patient, procedure, laterality, and allergies. The patient was induced under general anesthesia  and turned towards the surgical team.     We began by marking out an incision in the prior neck dissection area. We extended it upwards on the right through the prior area of drainage. We then dissected with a bovie through the subcutaneous tissue. We raised a skin flap up to the mandible. This was quite difficult as there was significant scar. We brought in a 30 degree endoscope to help visualize the anterior limit under the skin flap.    Along the right inferior border there was a small area of mottled bone. This was debrided with a rondeur and sent for culture. We then released the digastric muscle and found the area of the tubercle to be mottled. There was no pus or active infection. This area was drilled out with a pineapple americo to healthy bleeding bone. We then identified a small opening along the right floor of mouth mandible junction. This was closed with 3-0 vicryl horizontal mattress.    The neck was irrigated. A 10 round drain was placed. The incision was closed in layers using 3-0 vicryl and 4-0 plain gut.    Dr. Delgado was present for the critical portions of the procedure.

## 2024-02-22 NOTE — ANESTHESIA PROCEDURE NOTES
Peripheral IV  Date/Time: 2/22/2024 7:28 AM  Inserted by: SIMI Blanco    Placement  Needle size: 22 G  Laterality: right  Location: hand  Local anesthetic: none  Site prep: alcohol  Technique: anatomical landmarks  Attempts: 2  Difficult Venous Access: Yes  Unsuccessful Attempt Location(s): right hand

## 2024-02-22 NOTE — HOSPITAL COURSE
Pamella Rose is a 53 y.o. female with mandibular ORN, who presented for mandibular debridement by Dr Delgado on 2/22/24. Patient had an uncomplicated surgical course. Patient recovered in PACU and was transferred to [facility] for post-operative care.  Drain was monitored and once appropriate output recorded over 24hrs it was removed.   Patient post-operative course was uncomplicated.  On day of discharge, post-operative pain was well controlled with enteral pain medication, breathing on room air, and voiding spontaneously ambulating well. Follow-up arranged.

## 2024-02-22 NOTE — ANESTHESIA PREPROCEDURE EVALUATION
Patient: Pamella Rose    Procedure Information       Date/Time: 02/22/24 0715    Procedures:       Creation Tracheostomy      Exploration Vessel Head/Neck (Bilateral)      Osteotomy Mandible (Bilateral)      Creation Osteocutaneous Flap Lower Extremity (Left)      Excision Split Thickness Skin Graft Head/Neck (Bilateral)    Location: [facility] OR 03 / Virtual [facility] OR    Surgeons: Jay Delgado MD            Relevant Problems   Anesthesia (within normal limits)      Cardiovascular   (+) Chest pain   (+) Hypertension      Endocrine   (+) Hypothyroidism   (+) Thyroid carcinoma (CMS/HCC)      GI   (+) Gastroesophageal reflux disease      /Renal   (+) CKD (chronic kidney disease) stage 3, GFR 30-59 ml/min (CMS/HCC)   (+) Hypertensive renal disease      Neuro/Psych   (+) Anxiety disorder   (+) Lumbar radiculopathy   (+) Major depressive disorder, single episode, moderate (CMS/HCC)      GI/Hepatic (within normal limits)      Hematology (within normal limits)       Clinical information reviewed:   Tobacco  Allergies    Med Hx  Surg Hx   Fam Hx  Soc Hx        NPO Detail:  NPO/Void Status  Carbohydrate Drink Given Prior to Surgery? : N  Date of Last Liquid: 02/22/24  Time of Last Liquid: 0000  Date of Last Solid: 02/22/24  Time of Last Solid: 0000  Last Intake Type: Clear fluids         Physical Exam    Airway  Mallampati: IV  TM distance: >3 FB  Neck ROM: limited  Comments: Very limited mouth opening   Cardiovascular   Rhythm: regular  Rate: normal     Dental    Pulmonary   Comments: Clear with some soft upper airway sounds   Abdominal            Anesthesia Plan    History of general anesthesia?: yes  History of complications of general anesthesia?: no    ASA 3     general     intravenous induction   Anesthetic plan and risks discussed with patient.    Plan discussed with CAA.

## 2024-02-22 NOTE — ANESTHESIA PROCEDURE NOTES
Arterial Line:    Date/Time: 2/22/2024 8:30 AM    Staffing  Performed: SIMI   Authorized by: Jose Raul Sauer MD    Performed by: SIMI Blanco    An arterial line was placed. Procedure performed using ultrasound guidance.in the OR for the following indication(s): continuous blood pressure monitoring.    A 20 gauge (size), 1 and 3/4 inch (length), Angiocath (type) catheter was placed into the Right radial artery, secured by Tegaderm,   Seldinger technique not used.  Events:  patient tolerated procedure well with no complications.      Additional notes:  First attempt by MSA-1 student, second by SIMI with USN,  artery more lateral than expected

## 2024-02-22 NOTE — ANESTHESIA POSTPROCEDURE EVALUATION
Patient: Pamella Rose    Procedure Summary       Date: 02/22/24 Room / Location: [facility] OR 03 / Virtual [facility]    Anesthesia Start: 0730 Anesthesia Stop: 1124    Procedure: Osteotomy Mandible/ mandible debridement (Bilateral) Diagnosis:       Osteoradionecrosis of jaw      (Osteoradionecrosis of jaw [M27.2, Y84.2])    Surgeons: Jay Delgado MD Responsible Provider: Jose Raul Sauer MD    Anesthesia Type: general ASA Status: 3            Anesthesia Type: general    Vitals Value Taken Time   /103 02/22/24 1130   Temp See RN notes 02/22/24 1135   Pulse 92 02/22/24 1132   Resp 19 02/22/24 1132   SpO2 96 % 02/22/24 1132   Vitals shown include unvalidated device data.    Anesthesia Post Evaluation    Patient location during evaluation: PACU  Patient participation: complete - patient participated (does not speak, but is await and appears distressed)  Level of consciousness: awake  Pain management: inadequate  Airway patency: patent  Cardiovascular status: acceptable  Respiratory status: acceptable  Hydration status: acceptable  Postoperative Nausea and Vomiting: none        No notable events documented.

## 2024-02-23 ENCOUNTER — PHARMACY VISIT (OUTPATIENT)
Dept: PHARMACY | Facility: CLINIC | Age: 54
End: 2024-02-23
Payer: MEDICAID

## 2024-02-23 LAB
ALBUMIN SERPL BCP-MCNC: 3.8 G/DL (ref 3.4–5)
ANION GAP SERPL CALC-SCNC: 13 MMOL/L (ref 10–20)
BUN SERPL-MCNC: 28 MG/DL (ref 6–23)
CALCIUM SERPL-MCNC: 9.6 MG/DL (ref 8.6–10.6)
CHLORIDE SERPL-SCNC: 106 MMOL/L (ref 98–107)
CO2 SERPL-SCNC: 25 MMOL/L (ref 21–32)
CREAT SERPL-MCNC: 1.2 MG/DL (ref 0.5–1.05)
EGFRCR SERPLBLD CKD-EPI 2021: 54 ML/MIN/1.73M*2
ERYTHROCYTE [DISTWIDTH] IN BLOOD BY AUTOMATED COUNT: 15.9 % (ref 11.5–14.5)
GLUCOSE BLD MANUAL STRIP-MCNC: 117 MG/DL (ref 74–99)
GLUCOSE BLD MANUAL STRIP-MCNC: 141 MG/DL (ref 74–99)
GLUCOSE BLD MANUAL STRIP-MCNC: 160 MG/DL (ref 74–99)
GLUCOSE SERPL-MCNC: 167 MG/DL (ref 74–99)
HCT VFR BLD AUTO: 36 % (ref 36–46)
HGB BLD-MCNC: 11.2 G/DL (ref 12–16)
MAGNESIUM SERPL-MCNC: 2.19 MG/DL (ref 1.6–2.4)
MCH RBC QN AUTO: 28.9 PG (ref 26–34)
MCHC RBC AUTO-ENTMCNC: 31.1 G/DL (ref 32–36)
MCV RBC AUTO: 93 FL (ref 80–100)
NRBC BLD-RTO: 0 /100 WBCS (ref 0–0)
PHOSPHATE SERPL-MCNC: 3 MG/DL (ref 2.5–4.9)
PLATELET # BLD AUTO: 130 X10*3/UL (ref 150–450)
POTASSIUM SERPL-SCNC: 4.6 MMOL/L (ref 3.5–5.3)
RBC # BLD AUTO: 3.88 X10*6/UL (ref 4–5.2)
SODIUM SERPL-SCNC: 139 MMOL/L (ref 136–145)
WBC # BLD AUTO: 7.5 X10*3/UL (ref 4.4–11.3)

## 2024-02-23 PROCEDURE — 36415 COLL VENOUS BLD VENIPUNCTURE: CPT

## 2024-02-23 PROCEDURE — 2500000004 HC RX 250 GENERAL PHARMACY W/ HCPCS (ALT 636 FOR OP/ED)

## 2024-02-23 PROCEDURE — 2500000001 HC RX 250 WO HCPCS SELF ADMINISTERED DRUGS (ALT 637 FOR MEDICARE OP)

## 2024-02-23 PROCEDURE — 1170000001 HC PRIVATE ONCOLOGY ROOM DAILY

## 2024-02-23 PROCEDURE — 99232 SBSQ HOSP IP/OBS MODERATE 35: CPT | Performed by: NURSE PRACTITIONER

## 2024-02-23 PROCEDURE — 83735 ASSAY OF MAGNESIUM: CPT

## 2024-02-23 PROCEDURE — 82435 ASSAY OF BLOOD CHLORIDE: CPT

## 2024-02-23 PROCEDURE — 2500000002 HC RX 250 W HCPCS SELF ADMINISTERED DRUGS (ALT 637 FOR MEDICARE OP, ALT 636 FOR OP/ED)

## 2024-02-23 PROCEDURE — 82947 ASSAY GLUCOSE BLOOD QUANT: CPT

## 2024-02-23 PROCEDURE — 85027 COMPLETE CBC AUTOMATED: CPT

## 2024-02-23 RX ORDER — LISINOPRIL 20 MG/1
40 TABLET ORAL DAILY
Status: DISCONTINUED | OUTPATIENT
Start: 2024-02-23 | End: 2024-02-25 | Stop reason: HOSPADM

## 2024-02-23 RX ORDER — AMLODIPINE BESYLATE 10 MG/1
10 TABLET ORAL DAILY
Status: DISCONTINUED | OUTPATIENT
Start: 2024-02-23 | End: 2024-02-25 | Stop reason: HOSPADM

## 2024-02-23 RX ADMIN — AMLODIPINE BESYLATE 10 MG: 10 TABLET ORAL at 17:36

## 2024-02-23 RX ADMIN — CYCLOBENZAPRINE 5 MG: 10 TABLET, FILM COATED ORAL at 09:58

## 2024-02-23 RX ADMIN — CYCLOBENZAPRINE 5 MG: 10 TABLET, FILM COATED ORAL at 15:48

## 2024-02-23 RX ADMIN — METOPROLOL TARTRATE 25 MG: 25 TABLET, FILM COATED ORAL at 20:17

## 2024-02-23 RX ADMIN — OXYCODONE HYDROCHLORIDE 5 MG: 5 SOLUTION ORAL at 21:31

## 2024-02-23 RX ADMIN — LISINOPRIL 40 MG: 20 TABLET ORAL at 17:36

## 2024-02-23 RX ADMIN — SPIRONOLACTONE 25 MG: 25 TABLET, FILM COATED ORAL at 09:58

## 2024-02-23 RX ADMIN — METOPROLOL TARTRATE 25 MG: 25 TABLET, FILM COATED ORAL at 09:58

## 2024-02-23 RX ADMIN — OXYCODONE HYDROCHLORIDE 5 MG: 5 SOLUTION ORAL at 05:08

## 2024-02-23 RX ADMIN — ACETAMINOPHEN 1000 MG: 650 SOLUTION ORAL at 17:37

## 2024-02-23 RX ADMIN — FAMOTIDINE 20 MG: 20 TABLET, FILM COATED ORAL at 09:58

## 2024-02-23 RX ADMIN — BACITRACIN 1 APPLICATION: 500 OINTMENT TOPICAL at 15:49

## 2024-02-23 RX ADMIN — OXYCODONE HYDROCHLORIDE 5 MG: 5 SOLUTION ORAL at 09:56

## 2024-02-23 RX ADMIN — CHLORHEXIDINE GLUCONATE 15 ML: 1.2 SOLUTION ORAL at 15:48

## 2024-02-23 RX ADMIN — CHLORHEXIDINE GLUCONATE 15 ML: 1.2 SOLUTION ORAL at 20:17

## 2024-02-23 RX ADMIN — ACETAMINOPHEN 1000 MG: 650 SOLUTION ORAL at 09:56

## 2024-02-23 RX ADMIN — BACITRACIN 1 APPLICATION: 500 OINTMENT TOPICAL at 20:17

## 2024-02-23 RX ADMIN — GABAPENTIN 250 MG: 250 SOLUTION ORAL at 09:56

## 2024-02-23 RX ADMIN — HYDRALAZINE HYDROCHLORIDE 50 MG: 25 TABLET ORAL at 09:58

## 2024-02-23 RX ADMIN — CYCLOBENZAPRINE 5 MG: 10 TABLET, FILM COATED ORAL at 20:17

## 2024-02-23 RX ADMIN — ACETAMINOPHEN 1000 MG: 650 SOLUTION ORAL at 02:00

## 2024-02-23 RX ADMIN — SODIUM CHLORIDE 100 ML/HR: 9 INJECTION, SOLUTION INTRAVENOUS at 05:11

## 2024-02-23 RX ADMIN — SODIUM CHLORIDE 100 ML/HR: 9 INJECTION, SOLUTION INTRAVENOUS at 10:00

## 2024-02-23 RX ADMIN — CHLORHEXIDINE GLUCONATE 15 ML: 1.2 SOLUTION ORAL at 09:57

## 2024-02-23 RX ADMIN — TERAZOSIN HYDROCHLORIDE 5 MG: 1 CAPSULE ORAL at 20:17

## 2024-02-23 RX ADMIN — SENNOSIDES 17.2 MG: 8.6 TABLET, FILM COATED ORAL at 20:17

## 2024-02-23 RX ADMIN — ENOXAPARIN SODIUM 40 MG: 100 INJECTION SUBCUTANEOUS at 15:48

## 2024-02-23 RX ADMIN — BACITRACIN 1 APPLICATION: 500 OINTMENT TOPICAL at 09:59

## 2024-02-23 RX ADMIN — ROSUVASTATIN 10 MG: 10 TABLET, FILM COATED ORAL at 09:56

## 2024-02-23 RX ADMIN — POLYETHYLENE GLYCOL 3350 17 G: 17 POWDER, FOR SOLUTION ORAL at 09:57

## 2024-02-23 RX ADMIN — FLUOXETINE 20 MG: 20 TABLET, FILM COATED ORAL at 09:00

## 2024-02-23 RX ADMIN — LEVOTHYROXINE SODIUM 112 MCG: 0.11 TABLET ORAL at 09:58

## 2024-02-23 ASSESSMENT — COGNITIVE AND FUNCTIONAL STATUS - GENERAL
MOBILITY SCORE: 21
DRESSING REGULAR LOWER BODY CLOTHING: A LITTLE
HELP NEEDED FOR BATHING: A LITTLE
DRESSING REGULAR UPPER BODY CLOTHING: A LITTLE
DAILY ACTIVITIY SCORE: 21
STANDING UP FROM CHAIR USING ARMS: A LITTLE
CLIMB 3 TO 5 STEPS WITH RAILING: A LITTLE
WALKING IN HOSPITAL ROOM: A LITTLE

## 2024-02-23 ASSESSMENT — PAIN SCALES - GENERAL
PAINLEVEL_OUTOF10: 2
PAINLEVEL_OUTOF10: 6
PAINLEVEL_OUTOF10: 6
PAINLEVEL_OUTOF10: 0 - NO PAIN

## 2024-02-23 ASSESSMENT — PAIN - FUNCTIONAL ASSESSMENT: PAIN_FUNCTIONAL_ASSESSMENT: 0-10

## 2024-02-23 NOTE — CONSULTS
"Nutrition Initial Assessment:   Nutrition Assessment    Reason for Assessment: Tube feeding recommendations    Patient is a 53 y.o. female with hx of R maxillary cancer s/p resection with scapular free flap, chemoradiation, & multiple surgical revisions. Now presenting for ORN of the jaw.    Taken to OR (2/22) now s/p surgical debridement.      Pts home TF regimen of Isosource 1.5 bolus @ 480mL TID ordered this AM.    Nutrition History:  Energy Intake: Good > 75 % (via home TF regimen)  Food and Nutrient History: Pt sleeping soundly at time of visit. RDN called pts name x2, however, pt did not wake. Unable to obtain further nutritional history at this time. Per previous RDN assessment on (11/2/23), refused reduction in home TF regimen despite weight gain d/t feeling hungry. Continues on home regimen at this time.  Vitamin/Herbal Supplement Use: Unable to assess  Food Allergies/Intolerances:  None  GI Symptoms:  Unable to assess  Oral Problems: Swallowing difficulty     Anthropometrics:  Height: 154.9 cm (5' 1\")   Weight: 82.1 kg (181 lb)   BMI (Calculated): 34.22  IBW/kg (Dietitian Calculated): 47.7 kg  Percent of IBW: 172 %     Weight History:   Wt Readings from Last 15 Encounters:   02/22/24 82.1 kg (181 lb) --> Admit wt   02/08/24 79.6 kg (175 lb 7.8 oz)   01/12/24 78.7 kg (173 lb 6.4 oz)   11/24/23 77.2 kg (170 lb 1.6 oz)   10/18/23 80 kg (176 lb 4.8 oz)   08/25/23 75 kg (165 lb 6.4 oz)   06/16/23 89 kg (196 lb 5 oz)   03/16/23 72.6 kg (160 lb 0.9 oz)   02/24/23 68.4 kg (150 lb 12.7 oz)     Weight Change %:  Weight History / % Weight Change: 4.9 kg weight gain x 3 months ; Steady weight gain x 1 year  Significant Weight Loss: No    Nutrition Focused Physical Exam Findings:  defer: Unable to wake pt - visually well nourished    Edema:  Edema: none  Physical Findings:  Skin:  (Neck incision)    Nutrition Significant Labs:  BMP Trend:   Results from last 7 days   Lab Units 02/23/24  0649   GLUCOSE mg/dL 167* "   CALCIUM mg/dL 9.6   SODIUM mmol/L 139   POTASSIUM mmol/L 4.6   CO2 mmol/L 25   CHLORIDE mmol/L 106   BUN mg/dL 28*   CREATININE mg/dL 1.20*      Nutrition Specific Medications:  Scheduled medications  insulin lispro, 0-5 Units, subcutaneous, q4h  levothyroxine, 112 mcg, g-tube, Daily before breakfast  polyethylene glycol, 17 g, oral, Daily  sennosides, 2 tablet, g-tube, Nightly  spironolactone, 25 mg, g-tube, Daily    Continuous medications  sodium chloride 0.9%, 100 mL/hr, Last Rate: 100 mL/hr (02/23/24 1000)    I/O:   No recorded BM since admission.    Dietary Orders (From admission, onward)       Start     Ordered    02/22/24 1514  Enteral feeding with NPO Isosource 1.5; 480; 3x daily; Water; Tap water (60 mL pre and post feed with additional 180 mL BID)  Diet effective now        Question Answer Comment   Tube feeding formula: Isosource 1.5    Tube feeding bolus (mL): 480    Tube feeding bolus frequency: 3x daily    Flush type: Water    Water type: Tap water 60 mL pre and post feed with additional 180 mL BID       02/22/24 1514                Estimated Needs:   Total Energy Estimated Needs (kCal):  (1509-8224)  Method for Estimating Needs: MSJ (REE: 1357) x 1.3-1.4  Total Protein Estimated Needs (g):  (70+)  Method for Estimating Needs: ~1.5 g/kg x IBW  Total Fluid Estimated Needs (mL):  (8916-6234)  Method for Estimating Needs: 1mL/kcal or per team        Nutrition Diagnosis   Malnutrition Diagnosis  Patient has Malnutrition Diagnosis: No    Nutrition Diagnosis  Patient has Nutrition Diagnosis: Yes  Diagnosis Status (1): New  Nutrition Diagnosis 1: Excessive energy intake  Related to (1): TF meeting >100% EENs  As Evidenced by (1): 17% weight gain x 1 year       Nutrition Interventions/Recommendations         Nutrition Prescription:  Continue current TF as ordered:  Isosource 1.5 bolus @ 480mL TID (~6 cans per day)  FWF: 60mL pre/post feed + additional 150mL BID  Provides: 1440mL, 2160 kcals, 98g protein, &  1880mL water    Please note, current TF regimen exceeds pts nutritional demands as evidenced by continued weight gain. However, pt has historically declined a reduction in feeds. Will continue as ordered for now.        Nutrition Interventions:   Interventions: Enteral intake  Goal: Continue to tolerate home TF regimen    Nutrition Monitoring and Evaluation   Food/Nutrient Related History Monitoring  Monitoring and Evaluation Plan: Enteral and parenteral nutrition intake  Enteral and Parenteral Nutrition Intake: Enteral nutrition intake  Criteria: Meet >75% EENs    Time Spent/Follow-up Reminder:   Time Spent (min): 45 minutes  Last Date of Nutrition Visit: 02/23/24  Nutrition Follow-Up Needed?: Dietitian to reassess per policy  Follow up Comment: TF

## 2024-02-23 NOTE — PROGRESS NOTES
"Pamella Rose is a 53 y.o. female on day 1 of admission presenting with Osteoradionecrosis of jaw.     Subjective   Patient did well overnight. Neither patient or nurse with concerns.     Objective   Physical Exam  Vitals reviewed in EMR  Gen: NAD, AOx3, resting comfortably in bed  Eyes: EOMI, sclera clear, PERRL  Ears: Normal external ears bilaterally  Nose: No rhinorrhea; anterior nares clear  Oral Cavity: Intra oral sutures  Head: normocephalic, atraumatic  Neck: Incision well approximated, c/d/I, no active bleeding, no signs of fluid collection   Resp: Comfortably breathing on room air  Cards: No clubbing/edema in hands.  Gastro: Soft, non-distended,   :  Voids  Extremities: Moves all extremities  Psych: Appropriate mood and affect  Drains: All drains in place and holding suction with serosanguinous drainage (stripped)     Last Recorded Vitals  Blood pressure 160/89, pulse 100, temperature 36.7 °C (98.1 °F), temperature source Temporal, resp. rate 18, height 1.549 m (5' 1\"), weight 82.1 kg (181 lb), SpO2 93 %.  Intake/Output last 3 Shifts:  I/O last 3 completed shifts:  In: 3441.7 (41.9 mL/kg) [I.V.:2096.7 (25.5 mL/kg); NG/GT:1345]  Out: 247.5 (3 mL/kg) [Urine:150 (0.1 mL/kg/hr); Drains:47.5; Blood:50]  Weight: 82.1 kg     Relevant Results  Results for orders placed or performed during the hospital encounter of 02/22/24 (from the past 24 hour(s))   POCT GLUCOSE   Result Value Ref Range    POCT Glucose 207 (H) 74 - 99 mg/dL   POCT GLUCOSE   Result Value Ref Range    POCT Glucose 232 (H) 74 - 99 mg/dL   CBC   Result Value Ref Range    WBC 7.5 4.4 - 11.3 x10*3/uL    nRBC 0.0 0.0 - 0.0 /100 WBCs    RBC 3.88 (L) 4.00 - 5.20 x10*6/uL    Hemoglobin 11.2 (L) 12.0 - 16.0 g/dL    Hematocrit 36.0 36.0 - 46.0 %    MCV 93 80 - 100 fL    MCH 28.9 26.0 - 34.0 pg    MCHC 31.1 (L) 32.0 - 36.0 g/dL    RDW 15.9 (H) 11.5 - 14.5 %    Platelets 130 (L) 150 - 450 x10*3/uL   Renal Function Panel   Result Value Ref Range    Glucose " 167 (H) 74 - 99 mg/dL    Sodium 139 136 - 145 mmol/L    Potassium 4.6 3.5 - 5.3 mmol/L    Chloride 106 98 - 107 mmol/L    Bicarbonate 25 21 - 32 mmol/L    Anion Gap 13 10 - 20 mmol/L    Urea Nitrogen 28 (H) 6 - 23 mg/dL    Creatinine 1.20 (H) 0.50 - 1.05 mg/dL    eGFR 54 (L) >60 mL/min/1.73m*2    Calcium 9.6 8.6 - 10.6 mg/dL    Phosphorus 3.0 2.5 - 4.9 mg/dL    Albumin 3.8 3.4 - 5.0 g/dL   Magnesium   Result Value Ref Range    Magnesium 2.19 1.60 - 2.40 mg/dL   POCT GLUCOSE   Result Value Ref Range    POCT Glucose 160 (H) 74 - 99 mg/dL      Scheduled medications  acetaminophen, 1,000 mg, g-tube, q8h  bacitracin, , Topical, TID  chlorhexidine, 15 mL, Mouth/Throat, TID  cyclobenzaprine, 5 mg, g-tube, TID  enoxaparin, 40 mg, subcutaneous, q24h  famotidine, 20 mg, g-tube, Daily  FLUoxetine, 20 mg, g-tube, Daily  gabapentin, 250 mg, g-tube, Daily  hydrALAZINE, 50 mg, g-tube, Daily  insulin lispro, 0-5 Units, subcutaneous, q4h  levothyroxine, 112 mcg, g-tube, Daily before breakfast  metoprolol tartrate, 25 mg, g-tube, BID  polyethylene glycol, 17 g, oral, Daily  rosuvastatin, 10 mg, g-tube, Daily  sennosides, 2 tablet, g-tube, Nightly  spironolactone, 25 mg, g-tube, Daily  terazosin, 5 mg, g-tube, Nightly  [START ON 2/24/2024] white petrolatum, 1 Application, Topical, TID      Continuous medications  sodium chloride 0.9%, 100 mL/hr, Last Rate: 100 mL/hr (02/23/24 1000)      PRN medications  PRN medications: dextrose 10 % in water (D10W), dextrose, glucagon, naloxone, ondansetron, oxyCODONE     Assessment/Plan   Principal Problem:    Osteoradionecrosis of jaw  Active Problems:    Gastroesophageal reflux disease    Assessment:  53 years old female with ORN status post transcervical mandibular debribement  small fistula to FOM closed with vicryl on 2/22/2024 by Dr. Delgado.    Plan:  -Drains: Monitor output  -Analgesia:  Scheduled Acetaminophen  -FEN: NPO, restarted home TF regimen; monitor and replete electrolytes as  needed  -Pulm: wean oxygen to room air, IS  -ID: No Interventtion  -Cardiac: Vitals Q4hr   -Endo: ISS  -GI: Bowel regimen, PPI,  and PRN anti-emetic  -: voids  -Steroids/Special:   -Embolic PPx: SQH, SCDs while in bed  -Dispo: Discharge planning for POD 2 vs 3 home with home care vs skilled nursing facility    All plans discussed with attendings after morning rounds.        I spent 35 minutes in the professional and overall care of this patient.    Eva Zee APRN, CNP  Certified Family Nurse Practitioner  Nurse Practitioner III  Department of Otolaryngology: Head & Neck Surgery  Personal Pager [phone]  ENT Team  Head and Neck Phone: [phone]

## 2024-02-23 NOTE — CARE PLAN
The patient's goals for the shift include      The clinical goals for the shift include pt will get adequate rest throughout the shift    Over the shift, the patient did not make progress toward the following goals. Barriers to progression include increased pain. Recommendations to address these barriers include utilizing medications.

## 2024-02-23 NOTE — NURSING NOTE
Patient's discharge medications placed in omnicell in pt specific drawer. Per pharmacy, ok to keep them in omnicell until pt is ready to be discharged.

## 2024-02-24 LAB
ALBUMIN SERPL BCP-MCNC: 3.9 G/DL (ref 3.4–5)
ANION GAP SERPL CALC-SCNC: 12 MMOL/L (ref 10–20)
BUN SERPL-MCNC: 25 MG/DL (ref 6–23)
CALCIUM SERPL-MCNC: 10 MG/DL (ref 8.6–10.6)
CHLORIDE SERPL-SCNC: 106 MMOL/L (ref 98–107)
CO2 SERPL-SCNC: 29 MMOL/L (ref 21–32)
CREAT SERPL-MCNC: 1.34 MG/DL (ref 0.5–1.05)
EGFRCR SERPLBLD CKD-EPI 2021: 47 ML/MIN/1.73M*2
ERYTHROCYTE [DISTWIDTH] IN BLOOD BY AUTOMATED COUNT: 16 % (ref 11.5–14.5)
GLUCOSE BLD MANUAL STRIP-MCNC: 137 MG/DL (ref 74–99)
GLUCOSE BLD MANUAL STRIP-MCNC: 159 MG/DL (ref 74–99)
GLUCOSE SERPL-MCNC: 128 MG/DL (ref 74–99)
HCT VFR BLD AUTO: 38.7 % (ref 36–46)
HGB BLD-MCNC: 12 G/DL (ref 12–16)
MCH RBC QN AUTO: 29.1 PG (ref 26–34)
MCHC RBC AUTO-ENTMCNC: 31 G/DL (ref 32–36)
MCV RBC AUTO: 94 FL (ref 80–100)
NRBC BLD-RTO: 0 /100 WBCS (ref 0–0)
PHOSPHATE SERPL-MCNC: 3.5 MG/DL (ref 2.5–4.9)
PLATELET # BLD AUTO: 127 X10*3/UL (ref 150–450)
POTASSIUM SERPL-SCNC: 4 MMOL/L (ref 3.5–5.3)
RBC # BLD AUTO: 4.13 X10*6/UL (ref 4–5.2)
SODIUM SERPL-SCNC: 143 MMOL/L (ref 136–145)
WBC # BLD AUTO: 5.5 X10*3/UL (ref 4.4–11.3)

## 2024-02-24 PROCEDURE — 2500000002 HC RX 250 W HCPCS SELF ADMINISTERED DRUGS (ALT 637 FOR MEDICARE OP, ALT 636 FOR OP/ED)

## 2024-02-24 PROCEDURE — 82947 ASSAY GLUCOSE BLOOD QUANT: CPT

## 2024-02-24 PROCEDURE — 2500000004 HC RX 250 GENERAL PHARMACY W/ HCPCS (ALT 636 FOR OP/ED)

## 2024-02-24 PROCEDURE — 1170000001 HC PRIVATE ONCOLOGY ROOM DAILY

## 2024-02-24 PROCEDURE — 80069 RENAL FUNCTION PANEL: CPT

## 2024-02-24 PROCEDURE — 36415 COLL VENOUS BLD VENIPUNCTURE: CPT

## 2024-02-24 PROCEDURE — 85027 COMPLETE CBC AUTOMATED: CPT

## 2024-02-24 PROCEDURE — 2500000001 HC RX 250 WO HCPCS SELF ADMINISTERED DRUGS (ALT 637 FOR MEDICARE OP)

## 2024-02-24 RX ORDER — FLUOXETINE HYDROCHLORIDE 20 MG/1
20 CAPSULE ORAL DAILY
Status: DISCONTINUED | OUTPATIENT
Start: 2024-02-24 | End: 2024-02-25 | Stop reason: HOSPADM

## 2024-02-24 RX ADMIN — ENOXAPARIN SODIUM 40 MG: 100 INJECTION SUBCUTANEOUS at 15:26

## 2024-02-24 RX ADMIN — ACETAMINOPHEN 1000 MG: 650 SOLUTION ORAL at 10:11

## 2024-02-24 RX ADMIN — GABAPENTIN 250 MG: 250 SOLUTION ORAL at 10:11

## 2024-02-24 RX ADMIN — METOPROLOL TARTRATE 25 MG: 25 TABLET, FILM COATED ORAL at 21:38

## 2024-02-24 RX ADMIN — CHLORHEXIDINE GLUCONATE 15 ML: 1.2 SOLUTION ORAL at 10:11

## 2024-02-24 RX ADMIN — TERAZOSIN HYDROCHLORIDE 5 MG: 1 CAPSULE ORAL at 21:38

## 2024-02-24 RX ADMIN — LEVOTHYROXINE SODIUM 112 MCG: 0.11 TABLET ORAL at 10:34

## 2024-02-24 RX ADMIN — CYCLOBENZAPRINE 5 MG: 10 TABLET, FILM COATED ORAL at 10:12

## 2024-02-24 RX ADMIN — FLUOXETINE 20 MG: 20 CAPSULE ORAL at 10:11

## 2024-02-24 RX ADMIN — AMLODIPINE BESYLATE 10 MG: 10 TABLET ORAL at 10:12

## 2024-02-24 RX ADMIN — CHLORHEXIDINE GLUCONATE 15 ML: 1.2 SOLUTION ORAL at 15:26

## 2024-02-24 RX ADMIN — OXYCODONE HYDROCHLORIDE 5 MG: 5 SOLUTION ORAL at 21:38

## 2024-02-24 RX ADMIN — INSULIN LISPRO 1 UNITS: 100 INJECTION, SOLUTION INTRAVENOUS; SUBCUTANEOUS at 10:34

## 2024-02-24 RX ADMIN — LISINOPRIL 40 MG: 20 TABLET ORAL at 10:12

## 2024-02-24 RX ADMIN — PETROLATUM 1 APPLICATION: 1 OINTMENT TOPICAL at 15:32

## 2024-02-24 RX ADMIN — CYCLOBENZAPRINE 5 MG: 10 TABLET, FILM COATED ORAL at 21:38

## 2024-02-24 RX ADMIN — CHLORHEXIDINE GLUCONATE 15 ML: 1.2 SOLUTION ORAL at 21:38

## 2024-02-24 RX ADMIN — INSULIN LISPRO 1 UNITS: 100 INJECTION, SOLUTION INTRAVENOUS; SUBCUTANEOUS at 19:07

## 2024-02-24 RX ADMIN — ROSUVASTATIN 10 MG: 10 TABLET, FILM COATED ORAL at 10:11

## 2024-02-24 RX ADMIN — BACITRACIN 1 APPLICATION: 500 OINTMENT TOPICAL at 10:11

## 2024-02-24 RX ADMIN — PETROLATUM 1 APPLICATION: 1 OINTMENT TOPICAL at 10:20

## 2024-02-24 RX ADMIN — FAMOTIDINE 20 MG: 20 TABLET, FILM COATED ORAL at 10:12

## 2024-02-24 RX ADMIN — HYDRALAZINE HYDROCHLORIDE 50 MG: 25 TABLET ORAL at 10:12

## 2024-02-24 RX ADMIN — SENNOSIDES 17.2 MG: 8.6 TABLET, FILM COATED ORAL at 21:38

## 2024-02-24 RX ADMIN — METOPROLOL TARTRATE 25 MG: 25 TABLET, FILM COATED ORAL at 10:12

## 2024-02-24 RX ADMIN — CYCLOBENZAPRINE 5 MG: 10 TABLET, FILM COATED ORAL at 15:26

## 2024-02-24 RX ADMIN — SPIRONOLACTONE 25 MG: 25 TABLET, FILM COATED ORAL at 10:12

## 2024-02-24 RX ADMIN — ACETAMINOPHEN 1000 MG: 650 SOLUTION ORAL at 03:30

## 2024-02-24 RX ADMIN — OXYCODONE HYDROCHLORIDE 5 MG: 5 SOLUTION ORAL at 10:11

## 2024-02-24 RX ADMIN — ACETAMINOPHEN 1000 MG: 650 SOLUTION ORAL at 19:07

## 2024-02-24 RX ADMIN — OXYCODONE HYDROCHLORIDE 5 MG: 5 SOLUTION ORAL at 15:26

## 2024-02-24 RX ADMIN — OXYCODONE HYDROCHLORIDE 5 MG: 5 SOLUTION ORAL at 03:30

## 2024-02-24 ASSESSMENT — COGNITIVE AND FUNCTIONAL STATUS - GENERAL
STANDING UP FROM CHAIR USING ARMS: A LITTLE
WALKING IN HOSPITAL ROOM: A LITTLE
DRESSING REGULAR LOWER BODY CLOTHING: A LITTLE
DRESSING REGULAR UPPER BODY CLOTHING: A LITTLE
CLIMB 3 TO 5 STEPS WITH RAILING: A LITTLE
MOBILITY SCORE: 21
HELP NEEDED FOR BATHING: A LITTLE
DAILY ACTIVITIY SCORE: 21

## 2024-02-24 ASSESSMENT — PAIN SCALES - GENERAL
PAINLEVEL_OUTOF10: 5 - MODERATE PAIN
PAINLEVEL_OUTOF10: 3
PAINLEVEL_OUTOF10: 10 - WORST POSSIBLE PAIN
PAINLEVEL_OUTOF10: 6
PAINLEVEL_OUTOF10: 6

## 2024-02-24 NOTE — PROGRESS NOTES
"Pamella Rose is a 53 y.o. female on day 2 of admission presenting with Osteoradionecrosis of jaw.     Subjective   Patient did well overnight. Neither patient or nurse with concerns.  Possibly going home today    Objective   Physical Exam  Vitals reviewed in EMR  Gen: NAD, AOx3, resting comfortably in bed  Eyes: EOMI, sclera clear, PERRL  Ears: Normal external ears bilaterally  Nose: No rhinorrhea; anterior nares clear  Oral Cavity: Intra oral sutures  Head: normocephalic, atraumatic  Neck: Incision well approximated, c/d/I, no active bleeding, no signs of fluid collection   Resp: Comfortably breathing on room air  Cards: No clubbing/edema in hands.  Gastro: Soft, non-distended,   :  Voids  Extremities: Moves all extremities  Psych: Appropriate mood and affect  Drains: All drains in place and holding suction with serosanguinous drainage (stripped)     Last Recorded Vitals  Blood pressure 123/78, pulse 77, temperature 36.3 °C (97.4 °F), resp. rate 16, height 1.549 m (5' 1\"), weight 82.1 kg (181 lb), SpO2 92 %.  Intake/Output last 3 Shifts:  I/O last 3 completed shifts:  In: 4061.7 (49.5 mL/kg) [I.V.:2096.7 (25.5 mL/kg); NG/GT:1965]  Out: 267.5 (3.3 mL/kg) [Urine:150 (0.1 mL/kg/hr); Drains:67.5; Blood:50]  Weight: 82.1 kg     Relevant Results  Results for orders placed or performed during the hospital encounter of 02/22/24 (from the past 24 hour(s))   CBC   Result Value Ref Range    WBC 7.5 4.4 - 11.3 x10*3/uL    nRBC 0.0 0.0 - 0.0 /100 WBCs    RBC 3.88 (L) 4.00 - 5.20 x10*6/uL    Hemoglobin 11.2 (L) 12.0 - 16.0 g/dL    Hematocrit 36.0 36.0 - 46.0 %    MCV 93 80 - 100 fL    MCH 28.9 26.0 - 34.0 pg    MCHC 31.1 (L) 32.0 - 36.0 g/dL    RDW 15.9 (H) 11.5 - 14.5 %    Platelets 130 (L) 150 - 450 x10*3/uL   Renal Function Panel   Result Value Ref Range    Glucose 167 (H) 74 - 99 mg/dL    Sodium 139 136 - 145 mmol/L    Potassium 4.6 3.5 - 5.3 mmol/L    Chloride 106 98 - 107 mmol/L    Bicarbonate 25 21 - 32 mmol/L    " Anion Gap 13 10 - 20 mmol/L    Urea Nitrogen 28 (H) 6 - 23 mg/dL    Creatinine 1.20 (H) 0.50 - 1.05 mg/dL    eGFR 54 (L) >60 mL/min/1.73m*2    Calcium 9.6 8.6 - 10.6 mg/dL    Phosphorus 3.0 2.5 - 4.9 mg/dL    Albumin 3.8 3.4 - 5.0 g/dL   Magnesium   Result Value Ref Range    Magnesium 2.19 1.60 - 2.40 mg/dL   POCT GLUCOSE   Result Value Ref Range    POCT Glucose 160 (H) 74 - 99 mg/dL   POCT GLUCOSE   Result Value Ref Range    POCT Glucose 117 (H) 74 - 99 mg/dL   POCT GLUCOSE   Result Value Ref Range    POCT Glucose 141 (H) 74 - 99 mg/dL      Scheduled medications  acetaminophen, 1,000 mg, g-tube, q8h  amLODIPine, 10 mg, g-tube, Daily  bacitracin, , Topical, TID  chlorhexidine, 15 mL, Mouth/Throat, TID  cyclobenzaprine, 5 mg, g-tube, TID  enoxaparin, 40 mg, subcutaneous, q24h  famotidine, 20 mg, g-tube, Daily  FLUoxetine, 20 mg, g-tube, Daily  gabapentin, 250 mg, g-tube, Daily  hydrALAZINE, 50 mg, g-tube, Daily  insulin lispro, 0-5 Units, subcutaneous, q4h  levothyroxine, 112 mcg, g-tube, Daily before breakfast  lisinopril, 40 mg, g-tube, Daily  metoprolol tartrate, 25 mg, g-tube, BID  polyethylene glycol, 17 g, oral, Daily  rosuvastatin, 10 mg, g-tube, Daily  sennosides, 2 tablet, g-tube, Nightly  spironolactone, 25 mg, g-tube, Daily  terazosin, 5 mg, g-tube, Nightly  white petrolatum, 1 Application, Topical, TID      Continuous medications  sodium chloride 0.9%, 100 mL/hr, Last Rate: 100 mL/hr (02/23/24 1000)      PRN medications  PRN medications: dextrose 10 % in water (D10W), dextrose, glucagon, naloxone, ondansetron, oxyCODONE     Assessment/Plan   Principal Problem:    Osteoradionecrosis of jaw  Active Problems:    Gastroesophageal reflux disease    Assessment:  53 years old female with ORN status post transcervical mandibular debribement  small fistula to FOM closed with vicryl on 2/22/2024 by Dr. Delgado.    Plan:  -Drains: Monitor output; pending confirmation, may remove drain and dc home today  -Analgesia:   Scheduled Acetaminophen  -FEN: NPO, restarted home TF regimen; monitor and replete electrolytes as needed  -Pulm: wean oxygen to room air, IS  -ID: No Interventtion  -Cardiac: Vitals Q4hr   -Endo: ISS  -GI: Bowel regimen, PPI,  and PRN anti-emetic  -: voids  -Steroids/Special:   -Embolic PPx: SQH, SCDs while in bed  -Dispo: Discharge planning for POD 2 vs 3 home pending drain    All plans discussed with attendings after morning rounds.     Mark Alejandro MD - PGY1  Otolaryngology - Head & Neck Surgery  [facility]    ENT Consult pager: [phone]  ENT Peds pager: [phone]  ENT Head & Neck Surgery Phone: [phone]  ENT subspecialty team: Ana individual resident who wrote today's note  ENT Outpatient scheduling number: [phone]

## 2024-02-25 VITALS
DIASTOLIC BLOOD PRESSURE: 78 MMHG | RESPIRATION RATE: 14 BRPM | TEMPERATURE: 98.1 F | WEIGHT: 181 LBS | BODY MASS INDEX: 34.17 KG/M2 | SYSTOLIC BLOOD PRESSURE: 127 MMHG | HEIGHT: 61 IN | HEART RATE: 67 BPM | OXYGEN SATURATION: 93 %

## 2024-02-25 LAB
ALBUMIN SERPL BCP-MCNC: 3.9 G/DL (ref 3.4–5)
ANION GAP SERPL CALC-SCNC: 11 MMOL/L (ref 10–20)
BUN SERPL-MCNC: 26 MG/DL (ref 6–23)
CALCIUM SERPL-MCNC: 9.9 MG/DL (ref 8.6–10.6)
CHLORIDE SERPL-SCNC: 105 MMOL/L (ref 98–107)
CO2 SERPL-SCNC: 29 MMOL/L (ref 21–32)
CREAT SERPL-MCNC: 1.26 MG/DL (ref 0.5–1.05)
EGFRCR SERPLBLD CKD-EPI 2021: 51 ML/MIN/1.73M*2
ERYTHROCYTE [DISTWIDTH] IN BLOOD BY AUTOMATED COUNT: 15.8 % (ref 11.5–14.5)
GLUCOSE BLD MANUAL STRIP-MCNC: 132 MG/DL (ref 74–99)
GLUCOSE BLD MANUAL STRIP-MCNC: 168 MG/DL (ref 74–99)
GLUCOSE BLD MANUAL STRIP-MCNC: 178 MG/DL (ref 74–99)
GLUCOSE BLD MANUAL STRIP-MCNC: 97 MG/DL (ref 74–99)
GLUCOSE SERPL-MCNC: 109 MG/DL (ref 74–99)
HCT VFR BLD AUTO: 36.6 % (ref 36–46)
HGB BLD-MCNC: 11.6 G/DL (ref 12–16)
MCH RBC QN AUTO: 29.5 PG (ref 26–34)
MCHC RBC AUTO-ENTMCNC: 31.7 G/DL (ref 32–36)
MCV RBC AUTO: 93 FL (ref 80–100)
NRBC BLD-RTO: 0 /100 WBCS (ref 0–0)
PHOSPHATE SERPL-MCNC: 3.3 MG/DL (ref 2.5–4.9)
PLATELET # BLD AUTO: 143 X10*3/UL (ref 150–450)
POTASSIUM SERPL-SCNC: 4.1 MMOL/L (ref 3.5–5.3)
RBC # BLD AUTO: 3.93 X10*6/UL (ref 4–5.2)
SODIUM SERPL-SCNC: 141 MMOL/L (ref 136–145)
WBC # BLD AUTO: 5.4 X10*3/UL (ref 4.4–11.3)

## 2024-02-25 PROCEDURE — 82947 ASSAY GLUCOSE BLOOD QUANT: CPT

## 2024-02-25 PROCEDURE — 85027 COMPLETE CBC AUTOMATED: CPT

## 2024-02-25 PROCEDURE — 99024 POSTOP FOLLOW-UP VISIT: CPT | Performed by: OTOLARYNGOLOGY

## 2024-02-25 PROCEDURE — 80069 RENAL FUNCTION PANEL: CPT

## 2024-02-25 PROCEDURE — 2500000001 HC RX 250 WO HCPCS SELF ADMINISTERED DRUGS (ALT 637 FOR MEDICARE OP)

## 2024-02-25 PROCEDURE — 2500000004 HC RX 250 GENERAL PHARMACY W/ HCPCS (ALT 636 FOR OP/ED)

## 2024-02-25 PROCEDURE — 2500000002 HC RX 250 W HCPCS SELF ADMINISTERED DRUGS (ALT 637 FOR MEDICARE OP, ALT 636 FOR OP/ED)

## 2024-02-25 PROCEDURE — 36415 COLL VENOUS BLD VENIPUNCTURE: CPT

## 2024-02-25 RX ADMIN — CYCLOBENZAPRINE 5 MG: 10 TABLET, FILM COATED ORAL at 09:27

## 2024-02-25 RX ADMIN — METOPROLOL TARTRATE 25 MG: 25 TABLET, FILM COATED ORAL at 09:27

## 2024-02-25 RX ADMIN — FAMOTIDINE 20 MG: 20 TABLET, FILM COATED ORAL at 09:27

## 2024-02-25 RX ADMIN — HYDRALAZINE HYDROCHLORIDE 50 MG: 25 TABLET ORAL at 09:27

## 2024-02-25 RX ADMIN — LISINOPRIL 40 MG: 20 TABLET ORAL at 09:27

## 2024-02-25 RX ADMIN — ACETAMINOPHEN 1000 MG: 650 SOLUTION ORAL at 09:27

## 2024-02-25 RX ADMIN — OXYCODONE HYDROCHLORIDE 5 MG: 5 SOLUTION ORAL at 03:43

## 2024-02-25 RX ADMIN — CHLORHEXIDINE GLUCONATE 15 ML: 1.2 SOLUTION ORAL at 09:28

## 2024-02-25 RX ADMIN — OXYCODONE HYDROCHLORIDE 5 MG: 5 SOLUTION ORAL at 09:37

## 2024-02-25 RX ADMIN — FLUOXETINE 20 MG: 20 CAPSULE ORAL at 09:27

## 2024-02-25 RX ADMIN — GABAPENTIN 250 MG: 250 SOLUTION ORAL at 11:30

## 2024-02-25 RX ADMIN — PETROLATUM 1 APPLICATION: 1 OINTMENT TOPICAL at 09:00

## 2024-02-25 RX ADMIN — AMLODIPINE BESYLATE 10 MG: 10 TABLET ORAL at 09:27

## 2024-02-25 RX ADMIN — LEVOTHYROXINE SODIUM 112 MCG: 0.11 TABLET ORAL at 05:38

## 2024-02-25 RX ADMIN — POLYETHYLENE GLYCOL 3350 17 G: 17 POWDER, FOR SOLUTION ORAL at 09:29

## 2024-02-25 RX ADMIN — SPIRONOLACTONE 25 MG: 25 TABLET, FILM COATED ORAL at 09:27

## 2024-02-25 RX ADMIN — ROSUVASTATIN 10 MG: 10 TABLET, FILM COATED ORAL at 09:27

## 2024-02-25 RX ADMIN — ACETAMINOPHEN 1000 MG: 650 SOLUTION ORAL at 03:43

## 2024-02-25 ASSESSMENT — COGNITIVE AND FUNCTIONAL STATUS - GENERAL
DRESSING REGULAR LOWER BODY CLOTHING: A LITTLE
DRESSING REGULAR UPPER BODY CLOTHING: A LITTLE
HELP NEEDED FOR BATHING: A LITTLE
WALKING IN HOSPITAL ROOM: A LITTLE
MOBILITY SCORE: 21
STANDING UP FROM CHAIR USING ARMS: A LITTLE
CLIMB 3 TO 5 STEPS WITH RAILING: A LITTLE
DAILY ACTIVITIY SCORE: 21

## 2024-02-25 ASSESSMENT — PAIN SCALES - GENERAL
PAINLEVEL_OUTOF10: 6
PAINLEVEL_OUTOF10: 0 - NO PAIN
PAINLEVEL_OUTOF10: 6
PAINLEVEL_OUTOF10: 5 - MODERATE PAIN

## 2024-02-25 NOTE — NURSING NOTE
2/25/24 1300: Nursing discharge Note: Patients discharge instructions including medications,, pharmacy, follow up appointments and activities of healthy living reviewed with patient. Patient and MAR indicated that patient had home meds to be returned to patient. No [facility] med[facility]itration entry  in log. Team aware. Patient refused offer to have team contact her. Patient IV moved with the tip intact and patient discharged to home with all belongings via private vehicle

## 2024-02-25 NOTE — DISCHARGE SUMMARY
Discharge Diagnosis  Osteoradionecrosis of jaw    Issues Requiring Follow-Up  Mandibular debridement.     Test Results Pending At Discharge  Pending Labs       Order Current Status    CBC In process    Renal Function Panel In process    Surgical Pathology Exam In process    AFB Culture/Smear Preliminary result    Fungal Culture/Smear Preliminary result    Tissue/Wound Culture/Smear Preliminary result            Hospital Course  Pamella Rose is a 53 y.o. female with mandibular ORN, who presented for mandibular debridement by Dr Delgado on 2/22/24. Patient had an uncomplicated surgical course. Patient recovered in PACU and was transferred to [facility] for post-operative care.  Drain was monitored and once appropriate output recorded over 24hrs it was removed.   Patient post-operative course was uncomplicated.  On day of discharge, post-operative pain was well controlled with enteral pain medication, breathing on room air, and voiding spontaneously ambulating well. Follow-up arranged.     Pertinent Physical Exam At Time of Discharge  Physical Exam  Gen: NAD, AOx3, resting comfortably in bed  Eyes: EOMI, sclera clear, PERRL  Ears: Normal external ears bilaterally  Nose: No rhinorrhea; anterior nares clear  Oral Cavity: Intra oral sutures  Head: normocephalic, atraumatic  Neck: Incision well approximated, c/d/I, no active bleeding, no signs of fluid collection   Resp: Comfortably breathing on room air  Cards: No clubbing/edema in hands.  Gastro: Soft, non-distended,   :  Voids  Extremities: Moves all extremities  Psych: Appropriate mood and affect  Drains: left neck drain removed.   Home Medications     Medication List      START taking these medications     amoxicillin-pot clavulanate 875-125 mg tablet; Commonly known as:   Augmentin; 1 tablet (875 mg) by g-tube route 2 times a day for 21 days.   chlorhexidine 0.12 % solution; Commonly known as: Peridex; Use 15 mL in   the mouth or throat 3 times a day.   metoprolol tartrate  25 mg tablet; Commonly known as: Lopressor; 1 tablet   (25 mg) by g-tube route 2 times a day.   oxyCODONE 5 mg/5 mL solution; Commonly known as: Roxicodone; 5 mL (5 mg)   by g-tube route every 6 hours if needed for moderate pain (4 - 6) or   severe pain (7 - 10) for up to 5 days.   senna 8.6 mg tablet; Generic drug: sennosides; 2 tablets (17.2 mg) by   g-tube route once daily at bedtime for 10 days.     CHANGE how you take these medications     amLODIPine 10 mg tablet; Commonly known as: Norvasc; 1 tablet (10 mg) by   g-tube route once daily.; What changed: how to take this   cyclobenzaprine 5 mg tablet; Commonly known as: Flexeril; 1 tablet (5   mg) by g-tube route once daily at bedtime.; What changed: how to take   this, when to take this   famotidine 20 mg tablet; Commonly known as: Pepcid; 1 tablet (20 mg) by   g-tube route once daily.; What changed: how to take this   FLUoxetine 20 mg tablet; Commonly known as: PROzac; 1 tablet (20 mg) by   g-tube route once daily.; What changed: how to take this   gabapentin 250 mg/5 mL (5 mL) solution; 250 mg by g-tube route once   daily.; What changed: how to take this   hydrALAZINE 50 mg tablet; Commonly known as: Apresoline; 1 tablet (50   mg) by g-tube route 3 times a day.; What changed: how to take this   levothyroxine 112 mcg tablet; Commonly known as: Synthroid, Levoxyl; 1   tablet (112 mcg) by g-tube route once daily in the morning. Take before   meals.; What changed: how to take this   lisinopril 40 mg tablet; 1 tablet (40 mg) by g-tube route once daily.;   What changed: how to take this   metoprolol succinate XL 50 mg 24 hr tablet; Commonly known as:   Toprol-XL; Take 1 tablet (50 mg) by mouth once daily. Do not crush or   chew. HOLD UNTIL CLEARED FOR ORAL DIET; What changed: additional   instructions   rosuvastatin 10 mg tablet; Commonly known as: Crestor; 1 tablet (10 mg)   by g-tube route once daily.; What changed: how to take this   spironolactone 25 mg tablet;  Commonly known as: Aldactone; 1 tablet (25   mg) by g-tube route 1 time for 1 dose. 1 tablet DAILY (route: oral); What   changed: how much to take, how to take this, when to take this   terazosin 5 mg capsule; Commonly known as: Hytrin; 1 capsule (5 mg) by   g-tube route once daily at bedtime.; What changed: how to take this   white petrolatum 41 % ointment ointment; Commonly known as: Aquaphor;   Apply 1 Application topically 3 times a day. Do not start before February 24, 2024.; What changed: medication strength, when to take this, reasons   to take this, additional instructions     CONTINUE taking these medications     acetaminophen 160 mg/5 mL liquid; Commonly known as: Tylenol; 31.2 mL   (1,000 mg) by g-tube route every 8 hours for 14 days.     STOP taking these medications     OLANZapine 5 mg tablet; Commonly known as: ZyPREXA   omeprazole 20 mg DR capsule; Commonly known as: PriLOSEC       Outpatient Follow-Up  Future Appointments   Date Time Provider Department Center   4/16/2024  1:00 PM Rohit Boles PA-C [id number]       Johnathan Patel, DO

## 2024-02-25 NOTE — CARE PLAN
Problem: Pain  Goal: My pain/discomfort is manageable  Outcome: Progressing     Problem: Safety  Goal: Patient will be injury free during hospitalization  Outcome: Progressing  Goal: I will remain free of falls  Outcome: Progressing     Problem: Daily Care  Goal: Daily care needs are met  Outcome: Progressing     Problem: Psychosocial Needs  Goal: Demonstrates ability to cope with hospitalization/illness  Outcome: Progressing  Goal: Collaborate with me, my family, and caregiver to identify my specific goals  Outcome: Progressing     Problem: Discharge Barriers  Goal: My discharge needs are met  Outcome: Progressing     Problem: Skin  Goal: Decreased wound size/increased tissue granulation at next dressing change  Outcome: Progressing  Goal: Participates in plan/prevention/treatment measures  Outcome: Progressing  Goal: Prevent/manage excess moisture  Outcome: Progressing  Goal: Prevent/minimize sheer/friction injuries  Outcome: Progressing  Goal: Promote/optimize nutrition  Outcome: Progressing  Goal: Promote skin healing  Outcome: Progressing     Problem: Nutrition  Goal: Less than 5 days NPO/clear liquids  Outcome: Progressing  Goal: Oral intake greater than 50%  Outcome: Progressing  Goal: Oral intake greater 75%  Outcome: Progressing  Goal: Consume prescribed supplement  Outcome: Progressing  Goal: Adequate PO fluid intake  Outcome: Progressing  Goal: Nutrition support goals are met within 48 hrs  Outcome: Progressing  Goal: Nutrition support is meeting 75% of nutrient needs  Outcome: Progressing  Goal: Tube feed tolerance  Outcome: Progressing  Goal: BG  mg/dL  Outcome: Progressing  Goal: Lab values WNL  Outcome: Progressing  Goal: Electrolytes WNL  Outcome: Progressing  Goal: Promote healing  Outcome: Progressing  Goal: Maintain stable weight  Outcome: Progressing  Goal: Reduce weight from edema/fluid  Outcome: Progressing  Goal: Gradual weight gain  Outcome: Progressing  Goal: Improve ostomy  output  Outcome: Progressing   The patient's goals for the shift include      The clinical goals for the shift include pain control    .

## 2024-02-27 LAB
LABORATORY COMMENT REPORT: NORMAL
PATH REPORT.FINAL DX SPEC: NORMAL
PATH REPORT.GROSS SPEC: NORMAL
PATH REPORT.RELEVANT HX SPEC: NORMAL
PATH REPORT.TOTAL CANCER: NORMAL
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
## 2024-02-29 DIAGNOSIS — M27.2 OSTEORADIONECROSIS OF JAW: Primary | ICD-10-CM

## 2024-02-29 LAB
BACTERIA SPEC CULT: NORMAL
GRAM STN SPEC: NORMAL
GRAM STN SPEC: NORMAL

## 2024-02-29 PROCEDURE — RXMED WILLOW AMBULATORY MEDICATION CHARGE

## 2024-02-29 RX ORDER — OXYCODONE HCL 5 MG/5 ML
5 SOLUTION, ORAL ORAL EVERY 6 HOURS PRN
Qty: 140 ML | Refills: 0 | Status: SHIPPED | OUTPATIENT
Start: 2024-02-29 | End: 2024-03-08

## 2024-03-01 ENCOUNTER — PHARMACY VISIT (OUTPATIENT)
Dept: PHARMACY | Facility: CLINIC | Age: 54
End: 2024-03-01
Payer: MEDICAID

## 2024-03-08 ENCOUNTER — APPOINTMENT (OUTPATIENT)
Dept: OTOLARYNGOLOGY | Facility: HOSPITAL | Age: 54
End: 2024-03-08
Payer: COMMERCIAL

## 2024-03-11 LAB
FUNGUS SPEC CULT: NORMAL
FUNGUS SPEC FUNGUS STN: NORMAL

## 2024-03-15 ENCOUNTER — PHARMACY VISIT (OUTPATIENT)
Dept: PHARMACY | Facility: CLINIC | Age: 54
End: 2024-03-15
Payer: MEDICAID

## 2024-03-15 ENCOUNTER — OFFICE VISIT (OUTPATIENT)
Dept: OTOLARYNGOLOGY | Facility: HOSPITAL | Age: 54
End: 2024-03-15
Payer: COMMERCIAL

## 2024-03-15 VITALS — BODY MASS INDEX: 33.04 KG/M2 | WEIGHT: 175 LBS | TEMPERATURE: 98.1 F | HEIGHT: 61 IN

## 2024-03-15 DIAGNOSIS — C41.1 CANCER OF INFERIOR MAXILLA (MULTI): ICD-10-CM

## 2024-03-15 DIAGNOSIS — M27.2 OSTEORADIONECROSIS OF MANDIBLE: Primary | ICD-10-CM
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
## 2024-03-15 PROCEDURE — 1036F TOBACCO NON-USER: CPT | Performed by: OTOLARYNGOLOGY

## 2024-03-15 PROCEDURE — 99024 POSTOP FOLLOW-UP VISIT: CPT | Performed by: OTOLARYNGOLOGY

## 2024-03-15 PROCEDURE — RXMED WILLOW AMBULATORY MEDICATION CHARGE

## 2024-03-15 RX ORDER — OMEPRAZOLE 20 MG/1
20 CAPSULE, DELAYED RELEASE ORAL DAILY
Qty: 30 CAPSULE | Refills: 11 | Status: SHIPPED | OUTPATIENT
Start: 2024-03-15 | End: 2025-03-15

## 2024-03-15 NOTE — PROGRESS NOTES
ENT Follow up Visit    Chief Complaint: Neck drainage    History Of Present Illness  Pamella Rose is a 53 y.o. female presents for follow up.  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 Had to have a revision scapula due to loss of first flap. She has been dealing with ORN of the mandible and was on HBO. She was admitted with neck drainage on -23 and was last seen on 23. At that time, there was no more drainage and had not restarted her HBO.    Today she reports that she overall is doing well.  She denies any new fevers.  She denies any new drainage.  She still endorses some tightness of her right neck and overall dull discomfort of her mandible.    3-15-24 post-op: s/p debridement of mandible  transcervically. A small tear was made in the floor of mouth this was closed primarily. She is doing well.       Past Medical History  She has a past medical history of Anxiety, CKD (chronic kidney disease), Depression, Dysphagia, Encounter for electrocardiogram (2023), GERD (gastroesophageal reflux disease), History of sinus cancer, tracheostomy (), Hyperaldosteronism (CMS/HCC), Hyperlipidemia, Hypertension, Hypothyroidism, Osteoradionecrosis of mandible (2024), Proteinuria, and Thyroid cancer (CMS/[facility]).    Surgical History  She has a past surgical history that includes IR intervention venous sampling (2021); IR intervention venous sampling (2021); CT angio aorta and bilateral iliofemoral runoff w and or wo IV contrast (2023);  section, low transverse; Colonoscopy; Cardiovascular stress test (2016); echocardiogram 2 d m mode panel (2016); Other surgical history; and Other surgical history.     Social History  She reports that she quit smoking about 2 months ago. Her smoking use included cigarettes. She smoked an average of .25 packs per day. She has never been exposed to tobacco smoke. She has never used smokeless  "tobacco. She reports that she does not currently use drugs after having used the following drugs: Marijuana. She reports that she does not drink alcohol.    Family History  Family History   Problem Relation Name Age of Onset    Hypertension Mother      Other (CABG) Mother      Hypertension Father      Lung cancer Father      Colon cancer Other grandfather         Allergies  Patient has no known allergies.    Review of Systems     Exam:  NAD alert  bharti eomi NCAT  mild ectropion  facial swelling stable  significant radiation pigmentation and thickening to the facial skin  Intraoral floor of mouth well healed, no exposed bone, no masses or lesions; stable trismus  Neck wound healing well, small dehiscence right side, no purulence or infection    Last Recorded Vitals  Temperature 36.7 °C (98.1 °F), height 1.549 m (5' 1\"), weight 79.4 kg (175 lb).    Result Date: 9/25/2023  Normal sinus rhythm Normal ECG When compared with ECG of 10-MAR-2022 13:39, Nonspecific T wave abnormality no longer evident in Inferior leads T wave inversion no longer evident in Lateral leads Confirmed by Kiet White ([id number]) on 9/25/2023 8:22:47 AM    XR chest 2 view    Result Date: 9/15/2023  Interpreted By:  REGINO WOODSON MD MRN: [id number] Patient Name: SABIHA TERRY  STUDY: TH CHEST 2 VIEW PA AND LAT;  9/14/2023 1:00 pm  INDICATION: XR    CHEST      L59.8.  COMPARISON: Chest radiograph 06/23/2022  ACCESSION NUMBER(S): [id number]  ORDERING CLINICIAN: ASHLEE SKINNER  FINDINGS:   CARDIOMEDIASTINAL SILHOUETTE: Cardiomediastinal silhouette is unchanged.  LUNGS: No consolidation, pneumothorax, or effusion.  ABDOMEN: No remarkable upper abdominal findings.  BONES: No acute osseous changes.  Remaining findings appear stable since prior comparison radiograph.      1.  No evidence of acute cardiopulmonary process.       Assessment/Plan   54 yo woman s/p right maxillectomy scapula free flap for [id number] scca, adjuvant chemorad with ORN of the mandible. S/p " debridement    Cultures with no growth  Path with no cancer    -healing well  -can restart PO diet  -RTC in 2 months, pt having some harder time swallowing, may benefit from a dilation in the future.      Jay Delgado MD

## 2024-04-08 ASSESSMENT — ENCOUNTER SYMPTOMS
SORE THROAT: 0
NAUSEA: 0
TROUBLE SWALLOWING: 1
SHORTNESS OF BREATH: 0
LEG SWELLING: 0
CONSTIPATION: 0
NUMBNESS: 1
VOMITING: 0
LIGHT-HEADEDNESS: 0
ABDOMINAL PAIN: 0
DIARRHEA: 0
CHILLS: 0
HEADACHES: 0
FEVER: 0

## 2024-04-08 NOTE — PROGRESS NOTES
Patient ID: Pamella Rose is a 53 y.o. female.  Diagnosis: Right maxillary sinus  squamous cell carcinoma  Staging: [id number]  Date of Diagnosis:    Providers:   ENT: Dr. Jay Delgado  MedOn: Dr. Kyunghee Burkitt / BABITA Hollins  RadOnc: Dr. Nadege Horton      Surgery:  2/25/22: Right maxillary biopsy showed poorly differentiated Squamous Cell Carcinoma with focal keratinization involving squmous mucosa.   3/20/22: Right Neck Exploration,  right scapular tip  free flap; maxilla reconstruction with placement of hardware   3/22/23: Right Mandibular Debridement w/ Dr. Delgado.  2/22/24: Debridement of mandible    Prior Therapies:  5/11 - 6/22/22: Completed concurrent chemoXRT w/ 7 weeks of Carboplatin (2mg/AUC/dose) + Paclitaxel (45mg/m2) and 66gy VMAT in 30 fx.     Oncologic Issues  Osteoradionecrosis  Dysphagia  Mastication difficulties     ONCOLOGIC HISTORY  - 2/4/22: Pt initially presented to Dr. Delgado with swelling of the upper gum area that didn’t improve with abx. Referred by who Dentist did an in-office xray and was concerned about a mass in the sinus  - 2/4/22 FNA showed some atypical epithelioid cells, but no definitive diagnosis  - 2/18/22: CT neck with contrast showed a large destructive mass centered in the right maxillary sinus with marked osseous destruction and extension beyond the sinus walls into the nasal cavity, hard palate, and periantral soft tissues. There is also  likely subtle extension into the inferior right orbit and possibly involvement of the infraorbital canal concerning for perineural extension of neoplasm. There are several enlarged and heterogeneous lymph nodes bilaterally compatible with metastasis.  - 2/25/22 Right maxillary biopsy showed poorly differentiated Squamous Cell Carcinoma with focal keratinization involving squmous mucosa.   - 3/14/22: PET/CT showed subcentimeter RUL without significant metabolic activity, needs to be monitored with CT chest with contrast.  - 3/16/22  Surgery: Right hemimaxillectomy without orbital exenteration, bilateral  NE (Right leverl 1-4, Left level 1-3); DL,  bronchoscopy, e sophagoscopy; Rigid Nasal Endoscopy with biopsy; Open tracheostomy with Dr. Delgado. Left scapular tip and latissimus muscle free flap , maxillary reconstruction with placement of hardware, Orbital floor reconstruction  with placement of hardware with Dr. Al.  - 3/16/22 Pathology revealed 4 LNs (with metastatic adenosquamous carcinoma) and 1 with thyroid carcinoma (subcapsular micrometastasis), level Right 1B, bilateral level 2/3 lymph nodes, with PAOLA.  Patient will need US thyroid in future and potential total thyroidectomy.  - 3/22/22 Surgery:  Right Neck Exploration, r ight scapular tip free flap; m axilla reconstruction with placement of hardware with Dr. Al   - 3/25/22 Tumor Board: Recently found to have right maxilla mass underwent surgical resection, findings c/w adeno-squamous carcinoma, also found to have  metastatic thyroid carcinoma in left level 2/3 lymph node. Rec: Adjuvant chemoradiation; total thyroidectomy  - 5/11/ - 6/22: Completed concurrent chemoXRT w/ 7 weeks of Carboplatin (2mg/AUC/dose) + Paclitaxel (45mg/m2) and 66gy VMAT in 30 fx.   - 3/22/23: Right Mandibular Debridement w/ Dr. Delgado.  - 7/10/23 FNA of a left cervical LN. Path showed polymorphous lymphoid tissue with no carcinoma identified.       Admission:  - 6/23 - 6/28/23: Admitted for Acute Otitis externa and febrile neutropenia. Received broad spectrum IV antibiotics. 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      Past Medical History:   Past Medical History:  No date: Anxiety  No date: CKD (chronic kidney disease)      Comment:  Stage 3  No date: Depression  No date: Dysphagia  09/25/2023: Encounter for electrocardiogram      Comment:  Normal sinus rhythm Normal ECG  No date: GERD (gastroesophageal reflux disease)  No date:  History of sinus cancer      Comment:  Right maxillary sinus  squamous cell carcinoma s/p                concurrent chemoXRT  : Hx of tracheostomy  No date: Hyperaldosteronism (Multi)  No date: Hyperlipidemia  No date: Hypertension  No date: Hypothyroidism  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  2. Normal global left ventricular systolic function.  3. Moderate to severe LVH and hypertensive response to exercise without any regional wall motion abnormalities at 61% MPHR.     SECTION, LOW TRANSVERSE      COLONOSCOPY      CT AORTA AND BILATERAL ILIOFEMORAL RUNOFF ANGIOGRAM W AND/OR WO IV CONTRAST  2023    CT AORTA AND BILATERAL ILIOFEMORAL RUNOFF ANGIOGRAM W AND/OR WO IV CONTRAST 2023 [facility] CT    ECHOCARDIOGRAM 2 D M MODE PANEL  2016    The left ventricular systolic function is normal with a 60-65% ejection fraction.  Spectral Doppler shows an impaired relaxation pattern of left ventricular diastolic filling. There is moderate to severe concentric left ventricular hypertrophy.    IR INTERVENTION VENOUS SAMPLING  2021    IR INTERVENTION VENOUS SAMPLING 2021 [facility] [place] [facility]    IR INTERVENTION VENOUS SAMPLING  2021    IR INTERVENTION VENOUS SAMPLING 2021 [facility] [place] [facility]    OTHER SURGICAL HISTORY      right maxillectomy scapula free flap    OTHER SURGICAL HISTORY      right mandibular debridement      Family History:    Family History   Problem Relation Name Age of Onset    Hypertension Mother      Other (CABG) Mother      Hypertension Father      Lung cancer Father       Colon cancer Other grandfather      Family Oncology History:    Cancer-related family history includes Colon cancer in an other family member; Lung cancer in her father.  Social History:    Social History     Tobacco Use    Smoking status: Former     Current packs/day: 0.00     Types: Cigarettes     Quit date: 2024     Years since quittin.2     Passive exposure: Never    Smokeless tobacco: Never   Vaping Use    Vaping status: Never Used   Substance Use Topics    Alcohol use: Never    Drug use: Not Currently     Types: Marijuana        Chief Complaint: Squamous Cell Carcinoma of Maxillary Sinus    HPI  Interval History  Pamella Rose is a 53 y.o. female with h/o tight maxillary sinus squamous cell carcinoma. [id number].  S/p right hemimaxillectomy, left scapula flap with revision and right scapula tip free flap, bilateral ND. Completed concurrent chemoXRT w/ 7 cycles of Carbo/Taxol and 66gy VMAT on 22.     Patient presents for 1 year 10 month follow up and reports the following:    She had debridement of the mandible for osteoradionecrosis of the mandible on 24. Recovering well. Ok'ed by Dr. Delgado to re-start PO diet.   She's eating regular meals. Does report food sometimes gets stuck.   She c/o tightness in the jaw and right jaw pain. Unable to open mouth very much and jaw fatigues with chewing.   Her neck feels very tight and endorse neuropathy under the right chin.   Not currently taking anything for pain. Oxycodone causes nausea. Does want to try medical marijuana.   She stopped smoking a couple month ago, before surgery.       ROS  Review of Systems   Constitutional:  Negative for chills and fever.   HENT:   Positive for hearing loss and trouble swallowing. Negative for lump/mass, mouth sores, nosebleeds and sore throat.    Respiratory:  Negative for cough and shortness of breath.    Cardiovascular:  Negative for chest pain and leg swelling.   Gastrointestinal:  Negative for abdominal pain,  "constipation, diarrhea, nausea and vomiting.   Musculoskeletal:  Positive for arthralgias (shoulder pain).   Skin:  Negative for rash.   Neurological:  Positive for numbness. Negative for headaches and light-headedness.       Allergies  No Known Allergies     Medications  Current Outpatient Medications   Medication Instructions    acetaminophen (Tylenol) 160 mg/5 mL liquid 31.2 mL (1,000 mg) by g-tube route every 8 hours for 14 days.    amLODIPine (NORVASC) 10 mg, g-tube, Daily    chlorhexidine (Peridex) 0.12 % solution 15 mL, Mouth/Throat, 3 times daily    cyclobenzaprine (FLEXERIL) 5 mg, g-tube, Nightly    famotidine (PEPCID) 20 mg, g-tube, Daily    FLUoxetine (PROZAC) 20 mg, g-tube, Daily    gabapentin 250 mg, g-tube, Daily    hydrALAZINE (APRESOLINE) 50 mg, g-tube, 3 times daily    levothyroxine (SYNTHROID, LEVOXYL) 112 mcg, g-tube, Daily before breakfast    lisinopril 40 mg, g-tube, Daily    metoprolol succinate XL (TOPROL-XL) 50 mg, oral, Daily, Do not crush or chew. HOLD UNTIL CLEARED FOR ORAL DIET    metoprolol tartrate (LOPRESSOR) 25 mg, g-tube, 2 times daily    omeprazole (PRILOSEC) 20 mg, oral, Daily    rosuvastatin (CRESTOR) 10 mg, g-tube, Daily    spironolactone (ALDACTONE) 25 mg, g-tube, Once, 1 tablet DAILY (route: oral)    terazosin (HYTRIN) 5 mg, g-tube, Nightly        OBJECTIVE:    VS:  /78 (BP Location: Left arm, Patient Position: Sitting, BP Cuff Size: Large adult)   Pulse 74   Temp 36.4 °C (97.5 °F)   Resp 14   Ht (S) 1.592 m (5' 2.68\")   Wt 79.4 kg (175 lb)   SpO2 95%   BMI 31.32 kg/m²   Daily Weight  04/16/24 : 79.4 kg (175 lb)  03/15/24 : 79.4 kg (175 lb)  02/22/24 : 82.1 kg (181 lb)  02/08/24 : 77.1 kg (170 lb)  02/08/24 : 79.6 kg (175 lb 7.8 oz)  01/12/24 : 78.7 kg (173 lb 6.4 oz)  11/24/23 : 77.2 kg (170 lb 1.6 oz)      Physical Exam  Constitutional:       Appearance: Normal appearance. She is well-developed.   HENT:      Head: Normocephalic and atraumatic.      Comments: + " facial swelling bilaterally cheek, R>L  Neck stiffness R>L     Right Ear: External ear normal. No tenderness.      Left Ear: External ear normal. No tenderness.      Nose: Nose normal.      Mouth/Throat:      Mouth: Mucous membranes are moist. No injury or oral lesions.      Tongue: No lesions.      Pharynx: Oropharynx is clear.      Comments: Edentulous, trismus with limited oral opening  Eyes:      Extraocular Movements: Extraocular movements intact.      Conjunctiva/sclera: Conjunctivae normal.      Pupils: Pupils are equal, round, and reactive to light.   Neck:      Thyroid: No thyroid mass.        Comments: Scabbing in submental region, on prior incision site  Cardiovascular:      Rate and Rhythm: Normal rate and regular rhythm.   Pulmonary:      Effort: Pulmonary effort is normal. No respiratory distress.      Breath sounds: Normal breath sounds.   Abdominal:      General: Bowel sounds are normal. There is no distension or abdominal bruit.      Palpations: Abdomen is soft. There is no mass.      Tenderness: There is no abdominal tenderness.   Musculoskeletal:         General: Normal range of motion.      Cervical back: Normal range of motion and neck supple. No signs of trauma. Normal range of motion.      Right lower leg: No edema.      Left lower leg: No edema.   Lymphadenopathy:      Cervical: No cervical adenopathy.      Upper Body:      Right upper body: No axillary adenopathy.      Left upper body: No axillary adenopathy.   Skin:     General: Skin is warm and dry.      Findings: No lesion or rash.   Neurological:      General: No focal deficit present.      Mental Status: She is alert and oriented to person, place, and time.      Gait: Gait is intact.   Psychiatric:         Mood and Affect: Mood and affect normal.         Behavior: Behavior is cooperative.         Diagnostic Results     Labs  Results from last 7 days   Lab Units 04/16/24  1241   WBC AUTO x10*3/uL 4.5   HEMOGLOBIN g/dL 13.2   HEMATOCRIT %  42.6   PLATELETS AUTO x10*3/uL 141*   NEUTROS ABS x10*3/uL 2.65   LYMPHS ABS AUTO x10*3/uL 1.34   MONOS ABS AUTO x10*3/uL 0.34   EOS ABS AUTO x10*3/uL 0.12   NEUTROS PCT AUTO % 59.2   LYMPHS PCT AUTO % 29.9   MONOS PCT AUTO % 7.6   EOS PCT AUTO % 2.7      Results from last 7 days   Lab Units 04/16/24  1241   GLUCOSE mg/dL 144*   SODIUM mmol/L 142   POTASSIUM mmol/L 4.1   CHLORIDE mmol/L 104   CO2 mmol/L 31   BUN mg/dL 21   CREATININE mg/dL 1.21*   EGFR mL/min/1.73m*2 53*   CALCIUM mg/dL 10.2   ALBUMIN g/dL 4.5   PROTEIN TOTAL g/dL 7.6   BILIRUBIN TOTAL mg/dL 0.5   ALK PHOS U/L 50   ALT U/L 8   AST U/L 10     Results from last 7 days   Lab Units 04/16/24  1241   TSH mIU/L 36.12*   FREE T4 ng/dL 1.01                      Images        Assessment/Plan     ASSESSMENT  Pamella Rose is a 53 y.o. female with h/o tight maxillary sinus squamous cell carcinoma. [id number].  S/p right hemimaxillectomy, left scapula flap with revision and right scapula tip free flap, bilateral ND. From 5/11/ - 6/22/22 she completed concurrent chemoXRT w/ 7 weeks of Carboplatin (2mg/AUC/dose) + Paclitaxel (45mg/m2) and 66gy VMAT  in 30 fx.      # Right maxillary sinus squamous cell carcinoma  - 3/16/22 Right hemimaxillectomy, left scapula flap with revision and right scapula tip free flap, b/l ND with negative margins.   - Since surgery she has pain in right side of face, tingling and swelling in right cheek, swelling has improved this week. Following Supp Onc (Sarah NETTLES) for pain management.  - 6/22/22 Completed concurrent chemoXRT w/ 7 weeks of Carboplatin (2mg/AUC/dose) + Paclitaxel (45mg/m2) and 66gy VMAT in 30 fx  - Unfortunately was hospitalized 6/26 -6/28 for febrile neutropenia and otitis externa, d/c home on oral abx, and eye and ear drops.   - 11/18/22: post treatment PET/CT showed good response, increased metabolic activity along the right maxillary bone graft, likely reactive. Patient was seen by Dr. Delgado in [place], per  his exam, she has  MAYELA.  - 3/16/23: ongoing lymphedema of right face/mandible and lateral nose with neuropathy and pressure pain.  Pain is managed by supp onc. She is scheduled to have procedure next week to  work on exposed bone around mandible.  - 3/22/23: s/p right mandibular debridement  - 4/26/23 MRI Orbits showed post op changes w/ interval decrease in the enhancement in the surgical bed and surround soft tissues.   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  - 7/10/23 FNA of left parotic nodule showed polymorphous lymphoid tissue w/ no carcinoma identified.   - 7/20/23: Patient is stable overall, has chronic SEs from chemoradiation including chronic facial and neck swelling/lymphedema, right shoulder contracture with decreases ROM, weakness and right arm neuropathy. Has chronic fatigue. Patient presented me with  Medical Source Statement to complete from her .   - 10/5/23: Patient with about 1.5 cm open wound under right mandible at junction with neck. Has been draining for 2 weeks, drainage is mucoid mixed with blood. No fevers/chills, no erythema. Unfortunately no labs today, however does not appear to be infected. She does have wound care appts set up but unclear if patient is going to these appts consistently as she cites issues with transportation.   - 2/9/24: Patient admitted for osteoradionecrosis a few months ago. She did not continue HPO therapy.   - 4/16/24: Patient had mandibular debridement and reconstruction with Dr. Delgado on 2/22. She has swelling, pain and neuropathy in the right mandible. She does have muscle tightness in the right neck with spasms. She has stopped smoking a couple months ago.    # Trismus  - Pt with trismus and jaw fatigue, worse since surgery in February.     # Dysphagia  - Ongoing dysphagia, though pt feels this may be worse as she feel food gets stuck more frequently. Currently eating a regular diet    # Thyroid carcinoma  "  - 3/16/22 Pathology showing 1 left level 2/3 LN with thyroid carcinoma (subcapsular micrometastasis)  - 6/24/22 CT Neck showed Similar appearance of the thyroid gland with sub-centimeter hypodensities in the right greater than left lobe.  - 11/18/22 restaging scan did not find anything remarkable in the thyroid  - Dr. Delgado is following this, per his 6/16 FUV \"since this was an incidental micromets, and there are no significant nodules after chemoradiation, would just observe with serial ultrasounds at this time; pt has significantly scarred neck and likely no residual  disease in the thyroid\"    # Hypothroidism  - 8/11/22 TSH 3.74  - 12/1/22: TSH 17.20  - 2/17/23: started synthroid 50mcg on 2/17/23 based on 12/1/22 TSH level.  - 4/13/23 TSH 14.91  - 6/22/23 TSH 29.7  - 7/20/23 TSH 32.9. Levothyroxine increased to 88mcg. Recheck TSH/T4 on 8/25  - 10/5/23: Patient reports she has not been taking levothyroxine. Patient did not go to lab today to have TSH redrawn. Levothyroxine 100mg was sent to [facility] pharmacy for patient to  today.   - 2/8/24: TSH 15.32, T4 1.01. Patient to increase Levothyroxine to 112mcg.   - 4/16/24: TSH 36.12, T4 1.01. Will increase to 137mcg.     # Right eye ptosis/epiphora/possible conjunctivitis  - Right eye ptosis and epiphora, likely related to prior surgery/radiation. No vision change/eye pain  - redness/itchiness in right eye for a while. continue treat her for potential conjunctivitis with Floxin otic drop     # Facial lymphedema  - Right side of face and neck. Swelling fluctuates. Start twice a week lymphedema therapy on 10/31/22. Has had good improvement on the swelling though still has lymphedema at baseline.  - Advised that she continue to do daily lymphedema exercises      # Right ear hearing loss  - Patient c/o subjective hearing loss in right ear, ear still feels full, sounds muffled 3 months after treatment  - Was referred to Audiology and had audiogram scheduled 11/11/22, " however patient missed this appointment.   - 10/5/23: Patient again endorsed worsening hearing. Referral made again for Audiology and patient is agreeable.       # CKD, stage 3  - Following with Dr. Ana Lee  - Today Cr 1.21, GFR 53, around her baseline. Encourage patient to keep up with hydration via PEG and PO.     # Depression  - Following Sushil Bryan ([facility]), was started on Duloxetine 20mg and Olanzapine 2.5mg     # Hypertension  - 148/97, HR 73. Patient has PCP at [facility], reports she's on Lisinopril, Amlodipine, Spironolactone, Metoprolol, and is on a statin. She reports missing some medications here and there. Advised patient to taking her BP meds daily.   - BP wnl in clinic today    # Eval for Medical Source Statement  - Paper work for eval of ability to return to work was completed today. To be mailed to patients  by ENT nurse Georgina Hannah.        PLAN:  -- 5/3/24 FUV w/ Dr. Delgado.   -- Referral to SLP for dysphagia and trismus.  -- RTC 4 month with [facility] on 8/6/24, labs prior, cbc, cmp, tsh/t4  -- INCREASE Levothyroxine  to 137mcg. RX sent to [facility]. Recheck TSH/T4 in 6 week on 5/30

## 2024-04-16 ENCOUNTER — LAB (OUTPATIENT)
Dept: LAB | Facility: HOSPITAL | Age: 54
End: 2024-04-16
Payer: COMMERCIAL

## 2024-04-16 ENCOUNTER — OFFICE VISIT (OUTPATIENT)
Dept: HEMATOLOGY/ONCOLOGY | Facility: HOSPITAL | Age: 54
End: 2024-04-16
Payer: COMMERCIAL

## 2024-04-16 ENCOUNTER — PHARMACY VISIT (OUTPATIENT)
Dept: PHARMACY | Facility: CLINIC | Age: 54
End: 2024-04-16
Payer: MEDICAID

## 2024-04-16 VITALS
HEART RATE: 74 BPM | SYSTOLIC BLOOD PRESSURE: 139 MMHG | HEIGHT: 63 IN | BODY MASS INDEX: 31.01 KG/M2 | OXYGEN SATURATION: 95 % | WEIGHT: 175 LBS | TEMPERATURE: 97.5 F | DIASTOLIC BLOOD PRESSURE: 78 MMHG | RESPIRATION RATE: 14 BRPM

## 2024-04-16 DIAGNOSIS — E03.9 HYPOTHYROIDISM, UNSPECIFIED TYPE: ICD-10-CM

## 2024-04-16 DIAGNOSIS — R13.10 DYSPHAGIA, UNSPECIFIED TYPE: ICD-10-CM

## 2024-04-16 DIAGNOSIS — R25.2 TRISMUS: ICD-10-CM

## 2024-04-16 DIAGNOSIS — I89.0 LYMPHEDEMA DUE TO AND NOT CONCURRENT WITH IONIZING RADIOTHERAPY: ICD-10-CM
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
## 2024-04-16 DIAGNOSIS — C41.1 CANCER OF INFERIOR MAXILLA (MULTI): Primary | ICD-10-CM

## 2024-04-16 DIAGNOSIS — C41.1 CANCER OF INFERIOR MAXILLA (MULTI): ICD-10-CM

## 2024-04-16 DIAGNOSIS — Z08 ENCOUNTER FOR FOLLOW-UP SURVEILLANCE OF HEAD AND NECK CANCER: ICD-10-CM

## 2024-04-16 DIAGNOSIS — M27.2 INFLAMMATORY CONDITIONS OF JAWS: ICD-10-CM

## 2024-04-16 DIAGNOSIS — Z85.89 ENCOUNTER FOR FOLLOW-UP SURVEILLANCE OF HEAD AND NECK CANCER: ICD-10-CM

## 2024-04-16 LAB
ACID FAST STN SPEC: NORMAL
ALBUMIN SERPL BCP-MCNC: 4.5 G/DL (ref 3.4–5)
ALP SERPL-CCNC: 50 U/L (ref 33–110)
ALT SERPL W P-5'-P-CCNC: 8 U/L (ref 7–45)
ANION GAP SERPL CALC-SCNC: 11 MMOL/L (ref 10–20)
AST SERPL W P-5'-P-CCNC: 10 U/L (ref 9–39)
BASOPHILS # BLD AUTO: 0.01 X10*3/UL (ref 0–0.1)
BASOPHILS NFR BLD AUTO: 0.2 %
BILIRUB SERPL-MCNC: 0.5 MG/DL (ref 0–1.2)
BUN SERPL-MCNC: 21 MG/DL (ref 6–23)
CALCIUM SERPL-MCNC: 10.2 MG/DL (ref 8.6–10.3)
CHLORIDE SERPL-SCNC: 104 MMOL/L (ref 98–107)
CO2 SERPL-SCNC: 31 MMOL/L (ref 21–32)
CREAT SERPL-MCNC: 1.21 MG/DL (ref 0.5–1.05)
EGFRCR SERPLBLD CKD-EPI 2021: 53 ML/MIN/1.73M*2
EOSINOPHIL # BLD AUTO: 0.12 X10*3/UL (ref 0–0.7)
EOSINOPHIL NFR BLD AUTO: 2.7 %
ERYTHROCYTE [DISTWIDTH] IN BLOOD BY AUTOMATED COUNT: 16.1 % (ref 11.5–14.5)
GLUCOSE SERPL-MCNC: 144 MG/DL (ref 74–99)
HCT VFR BLD AUTO: 42.6 % (ref 36–46)
HGB BLD-MCNC: 13.2 G/DL (ref 12–16)
IMM GRANULOCYTES # BLD AUTO: 0.02 X10*3/UL (ref 0–0.7)
IMM GRANULOCYTES NFR BLD AUTO: 0.4 % (ref 0–0.9)
LYMPHOCYTES # BLD AUTO: 1.34 X10*3/UL (ref 1.2–4.8)
LYMPHOCYTES NFR BLD AUTO: 29.9 %
MCH RBC QN AUTO: 29.3 PG (ref 26–34)
MCHC RBC AUTO-ENTMCNC: 31 G/DL (ref 32–36)
MCV RBC AUTO: 95 FL (ref 80–100)
MONOCYTES # BLD AUTO: 0.34 X10*3/UL (ref 0.1–1)
MONOCYTES NFR BLD AUTO: 7.6 %
MYCOBACTERIUM SPEC CULT: NORMAL
NEUTROPHILS # BLD AUTO: 2.65 X10*3/UL (ref 1.2–7.7)
NEUTROPHILS NFR BLD AUTO: 59.2 %
NRBC BLD-RTO: 0 /100 WBCS (ref 0–0)
PLATELET # BLD AUTO: 141 X10*3/UL (ref 150–450)
POTASSIUM SERPL-SCNC: 4.1 MMOL/L (ref 3.5–5.3)
PROT SERPL-MCNC: 7.6 G/DL (ref 6.4–8.2)
RBC # BLD AUTO: 4.51 X10*6/UL (ref 4–5.2)
SODIUM SERPL-SCNC: 142 MMOL/L (ref 136–145)
T4 FREE SERPL-MCNC: 1.01 NG/DL (ref 0.78–1.48)
TSH SERPL-ACNC: 36.12 MIU/L (ref 0.44–3.98)
WBC # BLD AUTO: 4.5 X10*3/UL (ref 4.4–11.3)

## 2024-04-16 PROCEDURE — 3078F DIAST BP <80 MM HG: CPT | Performed by: STUDENT IN AN ORGANIZED HEALTH CARE EDUCATION/TRAINING PROGRAM

## 2024-04-16 PROCEDURE — 99215 OFFICE O/P EST HI 40 MIN: CPT | Performed by: STUDENT IN AN ORGANIZED HEALTH CARE EDUCATION/TRAINING PROGRAM

## 2024-04-16 PROCEDURE — 36415 COLL VENOUS BLD VENIPUNCTURE: CPT

## 2024-04-16 PROCEDURE — 80053 COMPREHEN METABOLIC PANEL: CPT

## 2024-04-16 PROCEDURE — 85025 COMPLETE CBC W/AUTO DIFF WBC: CPT

## 2024-04-16 PROCEDURE — RXMED WILLOW AMBULATORY MEDICATION CHARGE

## 2024-04-16 PROCEDURE — 3075F SYST BP GE 130 - 139MM HG: CPT | Performed by: STUDENT IN AN ORGANIZED HEALTH CARE EDUCATION/TRAINING PROGRAM

## 2024-04-16 PROCEDURE — 1036F TOBACCO NON-USER: CPT | Performed by: STUDENT IN AN ORGANIZED HEALTH CARE EDUCATION/TRAINING PROGRAM

## 2024-04-16 PROCEDURE — 84443 ASSAY THYROID STIM HORMONE: CPT

## 2024-04-16 PROCEDURE — 84439 ASSAY OF FREE THYROXINE: CPT

## 2024-04-16 ASSESSMENT — COLUMBIA-SUICIDE SEVERITY RATING SCALE - C-SSRS
6. HAVE YOU EVER DONE ANYTHING, STARTED TO DO ANYTHING, OR PREPARED TO DO ANYTHING TO END YOUR LIFE?: NO
2. HAVE YOU ACTUALLY HAD ANY THOUGHTS OF KILLING YOURSELF?: NO
1. IN THE PAST MONTH, HAVE YOU WISHED YOU WERE DEAD OR WISHED YOU COULD GO TO SLEEP AND NOT WAKE UP?: NO

## 2024-04-16 ASSESSMENT — PAIN SCALES - GENERAL: PAINLEVEL: 0-NO PAIN

## 2024-04-16 ASSESSMENT — ENCOUNTER SYMPTOMS: DEPRESSION: 0

## 2024-04-17 ENCOUNTER — TELEPHONE (OUTPATIENT)
Dept: HEMATOLOGY/ONCOLOGY | Facility: HOSPITAL | Age: 54
End: 2024-04-17
Payer: COMMERCIAL

## 2024-04-17 RX ORDER — LEVOTHYROXINE SODIUM 137 UG/1
137 TABLET ORAL
Qty: 30 TABLET | Refills: 11 | Status: SHIPPED | OUTPATIENT
Start: 2024-04-17

## 2024-04-17 ASSESSMENT — ENCOUNTER SYMPTOMS
ARTHRALGIAS: 1
COUGH: 0

## 2024-04-17 NOTE — TELEPHONE ENCOUNTER
Called patient to inform her that her TSH is slightly elevated. Vicky Boles increased her levothyroxine 137mcg. Patient voiced understanding and will plan to start her new dose tomorrow, 4/18.     Patient aware of SLP appointment on 5/3 with Jey at 2PM.     Instructed patient to call office if she had any questions or concern. She voiced understanding.     Vicky Boles aware of conversation.

## 2024-05-03 ENCOUNTER — OFFICE VISIT (OUTPATIENT)
Dept: OTOLARYNGOLOGY | Facility: HOSPITAL | Age: 54
End: 2024-05-03
Payer: COMMERCIAL

## 2024-05-03 ENCOUNTER — EVALUATION (OUTPATIENT)
Dept: SPEECH THERAPY | Facility: HOSPITAL | Age: 54
End: 2024-05-03
Payer: COMMERCIAL

## 2024-05-03 VITALS — BODY MASS INDEX: 31.18 KG/M2 | TEMPERATURE: 97.2 F | HEIGHT: 63 IN | WEIGHT: 176 LBS

## 2024-05-03 DIAGNOSIS — I12.9 HYPERTENSIVE RENAL DISEASE: ICD-10-CM

## 2024-05-03 DIAGNOSIS — M27.2 OSTEORADIONECROSIS OF MANDIBLE: ICD-10-CM
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
## 2024-05-03 DIAGNOSIS — M27.2 OSTEORADIONECROSIS OF JAW: ICD-10-CM
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
## 2024-05-03 DIAGNOSIS — R13.12 OROPHARYNGEAL DYSPHAGIA: Primary | ICD-10-CM

## 2024-05-03 PROCEDURE — 99213 OFFICE O/P EST LOW 20 MIN: CPT | Performed by: OTOLARYNGOLOGY

## 2024-05-03 PROCEDURE — 1036F TOBACCO NON-USER: CPT | Performed by: OTOLARYNGOLOGY

## 2024-05-03 PROCEDURE — 92612 ENDOSCOPY SWALLOW (FEES) VID: CPT | Mod: GN

## 2024-05-03 PROCEDURE — 99024 POSTOP FOLLOW-UP VISIT: CPT | Performed by: OTOLARYNGOLOGY

## 2024-05-03 RX ORDER — METOPROLOL TARTRATE 25 MG/1
25 TABLET, FILM COATED ORAL 2 TIMES DAILY
Qty: 56 TABLET | Refills: 0 | Status: SHIPPED | OUTPATIENT
Start: 2024-05-03 | End: 2024-06-06

## 2024-05-03 ASSESSMENT — PATIENT HEALTH QUESTIONNAIRE - PHQ9
1. LITTLE INTEREST OR PLEASURE IN DOING THINGS: NOT AT ALL
SUM OF ALL RESPONSES TO PHQ9 QUESTIONS 1 & 2: 0
2. FEELING DOWN, DEPRESSED OR HOPELESS: NOT AT ALL

## 2024-05-03 NOTE — PROGRESS NOTES
ENT Follow up Visit    Chief Complaint: Neck drainage    History Of Present Illness  Pamella Rose is a 53 y.o. female presents for follow up.  She had a [id number] right maxillary cancer s/p resection with scapula free flap. Had to have a revision scapula due to loss of first flap. She has been dealing with ORN of the mandible and was on HBO. She was admitted with neck drainage on -. She underwent a combined transcervical and endoscopic approach to debridement of her mandible on 24. She was last seen on 3/15/24. At that time, she did endorse some dysphagia, but did not have any drainage.     Today she reports that she overall is doing well. She did note a new area of her neck that opened up and intermittently crusts a few weeks ago. This does not drain when she eats or drinks, and she has not had fevers or chills associated with it. She is concerned about some tightness/swelling of her left upper lip and her left neck.         Past Medical History  She has a past medical history of Anxiety, CKD (chronic kidney disease), Depression, Dysphagia, Encounter for electrocardiogram (2023), GERD (gastroesophageal reflux disease), History of sinus cancer, tracheostomy (), Hyperaldosteronism (Multi), Hyperlipidemia, Hypertension, Hypothyroidism, Osteoradionecrosis of mandible (2024), Proteinuria, and Thyroid cancer (Multi).    Surgical History  She has a past surgical history that includes IR intervention venous sampling (2021); IR intervention venous sampling (2021); CT angio aorta and bilateral iliofemoral runoff w and or wo IV contrast (2023);  section, low transverse; Colonoscopy; Cardiovascular stress test (2016); echocardiogram 2 d m mode panel (2016); Other surgical history; and Other surgical history.     Social History  She reports that she quit smoking about 4 months ago. Her smoking use included cigarettes. She has never been exposed to tobacco  "smoke. She has never used smokeless tobacco. She reports that she does not currently use drugs after having used the following drugs: Marijuana. She reports that she does not drink alcohol.    Family History  Family History   Problem Relation Name Age of Onset    Hypertension Mother      Other (CABG) Mother      Hypertension Father      Lung cancer Father      Colon cancer Other grandfather         Allergies  Patient has no known allergies.    Review of Systems     Exam:  NAD alert  bharti eomi NCAT  mild ectropion  facial swelling stable, no masses or lesions of lip.  significant radiation pigmentation and thickening to the facial skin  Intraoral floor of mouth well healed, no exposed bone, no masses or lesions; stable trismus  Neck wound healing well, small dehiscence in the midline, no purulence or infection    Last Recorded Vitals  Temperature 36.2 °C (97.2 °F), height 1.592 m (5' 2.68\"), weight 79.8 kg (176 lb).    Result Date: 9/25/2023  Normal sinus rhythm Normal ECG When compared with ECG of 10-MAR-2022 13:39, Nonspecific T wave abnormality no longer evident in Inferior leads T wave inversion no longer evident in Lateral leads Confirmed by Kiet White ([id number]) on 9/25/2023 8:22:47 AM    XR chest 2 view    Result Date: 9/15/2023  Interpreted By:  REGINO WOODSON MD MRN: [id number] Patient Name: SABIHA TERRY  STUDY: TH CHEST 2 VIEW PA AND LAT;  9/14/2023 1:00 pm  INDICATION: XR    CHEST      L59.8.  COMPARISON: Chest radiograph 06/23/2022  ACCESSION NUMBER(S): [id number]  ORDERING CLINICIAN: ASHLEE SKINNER  FINDINGS:   CARDIOMEDIASTINAL SILHOUETTE: Cardiomediastinal silhouette is unchanged.  LUNGS: No consolidation, pneumothorax, or effusion.  ABDOMEN: No remarkable upper abdominal findings.  BONES: No acute osseous changes.  Remaining findings appear stable since prior comparison radiograph.      1.  No evidence of acute cardiopulmonary process.       Assessment/Plan   54 yo woman s/p right maxillectomy scapula " free flap for [id number] scca, adjuvant chemorad with ORN of the mandible who is s/p debridement.    -Overall healing well with a small area of dehiscence.  -Continue PO diet  -Referral to Facial Plastics Dr. Best for consideration of eyelid and midface procedures  -MBS with Espohagram; consideration of dilation pending results    Julio Carlisle MD

## 2024-05-03 NOTE — PROCEDURES
Speech-Language Pathology    [facility] Adult Outpatient FEES Evaluation    Patient Name: Pamella Rose  MRN: [id number]  Today's Date: 5/3/2024   Time Calculation  Start Time: 1400  Stop Time: 1445  Time Calculation (min): 45 min         Reason for consult:  Patient currently PEG dependent with frequent attempts at PO which pt endorses coughing and choking at times with liquids and solids.  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 Had to have a revision scapula due to loss of first flap. She has been dealing with ORN of the mandible and was on HBO. She was admitted with neck drainage on 11/1-11/5/223. She underwent a combined transcervical and endoscopic approach to debridement of her mandible on 2/22/24. She was last seen on 3/15/24. At that time, she did endorse some dysphagia, but did not have any drainage.   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     Recommendations:  Continue with #4 Purees and thin liquids.  Supraglottic swallow with chin tuck   Multiple hard swallows.  Alternate liquids and solids  Oral care before and after PO.  Continue with daily swallow exercises to improve swallow safety and efficiency.     Eating Assessment Tool (EAT-10)   EAT-10 TOTAL: 21/40  A total score of 3 or above may indicate difficulty with swallowing safely and/or efficiently    Functional Oral Intake Scale   FIOS level Comment    Level 1  Nothing by Mouth    Level 2 Tube dependent with minimal attempts of food and liquid.   X Level 3 Tube dependent with consistent oral intake of food and liquid.    Level 4 Total oral diet of a single consistency.    Level 5 Total oral diet with multiple consistencies, but requires special  preparations and compensations.    Level 6 Total oral diet with multiple consistencies without special preparations but specific food limitations.    Level 7 Total oral diet with no restrictions.      Lingual ROM assessment scale (Lazarus 2014)   Protrusion  ____ 100 Normal (Tongue protrudes >=15mm past upper lip margin)  __X__ 50 Mild-mod impairment (Tongue protrudes >1 but <15mm)  ____ 25 Severely impaired (Some movement but fails to reach upper lip margin)  ____ 0 Totally impaired (No movement)   Lateralization (each side)  __X__ 100 Normal (Tongue touches corner of mouth)  ____ 50 Mild-mod impairment (<50% reduction in movement from corner of the mouth)  ____ 25 Severely impaired (>50% reduction in movement from corner of the mouth)  ____ 0 Totally impaired (No tongue movement in either direction)   Elevation  _X___ 100 Normal (Tongue tip contact with upper alveolar ridge)  ____ 50 Moderately impaired ( tip elevates but no contact with upper alveolar ridge)  ____ 0 Severely impaired (No visible tongue tip elevation)    Maximal Inter-incisor Distance ___15mm_____      PHYSICAL EXAMINATION including ORAL MECHANISM  Trigeminal Nerve (V)     Jaw ROM:  Impaired  Facial Nerve (VII)     Asymmetry at rest: Impaired     Lip pucker: Intact     Smile: Impaired     Lip/smile alternation: Impaired  Glossopharyngeal, Vagus (IX, X)     Uvula at rest: unable to view     Palate at rest: unable to view     Palatal elevation /a/: unable to view due to trismus  Hypoglossal (XII)     Tongue at rest: Intact     Tongue protrusion: Intact     Side to side: Intact     Tongue elevation: Intact  Diadochokinetic Rate /puh/ /tuh/ /kuh/, /puhtuhkuh/: Impaired    FEES Impression:  Moderate oropharyngeal dysphagia secondary to multimodality treatments for head and neck cancer.   Oral phase marked by reduced jaw opening for cup and spoon presentation with minor anterior oral spillage. Pt unable to utilize straw due to VIP and reduced buccal  cavity constriction. Prolonged oral manipulation with all liquids boluses resulting in piecemeal deglutition with min oral residues after the swallow.   Pharyngeal phase marked by reduced white out duration with observable full penetration of all boluses to the level of the ventricular folds and true vocal folds. Overt aspiration suspected x2 during the swallow with thin liquids reflexive productive cough to clear aspirate materials from vestibule. Pt instinctually utilizing supraglottic swallow maneuver with repeated swallowing of to clear boluses from the pharynx. After multiple swallow; trace-mod amounts of residues noted throughout the lumen. Residues most prominent with purees and thicker viscosities which pt cleared with liquid washes and repeated dry swallows.   Chin tuck maneuver effective to minimize amounts of penetration with all consistencies during the swallow and therefor reducing the frequency and amounts of aspiration.     Epiglottis:  2- Moderate edema  Vallecula: 0- Normal   Pharyngoepiglottic folds:  1- Mild edema   Aryepiglottic folds:  2- Moderate edema  Arytenoids:  3- Severe edema  Pyriform sinuses: 0- Normal   False Vocal folds:  2- Moderate edema  True Vocal folds: 0- Normal   No pooling or aspiration of oral secretions observed     Rosenbeck:  Consistencies/Score: Thin: 4 - Material enters airway, contacts cords, ejected from airway  Consistencies/Score: Nectar Liquid: 4 - Material enters airway, contacts cords, ejected from airway (during the swallow; visible due to lack of epiglottic deflection)  Consistencies/Score: Pudding/Puree: 1 - Material does not enter airway    Plan:   Patient to follow up with SLP for rehabilitation and functional maintenance program.  Complete MBSS for further assessment of the PES and swallow mechanics.  Follow up with Dr. Mercer for dysphagia management.  Follow up with PT for trismus treatment.  Follow up with dietician for TF recs.    Frequency: TBD  Start  date of tx: 05/03/24    Goals:  Pt will complete a series of jaw exercises/stretches independent of cues 3x a day, 7 days a week; to reduced the effects of radiation on the jaw muscles necessary to consume PO safely and efficiently.   Pt will complete a series of neck stretches independent of cues, 5 times a day for 7 days a week; to reduce the effects of radiation on the muscles necessary for optimal head position for safe and efficient PO intake.  Pt will complete effortful swallows 10 reps, 3 times per day for 7 days a week; to strengthen pharyngeal muscles for improve bolus clearance through the pharynx.    Pt will complete isometric shaker for 1 min 3 reps at 3 sets a day, independent of cues; to improve hyolaryngeal elevation/excursion and UES opening during the swallow.   Patient will tolerate the least restrictive diet without overt s/s of oropharyngeal dysphagia 10/10 trials.   Pt will utilize and recall compensatory strategies independent of cues 100% of the time to tolerate the least restrictive diet without overt s/s of pharyngeal deficits.      Education/Treatment: SLP reviewed the expected changes from radiation treatment to the mechanics of speech and swallowing function. Dental care, and oral hygiene in relation to aspiration discussed. As well as the additional prognostic indicators that can contribute to higher risks of development of aspiration pneumonia. Handouts provided for referral resources at home. A prophylactic exercise program with handout reviewed and demonstrated with the emphasis of initiating exercises to maintain strength, mobility and endurance of the speech and swallow mechanisms. Pt able to return demonstrate exercises with adequate skill and follow through. Swallow guidelines and FEES images reviewed.       Consultations/Referrals/Coordination of Services: [facility] and PT and dietician.

## 2024-05-09 ENCOUNTER — PHARMACY VISIT (OUTPATIENT)
Dept: PHARMACY | Facility: CLINIC | Age: 54
End: 2024-05-09
Payer: MEDICAID

## 2024-05-09 PROCEDURE — RXMED WILLOW AMBULATORY MEDICATION CHARGE

## 2024-05-10 ENCOUNTER — PHARMACY VISIT (OUTPATIENT)
Dept: PHARMACY | Facility: CLINIC | Age: 54
End: 2024-05-10

## 2024-05-17 ENCOUNTER — APPOINTMENT (OUTPATIENT)
Dept: OTOLARYNGOLOGY | Facility: HOSPITAL | Age: 54
End: 2024-05-17
Payer: COMMERCIAL

## 2024-05-31 ENCOUNTER — APPOINTMENT (OUTPATIENT)
Dept: RADIOLOGY | Facility: HOSPITAL | Age: 54
End: 2024-05-31
Payer: COMMERCIAL

## 2024-06-14 PROCEDURE — RXMED WILLOW AMBULATORY MEDICATION CHARGE

## 2024-06-17 ENCOUNTER — PHARMACY VISIT (OUTPATIENT)
Dept: PHARMACY | Facility: CLINIC | Age: 54
End: 2024-06-17
Payer: MEDICAID

## 2024-06-19 ENCOUNTER — APPOINTMENT (OUTPATIENT)
Dept: OTOLARYNGOLOGY | Facility: CLINIC | Age: 54
End: 2024-06-19
Payer: COMMERCIAL

## 2024-06-19 DIAGNOSIS — H02.533 LID RETRACTION OF RIGHT EYE: Primary | ICD-10-CM

## 2024-06-19 PROCEDURE — 99213 OFFICE O/P EST LOW 20 MIN: CPT | Performed by: OTOLARYNGOLOGY

## 2024-06-19 NOTE — PROGRESS NOTES
Facial Plastic & Reconstructive Surgery      Chief Complaint: eyelid and midface     Referring Provider: Dr. Delgado    54yo female with PMHx of 4aN2M0 right maxillary cancer s/p resection with scapula free flap. Had to have a revision scapula due to loss of first flap. She has been dealing with ORN of the mandible and was on HBO. She was admitted with neck drainage on -. She underwent a combined transcervical and endoscopic approach to debridement of her mandible on 24. Was referred for evaluation of eyelid and mandible procedures    Location: right lower eyelid  Quality: retraction, malposition  Severity: ssevere  Duration: months  Timing: all times  Context: as above, cancer resection, reconstruction, adjuvant therapy  Modifying factors:  none  Associated signs and symptoms: incomplete eyelid closure, epiphora    Past Medical History  She has a past medical history of Anxiety, CKD (chronic kidney disease), Depression, Dysphagia, Encounter for electrocardiogram (2023), GERD (gastroesophageal reflux disease), History of sinus cancer, tracheostomy (), Hyperaldosteronism (Multi), Hyperlipidemia, Hypertension, Hypothyroidism, Osteoradionecrosis of mandible (2024), Proteinuria, and Thyroid cancer (Multi).    Surgical History  She has a past surgical history that includes IR intervention venous sampling (2021); IR intervention venous sampling (2021); CT angio aorta and bilateral iliofemoral runoff w and or wo IV contrast (2023);  section, low transverse; Colonoscopy; Cardiovascular stress test (2016); echocardiogram 2 d m mode panel (2016); Other surgical history; and Other surgical history.     Social History  She reports that she quit smoking about 5 months ago. Her smoking use included cigarettes. She has never been exposed to tobacco smoke. She has never used smokeless tobacco. She reports that she does not currently use drugs after having used  the following drugs: Marijuana. She reports that she does not drink alcohol.    Family History  Family History   Problem Relation Name Age of Onset    Hypertension Mother      Other (CABG) Mother      Hypertension Father      Lung cancer Father      Colon cancer Other grandfather         Allergies  Patient has no known allergies.    Past, family, and social history obtained but not pertinent to current problem unless documented above.    All other systems have been reviewed and are negative for complaint unless documented above.    Physical Exam:  General: Well-developed and well-nourished in appearance.  Skin: No rashes or concerning lesions on the visible portions of the skin.  Eyes: Extraocular movements intact. Visual fields grossly normal. Right lower eyelid retraction, ectropion  Respiratory: No respiratory distress. Quiet breathing without stertor or stridor.  Cardiovascular: Regular rate and rhythm. Warm extremities with equal pulses.  Psych: Normal mood and affect. Judgement and insight appropriate.  Neuro: Alert and oriented. CN II-XII grossly intact. No focal deficits.  Musculoskeletal: Gait intact. Moves all extremities well without apparent deformities.        Assessment: lower lid retraction, volume loss    Plan: plan for ectropion repair, autologous fat grafting to the right lower eyelid with reinforcement by allograft material CTM Flow    Discussed risks including blindness, wound breakdown, infection, rejection in setting of previous complex reconstruction and adjuvant therapy

## 2024-06-24 ENCOUNTER — PREP FOR PROCEDURE (OUTPATIENT)
Dept: OTOLARYNGOLOGY | Facility: CLINIC | Age: 54
End: 2024-06-24
Payer: COMMERCIAL

## 2024-06-24 DIAGNOSIS — H02.531 UPPER EYELID RETRACTION OF RIGHT EYE: ICD-10-CM

## 2024-06-24 DIAGNOSIS — R29.810 FACIAL WEAKNESS: ICD-10-CM

## 2024-06-24 DIAGNOSIS — H02.201 LAGOPHTHALMOS OF RIGHT UPPER EYELID, UNSPECIFIED LAGOPHTHALMOS TYPE: ICD-10-CM

## 2024-06-24 DIAGNOSIS — M95.2 FRONTAL BONE DEFORMITY: ICD-10-CM

## 2024-07-10 PROCEDURE — RXMED WILLOW AMBULATORY MEDICATION CHARGE

## 2024-07-11 ENCOUNTER — PHARMACY VISIT (OUTPATIENT)
Dept: PHARMACY | Facility: CLINIC | Age: 54
End: 2024-07-11
Payer: MEDICAID

## 2024-08-02 ENCOUNTER — PHARMACY VISIT (OUTPATIENT)
Dept: PHARMACY | Facility: CLINIC | Age: 54
End: 2024-08-02
Payer: MEDICAID

## 2024-08-02 PROCEDURE — RXMED WILLOW AMBULATORY MEDICATION CHARGE

## 2024-08-05 ENCOUNTER — ANESTHESIA EVENT (OUTPATIENT)
Dept: OPERATING ROOM | Facility: HOSPITAL | Age: 54
End: 2024-08-05
Payer: COMMERCIAL

## 2024-08-06 ENCOUNTER — HOSPITAL ENCOUNTER (OUTPATIENT)
Facility: HOSPITAL | Age: 54
Setting detail: OUTPATIENT SURGERY
Discharge: HOME | End: 2024-08-06
Attending: OTOLARYNGOLOGY | Admitting: OTOLARYNGOLOGY
Payer: COMMERCIAL

## 2024-08-06 ENCOUNTER — ANESTHESIA (OUTPATIENT)
Dept: OPERATING ROOM | Facility: HOSPITAL | Age: 54
End: 2024-08-06
Payer: COMMERCIAL

## 2024-08-06 VITALS
SYSTOLIC BLOOD PRESSURE: 168 MMHG | TEMPERATURE: 96.8 F | BODY MASS INDEX: 32.92 KG/M2 | WEIGHT: 174.38 LBS | DIASTOLIC BLOOD PRESSURE: 97 MMHG | HEIGHT: 61 IN | HEART RATE: 75 BPM | OXYGEN SATURATION: 96 % | RESPIRATION RATE: 18 BRPM

## 2024-08-06 DIAGNOSIS — H02.231 PARALYTIC LAGOPHTHALMOS OF RIGHT UPPER EYELID: Primary | ICD-10-CM

## 2024-08-06 PROCEDURE — RXMED WILLOW AMBULATORY MEDICATION CHARGE

## 2024-08-06 PROCEDURE — 2720000007 HC OR 272 NO HCPCS: Performed by: OTOLARYNGOLOGY

## 2024-08-06 RX ORDER — TRAMADOL HYDROCHLORIDE 50 MG/1
50 TABLET ORAL EVERY 6 HOURS PRN
Qty: 15 TABLET | Refills: 0 | Status: SHIPPED | OUTPATIENT
Start: 2024-08-06 | End: 2024-08-11

## 2024-08-06 RX ORDER — ERYTHROMYCIN 5 MG/G
OINTMENT OPHTHALMIC NIGHTLY
Qty: 3.5 G | Refills: 1 | Status: SHIPPED | OUTPATIENT
Start: 2024-08-06

## 2024-08-06 NOTE — DISCHARGE INSTRUCTIONS
FACIAL PLASTIC SURGERY POSTOPERATIVE INSTRUCTIONS    EYELID AND EYEBROW SURGERY    At Home after Surgery:    Dressing: If you have a browlift, your forehead will be wrapped in a large composite dressing.  This stays on overnight and you will need to cut it off/remove it the next day. We then recommend a headband style wrap to support your brow lift for one additional week.    Head Elevation: Keep your head elevated (the height of 2 pillows is appropriate) for 1 week to help with swelling.     Ice: Apply cold compresses to the forehead/eyes, up to 20 minutes of each hour while awake after surgery, for the first 48 hours.  This will help reduce swelling and bruising.     Shower: You may start showering 24 hours after surgery.  Do not let the shower spray hit your incisions directly and do not soak your face in water.  Allow soapy water to run all over the incisions.  Towel blot your face and neck gently after your shower.    Incision Care: Apply ophthalmic ointment or aquaphor four times a day.  The incision will heal most optimally if it is kept moist and clean.  You can use hydrogen peroxide on Q-tips to gently clean any crusts.  Do not rub but gently dab the incision to clean.  If you have eyelid surgery, special drops and ointments will be prescribed; please use as directed.    Medications: Take the medications as prescribed.  You can take Tylenol in addition to the narcotic pain medication prescribed.  Resume all home medications the night of surgery unless otherwise directed.  Avoid aspirin and NSAIDs for one week after surgery.      Activity: Resume normal activities of daily living, as you feel able.  However, avoid strenuous activity and heavy lifting (more than 10 lbs) for 3 weeks after surgery.  Light activity such as walking may be resumed after 1 week after surgery.  Sport activities may be resumed 1 month after surgery.    Seek Medical Attention: Call the office or seek medical attention if you develop  fever greater than 101 degrees, a painful area of fullness and bruising, excessive pain that is not well-controlled, skin rash, visual disturbances, or other unusual symptoms.     Follow-Up Care:    First Appointment: You will return one week after surgery for suture removal.  This appointment may be with the nurse, fellow, or your physician, and will be scheduled prior to surgery.       Additional Appointments: Ideally, your physician would like to see you about 4-6 weeks after surgery to examine the healing. After this, the follow-up is quite variable, and depends on how you are doing and feeling. Often, this means visits at about 3-6 months, and 9-12 months after surgery to follow your healing process.  Please call the office at any time if you have any questions or concerns and would like to be seen sooner than your next scheduled visit.

## 2024-08-06 NOTE — PRE-PROCEDURE NOTE
Operative case cancelled today in preop after discussion with Dr. Best and patient.  Patient states she discontinued her levothyroxine several months ago after she ran out of this medication. Most recent TSH on 4/16/24 was 36.12.  She is assymptomatic.  She will contact her prescribing provider for renewal of prescription.  Patient expressed disappointment for surgery cancellation today, but also understanding that this is a patient safety issue.

## 2024-08-07 ENCOUNTER — PHARMACY VISIT (OUTPATIENT)
Dept: PHARMACY | Facility: CLINIC | Age: 54
End: 2024-08-07
Payer: MEDICAID

## 2024-08-08 NOTE — PROGRESS NOTES
Facial Plastic & Reconstructive Surgery    8/12/24  POV s/p nasal surgery on ***    Doing well, endorses improved breathing bilaterally  Continues salt water sprays and ointment to nares    Nasal splint removed  Septum midline  Nasal airway patent  Incisions c/d/I    Plan:  Continue nasal saline sprays and ointment to nares  RTC 4-6 months or sooner if needed     Jovanni Jiang PA-C

## 2024-08-12 ENCOUNTER — APPOINTMENT (OUTPATIENT)
Dept: OTOLARYNGOLOGY | Facility: CLINIC | Age: 54
End: 2024-08-12
Payer: COMMERCIAL

## 2024-08-24 ENCOUNTER — APPOINTMENT (OUTPATIENT)
Dept: RADIOLOGY | Facility: HOSPITAL | Age: 54
End: 2024-08-24
Payer: COMMERCIAL

## 2024-08-26 ASSESSMENT — ENCOUNTER SYMPTOMS
FEVER: 0
NUMBNESS: 1
DIARRHEA: 0
SHORTNESS OF BREATH: 0
SORE THROAT: 0
TROUBLE SWALLOWING: 1
LEG SWELLING: 0
VOMITING: 0
COUGH: 0
LIGHT-HEADEDNESS: 0
ABDOMINAL PAIN: 0
CHILLS: 0
ARTHRALGIAS: 1
HEADACHES: 0
NAUSEA: 0
CONSTIPATION: 0

## 2024-08-26 NOTE — PROGRESS NOTES
Patient ID: Pamella Rose is a 54 y.o. female.  Diagnosis: Right maxillary sinus  squamous cell carcinoma  Staging: [id number]  Date of Diagnosis:    Providers:   ENT: Dr. Jay Delgado  MedOn: Dr. Kyunghee Burkitt / BABITA Hollins  RadOnc: Dr. Nadege Horton      Surgery:  2/25/22: Right maxillary biopsy showed poorly differentiated Squamous Cell Carcinoma with focal keratinization involving squmous mucosa.   3/20/22: Right Neck Exploration,  right scapular tip  free flap; maxilla reconstruction with placement of hardware   3/22/23: Right Mandibular Debridement w/ Dr. Delgado.  2/22/24: Debridement of mandible    Prior Therapies:  5/11 - 6/22/22: Completed concurrent chemoXRT w/ 7 weeks of Carboplatin (2mg/AUC/dose) + Paclitaxel (45mg/m2) and 66gy VMAT in 30 fx.     Oncologic Issues  Osteoradionecrosis  Dysphagia  Mastication difficulties     ONCOLOGIC HISTORY  - 2/4/22: Pt initially presented to Dr. Delgado with swelling of the upper gum area that didn’t improve with abx. Referred by who Dentist did an in-office xray and was concerned about a mass in the sinus  - 2/4/22 FNA showed some atypical epithelioid cells, but no definitive diagnosis  - 2/18/22: CT neck with contrast showed a large destructive mass centered in the right maxillary sinus with marked osseous destruction and extension beyond the sinus walls into the nasal cavity, hard palate, and periantral soft tissues. There is also  likely subtle extension into the inferior right orbit and possibly involvement of the infraorbital canal concerning for perineural extension of neoplasm. There are several enlarged and heterogeneous lymph nodes bilaterally compatible with metastasis.  - 2/25/22 Right maxillary biopsy showed poorly differentiated Squamous Cell Carcinoma with focal keratinization involving squmous mucosa.   - 3/14/22: PET/CT showed subcentimeter RUL without significant metabolic activity, needs to be monitored with CT chest with contrast.  - 3/16/22  Surgery: Right hemimaxillectomy without orbital exenteration, bilateral  NE (Right leverl 1-4, Left level 1-3); DL,  bronchoscopy, e sophagoscopy; Rigid Nasal Endoscopy with biopsy; Open tracheostomy with Dr. Delgado. Left scapular tip and latissimus muscle free flap , maxillary reconstruction with placement of hardware, Orbital floor reconstruction  with placement of hardware with Dr. Al.  - 3/16/22 Pathology revealed 4 LNs (with metastatic adenosquamous carcinoma) and 1 with thyroid carcinoma (subcapsular micrometastasis), level Right 1B, bilateral level 2/3 lymph nodes, with PAOLA.  Patient will need US thyroid in future and potential total thyroidectomy.  - 3/22/22 Surgery:  Right Neck Exploration, r ight scapular tip free flap; m axilla reconstruction with placement of hardware with Dr. Al   - 3/25/22 Tumor Board: Recently found to have right maxilla mass underwent surgical resection, findings c/w adeno-squamous carcinoma, also found to have  metastatic thyroid carcinoma in left level 2/3 lymph node. Rec: Adjuvant chemoradiation; total thyroidectomy  - 5/11/ - 6/22: Completed concurrent chemoXRT w/ 7 weeks of Carboplatin (2mg/AUC/dose) + Paclitaxel (45mg/m2) and 66gy VMAT in 30 fx.   - 3/22/23: Right Mandibular Debridement w/ Dr. Delgado.  - 7/10/23 FNA of a left cervical LN. Path showed polymorphous lymphoid tissue with no carcinoma identified.       Admission:  - 6/23 - 6/28/23: Admitted for Acute Otitis externa and febrile neutropenia. Received broad spectrum IV antibiotics. 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      Past Medical History:   Past Medical History:  No date: Anxiety  No date: CKD (chronic kidney disease)      Comment:  Stage 3  No date: Depression  No date: Dysphagia  09/25/2023: Encounter for electrocardiogram      Comment:  Normal sinus rhythm Normal ECG  No date: GERD (gastroesophageal reflux disease)  No date:  History of sinus cancer      Comment:  Right maxillary sinus  squamous cell carcinoma s/p                concurrent chemoXRT  : Hx of tracheostomy  No date: Hyperaldosteronism (Multi)  No date: Hyperlipidemia  No date: Hypertension  No date: Hypothyroidism  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  2. Normal global left ventricular systolic function.  3. Moderate to severe LVH and hypertensive response to exercise without any regional wall motion abnormalities at 61% MPHR.     SECTION, LOW TRANSVERSE      COLONOSCOPY      CT AORTA AND BILATERAL ILIOFEMORAL RUNOFF ANGIOGRAM W AND/OR WO IV CONTRAST  2023    CT AORTA AND BILATERAL ILIOFEMORAL RUNOFF ANGIOGRAM W AND/OR WO IV CONTRAST 2023 [facility] CT    ECHOCARDIOGRAM 2 D M MODE PANEL  2016    The left ventricular systolic function is normal with a 60-65% ejection fraction.  Spectral Doppler shows an impaired relaxation pattern of left ventricular diastolic filling. There is moderate to severe concentric left ventricular hypertrophy.    IR INTERVENTION VENOUS SAMPLING  2021    IR INTERVENTION VENOUS SAMPLING 2021 [facility] [place] [facility]    IR INTERVENTION VENOUS SAMPLING  2021    IR INTERVENTION VENOUS SAMPLING 2021 [facility] [place] [facility]    OTHER SURGICAL HISTORY      right maxillectomy scapula free flap    OTHER SURGICAL HISTORY      right mandibular debridement      Family History:    Family History   Problem Relation Name Age of Onset    Hypertension Mother      Other (CABG) Mother      Hypertension Father      Lung cancer Father       Colon cancer Other grandfather      Family Oncology History:    Cancer-related family history includes Colon cancer in an other family member; Lung cancer in her father.  Social History:    Social History     Tobacco Use    Smoking status: Every Day     Current packs/day: 0.25     Types: Cigarettes     Passive exposure: Never    Smokeless tobacco: Never   Vaping Use    Vaping status: Never Used   Substance Use Topics    Alcohol use: Never    Drug use: Not Currently     Types: Marijuana        Chief Complaint: Squamous Cell Carcinoma of Maxillary Sinus    HPI  Pamella Rose is a 54 y.o. female with h/o tight maxillary sinus squamous cell carcinoma. [id number].  S/p right hemimaxillectomy, left scapula flap with revision and right scapula tip free flap, bilateral ND. Completed concurrent chemoXRT w/ 7 cycles of Carbo/Taxol and 66gy VMAT on 6/22/22.     Interval History  Patient presents for 2 year 2 months follow up and reports the following:    Patient reports she's feeling around her baseline.   Doing swallowing exercises but still has dysphagia,  report food sometimes gets stuck.  Dr. Delgado talked about possible esophageal dilation down the road.   Recall she was scheduled to have surgery on 8/5 but her TSH is severely elevated so surgery was aborted. She has been compliant with Levothyroxine 137mcg QAM.   She c/o tightness in the jaw and right jaw pain. Unable to open mouth very much and jaw fatigues with chewing.   Energy improved since starting levothyroxine consistently  She also needs refill of HTN meds and Omeprazole.     ROS  Review of Systems   Constitutional:  Negative for chills and fever.   HENT:   Positive for hearing loss and trouble swallowing. Negative for lump/mass, mouth sores, nosebleeds and sore throat.    Respiratory:  Negative for cough and shortness of breath.    Cardiovascular:  Negative for chest pain and leg swelling.   Gastrointestinal:  Negative for abdominal pain, constipation, diarrhea,  nausea and vomiting.   Musculoskeletal:  Positive for arthralgias (shoulder pain).   Skin:  Negative for rash.   Neurological:  Positive for numbness. Negative for headaches and light-headedness.       Allergies  No Known Allergies     Medications  Current Outpatient Medications   Medication Instructions    acetaminophen (Tylenol) 160 mg/5 mL liquid 31.2 mL (1,000 mg) by g-tube route every 8 hours for 14 days.    amLODIPine (NORVASC) 10 mg, g-tube, Daily    cyclobenzaprine (FLEXERIL) 5 mg, g-tube, Nightly    erythromycin (Romycin) 5 mg/gram (0.5 %) ophthalmic ointment Right Eye, Nightly, Apply Amount per Dose: 0.5 inch (~1 cm) per dose.    famotidine (PEPCID) 20 mg, g-tube, Daily    FLUoxetine (PROZAC) 20 mg, g-tube, Daily    gabapentin 250 mg, g-tube, Daily    hydrALAZINE (APRESOLINE) 50 mg, g-tube, 3 times daily    levothyroxine (SYNTHROID, LEVOXYL) 137 mcg, g-tube, Daily before breakfast    lisinopril 40 mg, g-tube, Daily    lubricating eye drops ophthalmic solution 1 drop, Right Eye, 4 times daily    metoprolol succinate XL (TOPROL-XL) 50 mg, oral, Daily, Do not crush or chew. HOLD UNTIL CLEARED FOR ORAL DIET    metoprolol tartrate (LOPRESSOR) 25 mg, oral, Daily    omeprazole (PRILOSEC) 40 mg, oral, Daily    rosuvastatin (CRESTOR) 10 mg, g-tube, Daily    spironolactone (ALDACTONE) 25 mg, g-tube, Once, 1 tablet DAILY (route: oral)    terazosin (HYTRIN) 5 mg, g-tube, Nightly        OBJECTIVE:  VS:  BP (!) 145/100 (BP Location: Left arm, Patient Position: Sitting, BP Cuff Size: Adult)   Pulse 84   Temp 35.7 °C (96.3 °F) (Temporal)   Wt 77.6 kg (171 lb)   SpO2 95%   BMI 32.31 kg/m²   Daily Weight  08/28/24 : 77.6 kg (171 lb)  08/06/24 : 79.1 kg (174 lb 6.1 oz)  05/03/24 : 79.8 kg (176 lb)  04/16/24 : 79.4 kg (175 lb)  03/15/24 : 79.4 kg (175 lb)  02/22/24 : 82.1 kg (181 lb)  02/08/24 : 77.1 kg (170 lb)      Physical Exam  Constitutional:       Appearance: Normal appearance. She is well-developed.   HENT:       Head: Normocephalic and atraumatic.      Comments: + facial swelling bilaterally cheek, R>L  Neck stiffness R>L  Post treatment changes in right side of face     Right Ear: External ear normal. No tenderness.      Left Ear: External ear normal. No tenderness.      Nose: Nose normal.      Mouth/Throat:      Mouth: Mucous membranes are moist. No injury or oral lesions.      Tongue: No lesions.      Pharynx: Oropharynx is clear.      Comments: Edentulous, trismus with limited oral opening  Eyes:      Extraocular Movements: Extraocular movements intact.      Conjunctiva/sclera: Conjunctivae normal.      Pupils: Pupils are equal, round, and reactive to light.      Comments: Ectropion in right eye   Neck:      Thyroid: No thyroid mass.   Cardiovascular:      Rate and Rhythm: Normal rate and regular rhythm.   Pulmonary:      Effort: Pulmonary effort is normal. No respiratory distress.      Breath sounds: Normal breath sounds.   Abdominal:      General: Bowel sounds are normal. There is no distension or abdominal bruit.      Palpations: Abdomen is soft. There is no mass.      Tenderness: There is no abdominal tenderness.   Musculoskeletal:         General: Normal range of motion.      Cervical back: Normal range of motion and neck supple. No signs of trauma. Normal range of motion.      Right lower leg: No edema.      Left lower leg: No edema.   Lymphadenopathy:      Cervical: No cervical adenopathy.      Upper Body:      Right upper body: No axillary adenopathy.      Left upper body: No axillary adenopathy.   Skin:     General: Skin is warm and dry.      Findings: No lesion or rash.   Neurological:      General: No focal deficit present.      Mental Status: She is alert and oriented to person, place, and time.      Gait: Gait is intact.   Psychiatric:         Mood and Affect: Mood and affect normal.         Behavior: Behavior is cooperative.         Diagnostic Results     Labs  Results from last 7 days   Lab Units  08/28/24  1241   WBC AUTO x10*3/uL 4.1*   HEMOGLOBIN g/dL 13.9   HEMATOCRIT % 44.4   PLATELETS AUTO x10*3/uL 139*   NEUTROS ABS x10*3/uL 2.05   LYMPHS ABS AUTO x10*3/uL 1.58   MONOS ABS AUTO x10*3/uL 0.34   EOS ABS AUTO x10*3/uL 0.12   NEUTROS PCT AUTO % 49.8   LYMPHS PCT AUTO % 38.3   MONOS PCT AUTO % 8.3   EOS PCT AUTO % 2.9        Results from last 7 days   Lab Units 08/28/24  1241   GLUCOSE mg/dL 118*   SODIUM mmol/L 139   POTASSIUM mmol/L 4.4   CHLORIDE mmol/L 100   CO2 mmol/L 29   BUN mg/dL 27*   CREATININE mg/dL 1.43*   EGFR mL/min/1.73m*2 43*   CALCIUM mg/dL 10.0   MAGNESIUM mg/dL 2.33   ALBUMIN g/dL 4.2   PROTEIN TOTAL g/dL 7.8   BILIRUBIN TOTAL mg/dL 0.4   ALK PHOS U/L 52   ALT U/L 10   AST U/L 11       Results from last 7 days   Lab Units 08/28/24  1241   TSH mIU/L 1.85                        Images      Assessment/Plan     ASSESSMENT  Pamella Rose is a 54 y.o. female with h/o tight maxillary sinus squamous cell carcinoma. [id number].  S/p right hemimaxillectomy, left scapula flap with revision and right scapula tip free flap, bilateral ND. From 5/11/ - 6/22/22 she completed concurrent chemoXRT w/ 7 weeks of Carboplatin (2mg/AUC/dose) + Paclitaxel (45mg/m2) and 66gy VMAT  in 30 fx.      # Right maxillary sinus squamous cell carcinoma  - 3/16/22 Right hemimaxillectomy, left scapula flap with revision and right scapula tip free flap, b/l ND with negative margins.   - Since surgery she has pain in right side of face, tingling and swelling in right cheek, swelling has improved this week. Following Supp Onc (Sarah NETTLES) for pain management.  - 6/22/22 Completed concurrent chemoXRT w/ 7 weeks of Carboplatin (2mg/AUC/dose) + Paclitaxel (45mg/m2) and 66gy VMAT in 30 fx  - Unfortunately was hospitalized 6/26 -6/28 for febrile neutropenia and otitis externa, d/c home on oral abx, and eye and ear drops.   - 11/18/22: post treatment PET/CT showed good response, increased metabolic activity along the right maxillary  bone graft, likely reactive. Patient was seen by Dr. Delgado in [place], per  his exam, she has MAYELA.  - 3/16/23: ongoing lymphedema of right face/mandible and lateral nose with neuropathy and pressure pain.  Pain is managed by supp onc. She is scheduled to have procedure next week to  work on exposed bone around mandible.  - 3/22/23: s/p right mandibular debridement  - 4/26/23 MRI Orbits showed post op changes w/ interval decrease in the enhancement in the surgical bed and surround soft tissues.   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  - 7/10/23 FNA of left parotic nodule showed polymorphous lymphoid tissue w/ no carcinoma identified.   - 7/20/23: Patient is stable overall, has chronic SEs from chemoradiation including chronic facial and neck swelling/lymphedema, right shoulder contracture with decreases ROM, weakness and right arm neuropathy. Has chronic fatigue. Patient presented me with  Medical Source Statement to complete from her .   - 10/5/23: Patient with about 1.5 cm open wound under right mandible at junction with neck. Has been draining for 2 weeks, drainage is mucoid mixed with blood. No fevers/chills, no erythema. Unfortunately no labs today, however does not appear to be infected. She does have wound care appts set up but unclear if patient is going to these appts consistently as she cites issues with transportation.   - 2/9/24: Patient admitted for osteoradionecrosis a few months ago. She did not continue HPO therapy.   - 4/16/24: Patient had mandibular debridement and reconstruction with Dr. Delgado on 2/22. She has swelling, pain and neuropathy in the right mandible. She does have muscle tightness in the right neck with spasms. She has stopped smoking a couple months ago.  - 8/28/24: Patient feeling at her baseline. WA snot able to have ectropion repair on 8/5 due to uncontrolled hypothyroidism. Still has pain/stiffness in right face.  "Trouble swallowing. Chronic SE are stable.     # GERD  - Acid reflux, controlling with Omeprazole 20mg. Still having symptoms on this dose. Will increase to 40mg    # Trismus  - Pt with trismus and jaw fatigue, worse since surgery in February.     # Dysphagia  - Ongoing dysphagia, though pt feels this may be worse as she feel food gets stuck more frequently. Currently eating a regular diet. Will follow up w/ Dr Delgado regarding possible esophageal dilation.     # Thyroid carcinoma   - 3/16/22 Pathology showing 1 left level 2/3 LN with thyroid carcinoma (subcapsular micrometastasis)  - 6/24/22 CT Neck showed Similar appearance of the thyroid gland with sub-centimeter hypodensities in the right greater than left lobe.  - 11/18/22 restaging scan did not find anything remarkable in the thyroid  - Dr. Delgado is following this, per his 6/16 FUV \"since this was an incidental micromets, and there are no significant nodules after chemoradiation, would just observe with serial ultrasounds at this time; pt has significantly scarred neck and likely no residual  disease in the thyroid\"    # Hypothroidism  - 8/11/22 TSH 3.74  - 12/1/22: TSH 17.20  - 2/17/23: started synthroid 50mcg on 2/17/23 based on 12/1/22 TSH level.  - 4/13/23 TSH 14.91  - 6/22/23 TSH 29.7  - 7/20/23 TSH 32.9. Levothyroxine increased to 88mcg. Recheck TSH/T4 on 8/25  - 10/5/23: Patient reports she has not been taking levothyroxine. Patient did not go to lab today to have TSH redrawn. Levothyroxine 100mg was sent to [facility] pharmacy for patient to  today.   - 2/8/24: TSH 15.32, T4 1.01. Patient to increase Levothyroxine to 112mcg.   - 4/16/24: TSH 36.12, T4 1.01. Will increase to 137mcg.   - 8/28/24: TSH 1.85. Will continue levothyroxine 137mcg    # Right eye ptosis/epiphora/possible conjunctivitis  - Right eye ptosis and epiphora, likely related to prior surgery/radiation. No vision change/eye pain  - redness/itchiness in right eye for a while. continue treat " her for potential conjunctivitis with Floxin otic drop  - Will follow up with Dr. Best regarding right ectropion repair     # Facial lymphedema  - Right side of face and neck. Swelling fluctuates. Start twice a week lymphedema therapy on 10/31/22. Has had good improvement on the swelling though still has lymphedema at baseline.  - Advised that she continue to do daily lymphedema exercises      # Right ear hearing loss  - Patient c/o subjective hearing loss in right ear, ear still feels full, sounds muffled 3 months after treatment  - Was referred to Audiology and had audiogram scheduled 11/11/22, however patient missed this appointment.   - 10/5/23: Patient again endorsed worsening hearing. Referral made again for Audiology and patient is agreeable.       # CKD, stage 3  - Following with Dr. Ana Lee  - Today Cr 1.21, GFR 53, around her baseline. Encourage patient to keep up with hydration via PEG and PO.     # Depression  - Following Sushil Bryan ([facility]), was started on Duloxetine 20mg and Olanzapine 2.5mg     # Hypertension  - 148/97, HR 73. Patient has PCP at [facility], reports she's on Lisinopril, Amlodipine, Spironolactone, Metoprolol, and is on a statin. She reports missing some medications here and there. Advised patient to taking her BP meds daily.   - BP wnl in clinic today  - 8/28: Patient requested refill of Lisinopril 40mg, Metoprolol 25mg every day for her HTN. She will follow up with her PCP at [facility] for future refills and management.     PLAN:  -- RTC 3 months on 12/4/24 w/ MedOnc, labs cbc, cmp, tsh/t4  -- 11/11 FUV w/ Dr. Delgado  -- Continue Levothyroxine 137mcg.   -- Increase Omeprazole to 40mg QD for GERD  -- Lisinopril 40mg QD, Metop 25mg every day, [facility] day supply [facility]. Pt to follow up with PCP for hypertension management.  -- Patient to reach out to Dr. Best regarding ectropion repair.

## 2024-08-28 ENCOUNTER — OFFICE VISIT (OUTPATIENT)
Dept: HEMATOLOGY/ONCOLOGY | Facility: HOSPITAL | Age: 54
End: 2024-08-28
Payer: COMMERCIAL

## 2024-08-28 ENCOUNTER — PHARMACY VISIT (OUTPATIENT)
Dept: PHARMACY | Facility: CLINIC | Age: 54
End: 2024-08-28
Payer: MEDICAID

## 2024-08-28 ENCOUNTER — LAB (OUTPATIENT)
Dept: LAB | Facility: HOSPITAL | Age: 54
End: 2024-08-28
Payer: COMMERCIAL

## 2024-08-28 ENCOUNTER — TELEPHONE (OUTPATIENT)
Dept: HEMATOLOGY/ONCOLOGY | Facility: HOSPITAL | Age: 54
End: 2024-08-28
Payer: COMMERCIAL

## 2024-08-28 VITALS
OXYGEN SATURATION: 95 % | BODY MASS INDEX: 32.31 KG/M2 | HEART RATE: 84 BPM | WEIGHT: 171 LBS | TEMPERATURE: 96.3 F | SYSTOLIC BLOOD PRESSURE: 145 MMHG | DIASTOLIC BLOOD PRESSURE: 100 MMHG

## 2024-08-28 DIAGNOSIS — E03.9 HYPOTHYROIDISM, UNSPECIFIED TYPE: ICD-10-CM

## 2024-08-28 DIAGNOSIS — Z08 ENCOUNTER FOR FOLLOW-UP SURVEILLANCE OF HEAD AND NECK CANCER: ICD-10-CM

## 2024-08-28 DIAGNOSIS — H02.102 ECTROPION OF RIGHT LOWER EYELID, UNSPECIFIED ECTROPION TYPE: ICD-10-CM

## 2024-08-28 DIAGNOSIS — I10 HYPERTENSION, UNSPECIFIED TYPE: ICD-10-CM

## 2024-08-28 DIAGNOSIS — K21.9 GASTROESOPHAGEAL REFLUX DISEASE, UNSPECIFIED WHETHER ESOPHAGITIS PRESENT: ICD-10-CM

## 2024-08-28 DIAGNOSIS — I12.9 HYPERTENSIVE RENAL DISEASE: ICD-10-CM

## 2024-08-28 DIAGNOSIS — Z85.89 ENCOUNTER FOR FOLLOW-UP SURVEILLANCE OF HEAD AND NECK CANCER: ICD-10-CM

## 2024-08-28 DIAGNOSIS — C41.1 CANCER OF INFERIOR MAXILLA (MULTI): Primary | ICD-10-CM

## 2024-08-28 DIAGNOSIS — R13.10 DYSPHAGIA, UNSPECIFIED TYPE: ICD-10-CM

## 2024-08-28 DIAGNOSIS — C31.0 MAXILLARY SINUS CANCER (MULTI): ICD-10-CM

## 2024-08-28 LAB
ALBUMIN SERPL BCP-MCNC: 4.2 G/DL (ref 3.4–5)
ALP SERPL-CCNC: 52 U/L (ref 33–110)
ALT SERPL W P-5'-P-CCNC: 10 U/L (ref 7–45)
ANION GAP SERPL CALC-SCNC: 14 MMOL/L (ref 10–20)
AST SERPL W P-5'-P-CCNC: 11 U/L (ref 9–39)
BASOPHILS # BLD AUTO: 0.02 X10*3/UL (ref 0–0.1)
BASOPHILS NFR BLD AUTO: 0.5 %
BILIRUB SERPL-MCNC: 0.4 MG/DL (ref 0–1.2)
BUN SERPL-MCNC: 27 MG/DL (ref 6–23)
CALCIUM SERPL-MCNC: 10 MG/DL (ref 8.6–10.3)
CHLORIDE SERPL-SCNC: 100 MMOL/L (ref 98–107)
CO2 SERPL-SCNC: 29 MMOL/L (ref 21–32)
CREAT SERPL-MCNC: 1.43 MG/DL (ref 0.5–1.05)
EGFRCR SERPLBLD CKD-EPI 2021: 43 ML/MIN/1.73M*2
EOSINOPHIL # BLD AUTO: 0.12 X10*3/UL (ref 0–0.7)
EOSINOPHIL NFR BLD AUTO: 2.9 %
ERYTHROCYTE [DISTWIDTH] IN BLOOD BY AUTOMATED COUNT: 16 % (ref 11.5–14.5)
GLUCOSE SERPL-MCNC: 118 MG/DL (ref 74–99)
HCT VFR BLD AUTO: 44.4 % (ref 36–46)
HGB BLD-MCNC: 13.9 G/DL (ref 12–16)
IMM GRANULOCYTES # BLD AUTO: 0.01 X10*3/UL (ref 0–0.7)
IMM GRANULOCYTES NFR BLD AUTO: 0.2 % (ref 0–0.9)
LYMPHOCYTES # BLD AUTO: 1.58 X10*3/UL (ref 1.2–4.8)
LYMPHOCYTES NFR BLD AUTO: 38.3 %
MAGNESIUM SERPL-MCNC: 2.33 MG/DL (ref 1.6–2.4)
MCH RBC QN AUTO: 30.2 PG (ref 26–34)
MCHC RBC AUTO-ENTMCNC: 31.3 G/DL (ref 32–36)
MCV RBC AUTO: 96 FL (ref 80–100)
MONOCYTES # BLD AUTO: 0.34 X10*3/UL (ref 0.1–1)
MONOCYTES NFR BLD AUTO: 8.3 %
NEUTROPHILS # BLD AUTO: 2.05 X10*3/UL (ref 1.2–7.7)
NEUTROPHILS NFR BLD AUTO: 49.8 %
NRBC BLD-RTO: 0 /100 WBCS (ref 0–0)
PLATELET # BLD AUTO: 139 X10*3/UL (ref 150–450)
POTASSIUM SERPL-SCNC: 4.4 MMOL/L (ref 3.5–5.3)
PROT SERPL-MCNC: 7.8 G/DL (ref 6.4–8.2)
RBC # BLD AUTO: 4.61 X10*6/UL (ref 4–5.2)
SODIUM SERPL-SCNC: 139 MMOL/L (ref 136–145)
TSH SERPL-ACNC: 1.85 MIU/L (ref 0.44–3.98)
WBC # BLD AUTO: 4.1 X10*3/UL (ref 4.4–11.3)

## 2024-08-28 PROCEDURE — RXMED WILLOW AMBULATORY MEDICATION CHARGE

## 2024-08-28 PROCEDURE — 84443 ASSAY THYROID STIM HORMONE: CPT

## 2024-08-28 PROCEDURE — 83735 ASSAY OF MAGNESIUM: CPT

## 2024-08-28 PROCEDURE — 80053 COMPREHEN METABOLIC PANEL: CPT

## 2024-08-28 PROCEDURE — 3080F DIAST BP >= 90 MM HG: CPT | Performed by: STUDENT IN AN ORGANIZED HEALTH CARE EDUCATION/TRAINING PROGRAM

## 2024-08-28 PROCEDURE — 99215 OFFICE O/P EST HI 40 MIN: CPT | Performed by: STUDENT IN AN ORGANIZED HEALTH CARE EDUCATION/TRAINING PROGRAM

## 2024-08-28 PROCEDURE — 36415 COLL VENOUS BLD VENIPUNCTURE: CPT

## 2024-08-28 PROCEDURE — 3077F SYST BP >= 140 MM HG: CPT | Performed by: STUDENT IN AN ORGANIZED HEALTH CARE EDUCATION/TRAINING PROGRAM

## 2024-08-28 PROCEDURE — 85025 COMPLETE CBC W/AUTO DIFF WBC: CPT

## 2024-08-28 RX ORDER — METOPROLOL TARTRATE 25 MG/1
25 TABLET, FILM COATED ORAL DAILY
Qty: 30 TABLET | Refills: 0 | Status: SHIPPED | OUTPATIENT
Start: 2024-08-28 | End: 2024-09-27

## 2024-08-28 RX ORDER — OMEPRAZOLE 40 MG/1
40 CAPSULE, DELAYED RELEASE ORAL DAILY
Qty: 90 CAPSULE | Refills: 2 | Status: SHIPPED | OUTPATIENT
Start: 2024-08-28 | End: 2025-05-25

## 2024-08-28 RX ORDER — LISINOPRIL 40 MG/1
40 TABLET ORAL DAILY
Qty: 30 TABLET | Refills: 0 | Status: SHIPPED | OUTPATIENT
Start: 2024-08-28 | End: 2024-09-27

## 2024-08-28 ASSESSMENT — PAIN SCALES - GENERAL: PAINLEVEL: 0-NO PAIN

## 2024-08-28 NOTE — PROGRESS NOTES
PSYCHOSOCIAL ASSESSMENT     Demographic Information  Pamella Rose  1970  [id number]  Preferred Name: Ms. Rose Can  Pronouns: she/her  Assessment Type:  psychosocial assessment  Date of assessment: 08/28/24  Provider(s):  Rohit Boles PA-C  Diagnosis: Right maxillary sinus  squamous cell carcinoma   Person(s) present during assessment: Keyona Can LSW, Danica LSW  Primary language: English  Interpretive services used: No                  Living Environment/Support Systems  Partner Status: Single  Children: Three children, Esther (36), Vance (33), and Shaheed (32)  Support systems: Children   Primary caregiver: Self  Current Living Situation: Apartment Rent  Resides with: Self  Concerns with Housing Environment: None  Comments:     Safety  Patient safe at home? Yes  History of Domestic Violence: No     Functional Status  Functional status: Independent  Patient currently ambulates: Independently  Patient has following equipment: None  Other physical health issues that the patient is experiencing: None  What supports are in place to assist the patient: None  Transportation: She has unreliable transportation, family cannot assist.   Comments:         Finances/Insurance  Insurance: [employer]  Does the patient have any pending insurance applications: No   Hospital Financial Assistance: None  Patient's income source: SSI/SSDI  Work History: Stated that she has done many things over the years.    History: No  Background  Food Insecurity: No   Does the patient have any financial concerns: No   Any difficulties affording medications? No   Applicable [place]: No   Comments: She shared that she makes $942 a month from [employer], but explains that she does not have any issues affording medications, food, or other resources at this time.      Advance Directives  No Advance Directives- Declined additional information   Health Care Agent Status:Not Activated  Health Care Agent, When applicable:  "  Comments: Provided additional HCPOA information, Pamella denied information. Shared that [place]'s law regarding hierarchy would result in Esther being called in the event of an emergency, Pamella agreed.     Legal Involvement  Relevant current or previous legal concerns: None.     Holiness or Spiritual Identity  Comments: None noted, but stated that she prays at home.     Mental Health  Active SI/HI: No  Mental Health Diagnosis, if applicable:   Mood: \"thumbs up\"  Concerns relating to substance use (including alcohol/tobacco): None  Patient identified coping skills: Watching horror movies/shows, crime podcasts.  Learning Preferences: Being told  Cognitive Comments: None  Relevant Providers: None  Comments:       Assessment  Potential Barriers to Care:  Transportation  Patient Strengths:  Healthy Coping Skills and Self-Advocate    Plan  Referrals: N/A  Applications: Transportation  Other: N/A    Narrative:     Ms. Rose is a pleasant 54 y.o with right maxillary sinus squamous cell carcinoma. She explained that previous to her diagnosis, she held many different jobs and was a . She shared that she has three children, Esther, Vance, and Christopher all who she is close with. She shared that she is not interested in completing a HCPOA form, but shared that her oldest, Esther is her decision maker. She stated at this time she is not having any issues financially or emotionally. She explained that she rents and lives alone with her cat. She is independent and requires no additional needs at this time. Ms. Rose expressed that she does not identify with any particular Yazidism, but prays on her own. To cope with stress or anything that makes her feel less than ideal, she likes to watch horror movies and shows and listen to crime podcasts. She prefers to be told information and described her mood as good. She did explain that she has issues with transportation as her family cannot help. SW to assist in " transportation needs. Explained [employer]trip and how it works.     Will check with insurance to see if there is a transportation benefit available. Will continue to follow.

## 2024-08-28 NOTE — TELEPHONE ENCOUNTER
Called patient to tell her TSH level, that is normal. She stated to use her home phone number, instead of cell phone number.

## 2024-08-28 NOTE — PROGRESS NOTES
PSYCHOSOCIAL ASSESSMENT     Demographic Information  Pamella Rose  1970  [id number]  Preferred Name: Ms. Rose Can  Pronouns: she/her  Assessment Type:  psychosocial assessment  Date of assessment: 08/28/24  Provider(s):  Rohit Boles PA-C  Diagnosis: Right maxillary sinus  squamous cell carcinoma   Person(s) present during assessment: Keyona Can LSW, Danica LSW  Primary language: English  Interpretive services used: No                  Living Environment/Support Systems  Partner Status: Single  Children: Three children, Esther (36), Vance (33), and Shaheed (32)  Support systems: Children   Primary caregiver: Self  Current Living Situation: Apartment Rent  Resides with: Self  Concerns with Housing Environment: None  Comments:     Safety  Patient safe at home? Yes  History of Domestic Violence: No     Functional Status  Functional status: Independent  Patient currently ambulates: Independently  Patient has following equipment: None  Other physical health issues that the patient is experiencing: None  What supports are in place to assist the patient: None  Transportation: She has unreliable transportation, family cannot assist.   Comments:         Finances/Insurance  Insurance: Carealejandra  Does the patient have any pending insurance applications: No   Hospital Financial Assistance: None  Patient's income source: SSI/SSDI  Work History: Stated that she has done many things over the years.    History: No  Background  Food Insecurity: No   Does the patient have any financial concerns: No   Any difficulties affording medications? No   Applicable [place]: No   Comments: She shared that she makes $942 a month from [employer], but explains that she does not have any issues affording medications, food, or other resources at this time.      Advance Directives  No Advance Directives- Declined additional information   Health Care Agent Status:Not Activated  Health Care Agent, When applicable:  "  Comments: Provided additional HCPOA information, Pamella denied information. Shared that [place]'s law regarding hierarchy would result in Esther being called in the event of an emergency, Pamella agreed.     Legal Involvement  Relevant current or previous legal concerns: None.     Taoism or Spiritual Identity  Comments: None noted, but stated that she prays at home.     Mental Health  Active SI/HI: No  Mental Health Diagnosis, if applicable:   Mood: \"thumbs up\"  Concerns relating to substance use (including alcohol/tobacco): None  Patient identified coping skills: Watching horror movies/shows, crime podcasts.  Learning Preferences: Being told  Cognitive Comments: None  Relevant Providers: None  Comments:       Assessment  Potential Barriers to Care:  Transportation  Patient Strengths:  Healthy Coping Skills and Self-Advocate    Plan  Referrals: N/A  Applications: Transportation  Other: N/A    Narrative:     Ms. Rose is a pleasant 54 y.o with right maxillary sinus squamous cell carcinoma. She explained that previous to her diagnosis, she held many different jobs and was a . She shared that she has three children, Esther, Vance, and Christopher all who she is close with. She shared that she is not interested in completing a HCPOA form, but shared that her oldest, Esther is her decision maker. She stated at this time she is not having any issues financially or emotionally. She explained that she rents and lives alone with her cat. She is independent and requires no additional needs at this time. Ms. Rose expressed that she does not identify with any particular Christianity, but prays on her own. To cope with stress or anything that makes her feel less than ideal, she likes to watch horror movies and shows and listen to crime podcasts. She prefers to be told information and described her mood as good. She did explain that she has issues with transportation as her family cannot help. SW to assist in " transportation needs. Explained [employer] and how it works.     Will check with insurance to see if there is a transportation benefit available. Will continue to follow.

## 2024-08-28 NOTE — PATIENT INSTRUCTIONS
We're waiting on the thyroid labs to result, I will call you once we have the labs back.   If the Thyroid level is normal, you can call Dr. Best's office to schedule your surgery.     You will schedule a follow up visit with Dr. Delgado and you can discuss throat stretching with him.     You have a 30 day supply of Metoprolol 25mg daily, 30 day Lisinopril 40mg daily, 90 day supply of Omeprazole 40mg daily (with 2 refills), Levothyroxine 137mcg (with 8 refills) at [facility].     I will see you in 3 months with labs, in first week of December.

## 2024-08-29 ENCOUNTER — SOCIAL WORK (OUTPATIENT)
Dept: CASE MANAGEMENT | Facility: HOSPITAL | Age: 54
End: 2024-08-29
Payer: COMMERCIAL

## 2024-08-29 NOTE — PROGRESS NOTES
PSYCHOSOCIAL ASSESSMENT     Demographic Information  Pamella Rose                     1970                  [id number]  Preferred Name: Ms. Rose Can  Pronouns: she/her  Assessment Type:  psychosocial assessment  Date of assessment: 08/28/24  Provider(s):  Rohit Boles PA-C  Diagnosis: Right maxillary sinus  squamous cell carcinoma   Person(s) present during assessment: Keyona Can LSW, Danica LSW  Primary language: English  Interpretive services used: No                   Living Environment/Support Systems  Partner Status: Single  Children: Three children, Esther (36), Vance (33), and Shaheed (32)  Support systems: Children   Primary caregiver: Self  Current Living Situation: Apartment Rent  Resides with: Self  Concerns with Housing Environment: None  Comments:     Safety  Patient safe at home? Yes  History of Domestic Violence: No     Functional Status  Functional status: Independent  Patient currently ambulates: Independently  Patient has following equipment: None  Other physical health issues that the patient is experiencing: None  What supports are in place to assist the patient: None  Transportation: She has unreliable transportation, family cannot assist.   Comments:         Finances/Insurance  Insurance: Carealejandra  Does the patient have any pending insurance applications: No   Hospital Financial Assistance: None  Patient's income source: SSI/SSDI  Work History: Stated that she has done many things over the years.    History: No  Background  Food Insecurity: No   Does the patient have any financial concerns: No   Any difficulties affording medications? No   Applicable [place]: No   Comments: She shared that she makes $942 a month from [employer], but explains that she does not have any issues affording medications, food, or other resources at this time.      Advance Directives  No Advance Directives- Declined additional information   Health Care Agent Status:Not Activated  Health  "Care Agent, When applicable:   Comments: Provided additional HCPOA information, Pamella denied information. Shared that [place]'s law regarding hierarchy would result in Esther being called in the event of an emergency, Pamella agreed.      Legal Involvement  Relevant current or previous legal concerns: None.     Scientologist or Spiritual Identity  Comments: None noted, but stated that she prays at home.      Mental Health  Active SI/HI: No  Mental Health Diagnosis, if applicable:   Mood: \"thumbs up\"  Concerns relating to substance use (including alcohol/tobacco): None  Patient identified coping skills: Watching horror movies/shows, crime podcasts.  Learning Preferences: Being told  Cognitive Comments: None  Relevant Providers: None  Comments:         Assessment  Potential Barriers to Care:  Transportation  Patient Strengths:  Healthy Coping Skills and Self-Advocate     Plan  Referrals: N/A  Applications: Transportation  Other: N/A     Narrative:      Ms. Rose is a pleasant 54 y.o with right maxillary sinus squamous cell carcinoma. She explained that previous to her diagnosis, she held many different jobs and was a . She shared that she has three children, Esther, Vance, and Christmichelleer all who she is close with. She shared that she is not interested in completing a HCPOA form, but shared that her oldest, Esther is her decision maker. She stated at this time she is not having any issues financially or emotionally. She explained that she rents and lives alone with her cat. She is independent and requires no additional needs at this time. Ms. Rose expressed that she does not identify with any particular Anabaptist, but prays on her own. To cope with stress or anything that makes her feel less than ideal, she likes to watch horror movies and shows and listen to crime podcasts. She prefers to be told information and described her mood as good. She did explain that she has issues with transportation as her " family cannot help. SW to assist in transportation needs. Explained [employer] and how it works.      Will check with insurance to see if there is a transportation benefit available. Will continue to follow.

## 2024-09-05 PROCEDURE — RXMED WILLOW AMBULATORY MEDICATION CHARGE

## 2024-09-06 DIAGNOSIS — H02.202 LAGOPHTHALMOS OF RIGHT LOWER EYELID, UNSPECIFIED LAGOPHTHALMOS TYPE: ICD-10-CM

## 2024-09-06 DIAGNOSIS — M95.2 HEAD DEFORMITY: ICD-10-CM

## 2024-09-06 DIAGNOSIS — R29.810 FACIAL WEAKNESS: ICD-10-CM

## 2024-09-06 DIAGNOSIS — H02.533 LID RETRACTION, RIGHT: ICD-10-CM

## 2024-09-09 ENCOUNTER — SOCIAL WORK (OUTPATIENT)
Dept: CASE MANAGEMENT | Facility: HOSPITAL | Age: 54
End: 2024-09-09
Payer: COMMERCIAL

## 2024-09-09 ENCOUNTER — HOSPITAL ENCOUNTER (OUTPATIENT)
Dept: RADIOLOGY | Facility: HOSPITAL | Age: 54
Discharge: HOME | End: 2024-09-09
Payer: COMMERCIAL

## 2024-09-09 VITALS — BODY MASS INDEX: 32.3 KG/M2 | WEIGHT: 171.08 LBS | HEIGHT: 61 IN

## 2024-09-09 DIAGNOSIS — Z12.31 ENCOUNTER FOR SCREENING MAMMOGRAM FOR MALIGNANT NEOPLASM OF BREAST: ICD-10-CM

## 2024-09-09 PROCEDURE — 77063 BREAST TOMOSYNTHESIS BI: CPT | Performed by: STUDENT IN AN ORGANIZED HEALTH CARE EDUCATION/TRAINING PROGRAM

## 2024-09-09 PROCEDURE — 77067 SCR MAMMO BI INCL CAD: CPT | Performed by: STUDENT IN AN ORGANIZED HEALTH CARE EDUCATION/TRAINING PROGRAM

## 2024-09-09 PROCEDURE — 77067 SCR MAMMO BI INCL CAD: CPT

## 2024-09-09 NOTE — PROGRESS NOTES
Coordinated transportation, through [employer], for patient from [facility] to her home.      Justine Franco, Social Work Student  FREDI Valladares

## 2024-09-10 ENCOUNTER — PHARMACY VISIT (OUTPATIENT)
Dept: PHARMACY | Facility: CLINIC | Age: 54
End: 2024-09-10
Payer: MEDICAID

## 2024-09-21 PROCEDURE — RXMED WILLOW AMBULATORY MEDICATION CHARGE

## 2024-09-24 ENCOUNTER — PHARMACY VISIT (OUTPATIENT)
Dept: PHARMACY | Facility: CLINIC | Age: 54
End: 2024-09-24
Payer: MEDICAID

## 2024-09-24 ENCOUNTER — HOSPITAL ENCOUNTER (OUTPATIENT)
Facility: HOSPITAL | Age: 54
Discharge: HOME | End: 2024-09-24
Attending: OTOLARYNGOLOGY | Admitting: OTOLARYNGOLOGY
Payer: COMMERCIAL

## 2024-09-24 ENCOUNTER — ANESTHESIA EVENT (OUTPATIENT)
Dept: OPERATING ROOM | Facility: HOSPITAL | Age: 54
End: 2024-09-24
Payer: COMMERCIAL

## 2024-09-24 ENCOUNTER — ANESTHESIA (OUTPATIENT)
Dept: OPERATING ROOM | Facility: HOSPITAL | Age: 54
End: 2024-09-24
Payer: COMMERCIAL

## 2024-09-24 VITALS
SYSTOLIC BLOOD PRESSURE: 140 MMHG | DIASTOLIC BLOOD PRESSURE: 81 MMHG | WEIGHT: 174.82 LBS | TEMPERATURE: 96.8 F | HEIGHT: 61 IN | HEART RATE: 80 BPM | BODY MASS INDEX: 33.01 KG/M2 | RESPIRATION RATE: 16 BRPM | OXYGEN SATURATION: 93 %

## 2024-09-24 DIAGNOSIS — M95.2 HEAD DEFORMITY: ICD-10-CM

## 2024-09-24 DIAGNOSIS — H02.531 UPPER EYELID RETRACTION OF RIGHT EYE: Primary | ICD-10-CM

## 2024-09-24 DIAGNOSIS — H02.202 LAGOPHTHALMOS OF RIGHT LOWER EYELID, UNSPECIFIED LAGOPHTHALMOS TYPE: ICD-10-CM

## 2024-09-24 DIAGNOSIS — G89.18 POSTOPERATIVE PAIN: ICD-10-CM

## 2024-09-24 DIAGNOSIS — H02.533 LID RETRACTION, RIGHT: ICD-10-CM

## 2024-09-24 DIAGNOSIS — R29.810 FACIAL WEAKNESS: ICD-10-CM

## 2024-09-24 PROCEDURE — 2500000001 HC RX 250 WO HCPCS SELF ADMINISTERED DRUGS (ALT 637 FOR MEDICARE OP): Mod: SE

## 2024-09-24 PROCEDURE — 2500000002 HC RX 250 W HCPCS SELF ADMINISTERED DRUGS (ALT 637 FOR MEDICARE OP, ALT 636 FOR OP/ED): Mod: SE | Performed by: ANESTHESIOLOGY

## 2024-09-24 PROCEDURE — 7100000001 HC RECOVERY ROOM TIME - INITIAL BASE CHARGE: Performed by: OTOLARYNGOLOGY

## 2024-09-24 PROCEDURE — 3700000002 HC GENERAL ANESTHESIA TIME - EACH INCREMENTAL 1 MINUTE: Performed by: OTOLARYNGOLOGY

## 2024-09-24 PROCEDURE — 7100000009 HC PHASE TWO TIME - INITIAL BASE CHARGE: Performed by: OTOLARYNGOLOGY

## 2024-09-24 PROCEDURE — 67875 CLOSURE OF EYELID BY SUTURE: CPT | Performed by: OTOLARYNGOLOGY

## 2024-09-24 PROCEDURE — 2500000004 HC RX 250 GENERAL PHARMACY W/ HCPCS (ALT 636 FOR OP/ED): Mod: SE | Performed by: ANESTHESIOLOGY

## 2024-09-24 PROCEDURE — 2720000007 HC OR 272 NO HCPCS: Performed by: OTOLARYNGOLOGY

## 2024-09-24 PROCEDURE — 3600000008 HC OR TIME - EACH INCREMENTAL 1 MINUTE - PROCEDURE LEVEL THREE: Performed by: OTOLARYNGOLOGY

## 2024-09-24 PROCEDURE — 15773 GRFG AUTOL FAT LIPO 25 CC/<: CPT | Performed by: OTOLARYNGOLOGY

## 2024-09-24 PROCEDURE — 67914 REPAIR EYELID DEFECT: CPT | Performed by: OTOLARYNGOLOGY

## 2024-09-24 PROCEDURE — 2500000005 HC RX 250 GENERAL PHARMACY W/O HCPCS: Mod: SE

## 2024-09-24 PROCEDURE — 2500000004 HC RX 250 GENERAL PHARMACY W/ HCPCS (ALT 636 FOR OP/ED): Mod: SE

## 2024-09-24 PROCEDURE — RXMED WILLOW AMBULATORY MEDICATION CHARGE

## 2024-09-24 PROCEDURE — 2500000004 HC RX 250 GENERAL PHARMACY W/ HCPCS (ALT 636 FOR OP/ED): Mod: SE | Performed by: OTOLARYNGOLOGY

## 2024-09-24 PROCEDURE — 3700000001 HC GENERAL ANESTHESIA TIME - INITIAL BASE CHARGE: Performed by: OTOLARYNGOLOGY

## 2024-09-24 PROCEDURE — 2500000005 HC RX 250 GENERAL PHARMACY W/O HCPCS: Mod: SE | Performed by: OTOLARYNGOLOGY

## 2024-09-24 PROCEDURE — 7100000010 HC PHASE TWO TIME - EACH INCREMENTAL 1 MINUTE: Performed by: OTOLARYNGOLOGY

## 2024-09-24 PROCEDURE — 2500000001 HC RX 250 WO HCPCS SELF ADMINISTERED DRUGS (ALT 637 FOR MEDICARE OP): Mod: SE | Performed by: OTOLARYNGOLOGY

## 2024-09-24 PROCEDURE — 3600000003 HC OR TIME - INITIAL BASE CHARGE - PROCEDURE LEVEL THREE: Performed by: OTOLARYNGOLOGY

## 2024-09-24 PROCEDURE — 1210000001 HC SEMI-PRIVATE ROOM DAILY

## 2024-09-24 PROCEDURE — 7100000002 HC RECOVERY ROOM TIME - EACH INCREMENTAL 1 MINUTE: Performed by: OTOLARYNGOLOGY

## 2024-09-24 RX ORDER — ESOMEPRAZOLE MAGNESIUM 20 MG/1
20 GRANULE, DELAYED RELEASE ORAL DAILY
Status: CANCELLED | OUTPATIENT
Start: 2024-09-24

## 2024-09-24 RX ORDER — ROCURONIUM BROMIDE 10 MG/ML
INJECTION, SOLUTION INTRAVENOUS AS NEEDED
Status: DISCONTINUED | OUTPATIENT
Start: 2024-09-24 | End: 2024-09-24

## 2024-09-24 RX ORDER — CEPHALEXIN 500 MG/1
500 CAPSULE ORAL 4 TIMES DAILY
Qty: 40 CAPSULE | Refills: 0 | Status: SHIPPED | OUTPATIENT
Start: 2024-09-24 | End: 2024-10-04

## 2024-09-24 RX ORDER — METOCLOPRAMIDE HYDROCHLORIDE 5 MG/ML
10 INJECTION INTRAMUSCULAR; INTRAVENOUS ONCE AS NEEDED
Status: DISCONTINUED | OUTPATIENT
Start: 2024-09-24 | End: 2024-09-24

## 2024-09-24 RX ORDER — FLUOXETINE 20 MG/1
20 TABLET ORAL DAILY
Status: CANCELLED | OUTPATIENT
Start: 2024-09-24

## 2024-09-24 RX ORDER — FENTANYL CITRATE 50 UG/ML
INJECTION, SOLUTION INTRAMUSCULAR; INTRAVENOUS AS NEEDED
Status: DISCONTINUED | OUTPATIENT
Start: 2024-09-24 | End: 2024-09-24

## 2024-09-24 RX ORDER — MIDAZOLAM HYDROCHLORIDE 1 MG/ML
1 INJECTION INTRAMUSCULAR; INTRAVENOUS ONCE AS NEEDED
Status: DISCONTINUED | OUTPATIENT
Start: 2024-09-24 | End: 2024-09-24

## 2024-09-24 RX ORDER — HYDROMORPHONE HYDROCHLORIDE 1 MG/ML
0.5 INJECTION, SOLUTION INTRAMUSCULAR; INTRAVENOUS; SUBCUTANEOUS EVERY 5 MIN PRN
Status: DISCONTINUED | OUTPATIENT
Start: 2024-09-24 | End: 2024-09-24

## 2024-09-24 RX ORDER — BACITRACIN 500 [USP'U]/G
OINTMENT TOPICAL AS NEEDED
Status: DISCONTINUED | OUTPATIENT
Start: 2024-09-24 | End: 2024-09-24 | Stop reason: HOSPADM

## 2024-09-24 RX ORDER — DROPERIDOL 2.5 MG/ML
0.62 INJECTION, SOLUTION INTRAMUSCULAR; INTRAVENOUS ONCE AS NEEDED
Status: DISCONTINUED | OUTPATIENT
Start: 2024-09-24 | End: 2024-09-24 | Stop reason: HOSPADM

## 2024-09-24 RX ORDER — DROPERIDOL 2.5 MG/ML
0.62 INJECTION, SOLUTION INTRAMUSCULAR; INTRAVENOUS ONCE AS NEEDED
Status: CANCELLED | OUTPATIENT
Start: 2024-09-24

## 2024-09-24 RX ORDER — ENOXAPARIN SODIUM 100 MG/ML
40 INJECTION SUBCUTANEOUS EVERY 24 HOURS
Status: CANCELLED | OUTPATIENT
Start: 2024-09-24

## 2024-09-24 RX ORDER — MOXIFLOXACIN 5 MG/ML
1 SOLUTION/ DROPS OPHTHALMIC EVERY 12 HOURS
Qty: 3 ML | Refills: 0 | Status: SHIPPED | OUTPATIENT
Start: 2024-09-24

## 2024-09-24 RX ORDER — METOCLOPRAMIDE HYDROCHLORIDE 5 MG/ML
10 INJECTION INTRAMUSCULAR; INTRAVENOUS ONCE AS NEEDED
Status: DISCONTINUED | OUTPATIENT
Start: 2024-09-24 | End: 2024-09-24 | Stop reason: HOSPADM

## 2024-09-24 RX ORDER — OXYCODONE AND ACETAMINOPHEN 5; 325 MG/1; MG/1
1 TABLET ORAL EVERY 4 HOURS PRN
Status: DISCONTINUED | OUTPATIENT
Start: 2024-09-24 | End: 2024-09-24

## 2024-09-24 RX ORDER — ONDANSETRON 4 MG/1
8 TABLET, FILM COATED ORAL EVERY 8 HOURS PRN
Qty: 20 TABLET | Refills: 0 | Status: SHIPPED | OUTPATIENT
Start: 2024-09-24 | End: 2024-09-29

## 2024-09-24 RX ORDER — HYDROMORPHONE HYDROCHLORIDE 1 MG/ML
0.2 INJECTION, SOLUTION INTRAMUSCULAR; INTRAVENOUS; SUBCUTANEOUS EVERY 5 MIN PRN
Status: CANCELLED | OUTPATIENT
Start: 2024-09-24

## 2024-09-24 RX ORDER — LIDOCAINE HCL/PF 100 MG/5ML
SYRINGE (ML) INTRAVENOUS AS NEEDED
Status: DISCONTINUED | OUTPATIENT
Start: 2024-09-24 | End: 2024-09-24

## 2024-09-24 RX ORDER — HYDROMORPHONE HYDROCHLORIDE 1 MG/ML
0.5 INJECTION, SOLUTION INTRAMUSCULAR; INTRAVENOUS; SUBCUTANEOUS EVERY 5 MIN PRN
Status: DISCONTINUED | OUTPATIENT
Start: 2024-09-24 | End: 2024-09-24 | Stop reason: HOSPADM

## 2024-09-24 RX ORDER — LABETALOL HYDROCHLORIDE 5 MG/ML
5 INJECTION, SOLUTION INTRAVENOUS ONCE AS NEEDED
Status: DISCONTINUED | OUTPATIENT
Start: 2024-09-24 | End: 2024-09-24 | Stop reason: HOSPADM

## 2024-09-24 RX ORDER — BACITRACIN 500 [USP'U]/G
OINTMENT OPHTHALMIC AS NEEDED
Status: DISCONTINUED | OUTPATIENT
Start: 2024-09-24 | End: 2024-09-24 | Stop reason: HOSPADM

## 2024-09-24 RX ORDER — SODIUM CHLORIDE 0.9 G/100ML
IRRIGANT IRRIGATION AS NEEDED
Status: DISCONTINUED | OUTPATIENT
Start: 2024-09-24 | End: 2024-09-24 | Stop reason: HOSPADM

## 2024-09-24 RX ORDER — HYDROMORPHONE HYDROCHLORIDE 1 MG/ML
0.5 INJECTION, SOLUTION INTRAMUSCULAR; INTRAVENOUS; SUBCUTANEOUS EVERY 5 MIN PRN
Status: CANCELLED | OUTPATIENT
Start: 2024-09-24

## 2024-09-24 RX ORDER — POLYETHYLENE GLYCOL 3350 17 G/17G
17 POWDER, FOR SOLUTION ORAL DAILY
Status: CANCELLED | OUTPATIENT
Start: 2024-09-24

## 2024-09-24 RX ORDER — CEFAZOLIN 1 G/1
INJECTION, POWDER, FOR SOLUTION INTRAVENOUS AS NEEDED
Status: DISCONTINUED | OUTPATIENT
Start: 2024-09-24 | End: 2024-09-24

## 2024-09-24 RX ORDER — DROPERIDOL 2.5 MG/ML
0.62 INJECTION, SOLUTION INTRAMUSCULAR; INTRAVENOUS ONCE AS NEEDED
Status: DISCONTINUED | OUTPATIENT
Start: 2024-09-24 | End: 2024-09-24

## 2024-09-24 RX ORDER — METOPROLOL SUCCINATE 50 MG/1
50 TABLET, EXTENDED RELEASE ORAL DAILY
Status: CANCELLED | OUTPATIENT
Start: 2024-09-24

## 2024-09-24 RX ORDER — ROSUVASTATIN CALCIUM 10 MG/1
10 TABLET, COATED ORAL DAILY
Status: CANCELLED | OUTPATIENT
Start: 2024-09-24

## 2024-09-24 RX ORDER — OXYCODONE HYDROCHLORIDE 5 MG/1
10 TABLET ORAL EVERY 4 HOURS PRN
Status: DISCONTINUED | OUTPATIENT
Start: 2024-09-24 | End: 2024-09-24

## 2024-09-24 RX ORDER — CHLORHEXIDINE GLUCONATE ORAL RINSE 1.2 MG/ML
15 SOLUTION DENTAL 2 TIMES DAILY
Status: CANCELLED | OUTPATIENT
Start: 2024-09-24

## 2024-09-24 RX ORDER — SPIRONOLACTONE 25 MG/1
25 TABLET ORAL ONCE
Status: CANCELLED | OUTPATIENT
Start: 2024-09-24 | End: 2024-09-24

## 2024-09-24 RX ORDER — LABETALOL HYDROCHLORIDE 5 MG/ML
5 INJECTION, SOLUTION INTRAVENOUS ONCE AS NEEDED
Status: CANCELLED | OUTPATIENT
Start: 2024-09-24

## 2024-09-24 RX ORDER — SODIUM CHLORIDE, SODIUM LACTATE, POTASSIUM CHLORIDE, CALCIUM CHLORIDE 600; 310; 30; 20 MG/100ML; MG/100ML; MG/100ML; MG/100ML
100 INJECTION, SOLUTION INTRAVENOUS CONTINUOUS
Status: DISCONTINUED | OUTPATIENT
Start: 2024-09-24 | End: 2024-09-24 | Stop reason: HOSPADM

## 2024-09-24 RX ORDER — ALBUTEROL SULFATE 0.83 MG/ML
2.5 SOLUTION RESPIRATORY (INHALATION) ONCE
Status: COMPLETED | OUTPATIENT
Start: 2024-09-24 | End: 2024-09-24

## 2024-09-24 RX ORDER — LIDOCAINE HYDROCHLORIDE 10 MG/ML
0.1 INJECTION, SOLUTION INFILTRATION; PERINEURAL ONCE
Status: CANCELLED | OUTPATIENT
Start: 2024-09-24 | End: 2024-09-24

## 2024-09-24 RX ORDER — ACETAMINOPHEN 325 MG/1
650 TABLET ORAL EVERY 4 HOURS PRN
Status: DISCONTINUED | OUTPATIENT
Start: 2024-09-24 | End: 2024-09-24 | Stop reason: HOSPADM

## 2024-09-24 RX ORDER — LABETALOL HYDROCHLORIDE 5 MG/ML
5 INJECTION, SOLUTION INTRAVENOUS ONCE AS NEEDED
Status: DISCONTINUED | OUTPATIENT
Start: 2024-09-24 | End: 2024-09-24

## 2024-09-24 RX ORDER — LIDOCAINE HYDROCHLORIDE 10 MG/ML
0.1 INJECTION, SOLUTION INFILTRATION; PERINEURAL ONCE
Status: DISCONTINUED | OUTPATIENT
Start: 2024-09-24 | End: 2024-09-24 | Stop reason: HOSPADM

## 2024-09-24 RX ORDER — ACETAMINOPHEN 325 MG/1
975 TABLET ORAL EVERY 8 HOURS SCHEDULED
Status: CANCELLED | OUTPATIENT
Start: 2024-09-24

## 2024-09-24 RX ORDER — PROPOFOL 10 MG/ML
INJECTION, EMULSION INTRAVENOUS AS NEEDED
Status: DISCONTINUED | OUTPATIENT
Start: 2024-09-24 | End: 2024-09-24

## 2024-09-24 RX ORDER — OXYCODONE AND ACETAMINOPHEN 5; 325 MG/1; MG/1
1 TABLET ORAL EVERY 4 HOURS PRN
Status: CANCELLED | OUTPATIENT
Start: 2024-09-24

## 2024-09-24 RX ORDER — ACETAMINOPHEN 325 MG/1
650 TABLET ORAL EVERY 4 HOURS PRN
Status: DISCONTINUED | OUTPATIENT
Start: 2024-09-24 | End: 2024-09-24

## 2024-09-24 RX ORDER — NALOXONE HYDROCHLORIDE 0.4 MG/ML
0.2 INJECTION, SOLUTION INTRAMUSCULAR; INTRAVENOUS; SUBCUTANEOUS EVERY 5 MIN PRN
Status: CANCELLED | OUTPATIENT
Start: 2024-09-24

## 2024-09-24 RX ORDER — ACETAMINOPHEN 325 MG/1
650 TABLET ORAL EVERY 4 HOURS PRN
Status: CANCELLED | OUTPATIENT
Start: 2024-09-24

## 2024-09-24 RX ORDER — AMLODIPINE BESYLATE 10 MG/1
10 TABLET ORAL DAILY
Status: CANCELLED | OUTPATIENT
Start: 2024-09-24

## 2024-09-24 RX ORDER — LIDOCAINE HYDROCHLORIDE 40 MG/ML
SOLUTION TOPICAL AS NEEDED
Status: DISCONTINUED | OUTPATIENT
Start: 2024-09-24 | End: 2024-09-24

## 2024-09-24 RX ORDER — OXYCODONE HYDROCHLORIDE 5 MG/1
10 TABLET ORAL EVERY 4 HOURS PRN
Status: DISCONTINUED | OUTPATIENT
Start: 2024-09-24 | End: 2024-09-24 | Stop reason: HOSPADM

## 2024-09-24 RX ORDER — HYDROMORPHONE HYDROCHLORIDE 1 MG/ML
0.2 INJECTION, SOLUTION INTRAMUSCULAR; INTRAVENOUS; SUBCUTANEOUS EVERY 5 MIN PRN
Status: DISCONTINUED | OUTPATIENT
Start: 2024-09-24 | End: 2024-09-24 | Stop reason: HOSPADM

## 2024-09-24 RX ORDER — OXYCODONE AND ACETAMINOPHEN 5; 325 MG/1; MG/1
1 TABLET ORAL EVERY 4 HOURS PRN
Status: DISCONTINUED | OUTPATIENT
Start: 2024-09-24 | End: 2024-09-24 | Stop reason: HOSPADM

## 2024-09-24 RX ORDER — ERYTHROMYCIN 5 MG/G
OINTMENT OPHTHALMIC NIGHTLY
Qty: 3.5 G | Refills: 3 | Status: SHIPPED | OUTPATIENT
Start: 2024-09-24 | End: 2024-10-24

## 2024-09-24 RX ORDER — DIPHENHYDRAMINE HYDROCHLORIDE 50 MG/ML
25 INJECTION INTRAMUSCULAR; INTRAVENOUS ONCE
Status: COMPLETED | OUTPATIENT
Start: 2024-09-24 | End: 2024-09-24

## 2024-09-24 RX ORDER — ACETAMINOPHEN 160 MG/5ML
1000 SOLUTION ORAL EVERY 8 HOURS SCHEDULED
Status: CANCELLED | OUTPATIENT
Start: 2024-09-24

## 2024-09-24 RX ORDER — HYDROMORPHONE HYDROCHLORIDE 1 MG/ML
INJECTION, SOLUTION INTRAMUSCULAR; INTRAVENOUS; SUBCUTANEOUS AS NEEDED
Status: DISCONTINUED | OUTPATIENT
Start: 2024-09-24 | End: 2024-09-24

## 2024-09-24 RX ORDER — METOPROLOL TARTRATE 25 MG/1
25 TABLET, FILM COATED ORAL DAILY
Status: CANCELLED | OUTPATIENT
Start: 2024-09-24

## 2024-09-24 RX ORDER — LISINOPRIL 40 MG/1
40 TABLET ORAL DAILY
Status: CANCELLED | OUTPATIENT
Start: 2024-09-24

## 2024-09-24 RX ORDER — OXYCODONE HYDROCHLORIDE 5 MG/1
10 TABLET ORAL EVERY 4 HOURS PRN
Status: CANCELLED | OUTPATIENT
Start: 2024-09-24

## 2024-09-24 RX ORDER — MIDAZOLAM HYDROCHLORIDE 1 MG/ML
INJECTION INTRAMUSCULAR; INTRAVENOUS AS NEEDED
Status: DISCONTINUED | OUTPATIENT
Start: 2024-09-24 | End: 2024-09-24

## 2024-09-24 RX ORDER — ONDANSETRON HYDROCHLORIDE 2 MG/ML
INJECTION, SOLUTION INTRAVENOUS AS NEEDED
Status: DISCONTINUED | OUTPATIENT
Start: 2024-09-24 | End: 2024-09-24

## 2024-09-24 RX ORDER — OXYCODONE HYDROCHLORIDE 5 MG/1
5 TABLET ORAL EVERY 6 HOURS PRN
Qty: 12 TABLET | Refills: 0 | Status: SHIPPED | OUTPATIENT
Start: 2024-09-24 | End: 2024-09-27

## 2024-09-24 RX ORDER — METOCLOPRAMIDE HYDROCHLORIDE 5 MG/ML
10 INJECTION INTRAMUSCULAR; INTRAVENOUS ONCE AS NEEDED
Status: CANCELLED | OUTPATIENT
Start: 2024-09-24

## 2024-09-24 RX ORDER — HYDROMORPHONE HYDROCHLORIDE 1 MG/ML
0.2 INJECTION, SOLUTION INTRAMUSCULAR; INTRAVENOUS; SUBCUTANEOUS EVERY 5 MIN PRN
Status: DISCONTINUED | OUTPATIENT
Start: 2024-09-24 | End: 2024-09-24

## 2024-09-24 RX ORDER — MIDAZOLAM HYDROCHLORIDE 1 MG/ML
1 INJECTION INTRAMUSCULAR; INTRAVENOUS ONCE AS NEEDED
Status: DISCONTINUED | OUTPATIENT
Start: 2024-09-24 | End: 2024-09-24 | Stop reason: HOSPADM

## 2024-09-24 RX ORDER — SODIUM CHLORIDE, SODIUM LACTATE, POTASSIUM CHLORIDE, CALCIUM CHLORIDE 600; 310; 30; 20 MG/100ML; MG/100ML; MG/100ML; MG/100ML
100 INJECTION, SOLUTION INTRAVENOUS CONTINUOUS
Status: CANCELLED | OUTPATIENT
Start: 2024-09-24

## 2024-09-24 RX ORDER — MIDAZOLAM HYDROCHLORIDE 1 MG/ML
1 INJECTION INTRAMUSCULAR; INTRAVENOUS ONCE AS NEEDED
Status: CANCELLED | OUTPATIENT
Start: 2024-09-24

## 2024-09-24 ASSESSMENT — COLUMBIA-SUICIDE SEVERITY RATING SCALE - C-SSRS
1. IN THE PAST MONTH, HAVE YOU WISHED YOU WERE DEAD OR WISHED YOU COULD GO TO SLEEP AND NOT WAKE UP?: NO
1. IN THE PAST MONTH, HAVE YOU WISHED YOU WERE DEAD OR WISHED YOU COULD GO TO SLEEP AND NOT WAKE UP?: NO
2. HAVE YOU ACTUALLY HAD ANY THOUGHTS OF KILLING YOURSELF?: NO
2. HAVE YOU ACTUALLY HAD ANY THOUGHTS OF KILLING YOURSELF?: NO
6. HAVE YOU EVER DONE ANYTHING, STARTED TO DO ANYTHING, OR PREPARED TO DO ANYTHING TO END YOUR LIFE?: NO
6. HAVE YOU EVER DONE ANYTHING, STARTED TO DO ANYTHING, OR PREPARED TO DO ANYTHING TO END YOUR LIFE?: NO

## 2024-09-24 ASSESSMENT — PAIN SCALES - GENERAL
PAINLEVEL_OUTOF10: 0 - NO PAIN
PAINLEVEL_OUTOF10: 0 - NO PAIN
PAINLEVEL_OUTOF10: 7
PAINLEVEL_OUTOF10: 0 - NO PAIN
PAINLEVEL_OUTOF10: 0 - NO PAIN
PAIN_LEVEL: 6
PAINLEVEL_OUTOF10: 0 - NO PAIN
PAINLEVEL_OUTOF10: 10 - WORST POSSIBLE PAIN
PAINLEVEL_OUTOF10: 7
PAINLEVEL_OUTOF10: 0 - NO PAIN
PAINLEVEL_OUTOF10: 4

## 2024-09-24 NOTE — OP NOTE
Right Lower Eyelid Repair and fat transfer from left thigh with liposuction and injection cannulas (R) Operative Note     Date: 2024  OR Location: [facility] OR    Name: Pamella Rose, : 1970, Age: 54 y.o., MRN: [id number], Sex: female    Diagnosis  Pre-op Diagnosis      * Head deformity [M95.2]     * Lid retraction, right [H02.533]     * Lagophthalmos of right lower eyelid, unspecified lagophthalmos type [H02.202]     * Facial weakness [R29.810] Post-op Diagnosis     * Head deformity [M95.2]     * Lid retraction, right [H02.533]     * Lagophthalmos of right lower eyelid, unspecified lagophthalmos type [H02.202]     * Facial weakness [R29.810]     Procedures  Right Lower Eyelid Repair and fat transfer from left thigh and abdomen with liposuction and injection cannulas  64646 - DE GRAFTING OF AUTOLOGOUS FAT BY LIPO 25 CC OR LESS  12760 - DE REPAIR ECTROPION SUTURE  48228 - DE FROST STITCH  Surgeons      * Jose Luis Best - Primary    Resident/Fellow/Other Assistant:  Surgeons and Role:     * Jovanni Jiang PA-C - K[facility]T First Assist    Procedure Summary  Anesthesia: General  ASA: ASA status not filed in the log.  Anesthesia Staff: Anesthesiologist: Pancho Bess MD  C-AA: SIMI Walls; SIMI Gray  Estimated Blood Loss: 10 mL  Intra-op Medications:   Administrations occurring from 0700 to 1010 on 24:   Medication Name Total Dose   balanced salts (BSS) intraocular solution 15 mL   sodium chloride 0.9 % irrigation solution 1,000 mL   EPINEPHrine (Adrenalin) 1 mg, lidocaine (Xylocaine) 20 mL, tranexamic acid (Cyklokapron) 1,000 mg in sodium chloride 0.9 % 100 mL irrigation Cannot be calculated   lidocaine-epinephrine (Xylocaine W/EPI) 1 %-1:100,000 9 mL, tranexamic acid (Cyklokapron) 100 mg in sodium chloride 0.9 % 0.01 mL irrigation 10 mL              Anesthesia Record               Intraprocedure I/O Totals       None           Specimen: No specimens collected     Staff:    Circulator: Janneth Hutchinsub Person: Ginafranco     Drains and/or Catheters:   Gastrostomy/Enterostomy PEG-jejunostomy LUQ (Active)   Drain Status Clamped 09/24/24 0635   Dressing Status Other (Comment) 09/24/24 0635       Findings:     Indications: Pamella Rose is an 54 y.o. female who is having surgery for Head deformity [M95.2]  Lid retraction, right [H02.533]  Lagophthalmos of right lower eyelid, unspecified lagophthalmos type [H02.202]  Facial weakness [R29.810].     The patient was seen in the preoperative area. The risks, benefits, complications, treatment options, non-operative alternatives, expected recovery and outcomes were discussed with the patient. The possibilities of reaction to medication, pulmonary aspiration, injury to surrounding structures, bleeding, recurrent infection, the need for additional procedures, failure to diagnose a condition, and creating a complication requiring transfusion or operation were discussed with the patient. The patient concurred with the proposed plan, giving informed consent.  The site of surgery was properly noted/marked if necessary per policy. The patient has been actively warmed in preoperative area. Preoperative antibiotics have been ordered and given within 1 hours of incision. Venous thrombosis prophylaxis have been ordered including bilateral sequential compression devices    Procedure Details:   The patient was brought to the operating room placed in the supine position.  Timeout was performed to verify patient and procedure.  General anesthesia was induced.  The patient was then prepped draped and positioned for the above procedure.  At the left thigh and left abdomen tumescent solution with lidocaine epinephrine TXA and saline was injected to prepare the site for fat harvest.  At the right I 1% lidocaine with epinephrine was injected at the right lower eyelid.  At the right lower eyelid a stab incision was made with a 11 blade lateral to the lateral canthus.   The cannulas of them were then used to dissect the lower eyelid scar tissue and elevate a precise pocket for placement of grafting material.  An 18-gauge spinal needle was utilized to break up scar tissue and prepare the defect for fat grafting.  At the left thigh and left abdomen a power assisted liposuction cannula was utilized to harvest approximately 20 cc of autologous fat.  This was then prepared off the table.  A combination of 2.4 mm and 1.4 mm filters were used to prepare the micro fat.  The micro fat was then injected along the right lower eyelid.  Approximately 6 cc of micro fat was injected in order to elevate the right lower eyelid and addressed the ectropion.  This was placed in multiple different segments of the lower eyelid.  Majority of this was placed laterally to achieve lateral elevation of the lower eyelid retraction.  The injection sites were then closed with 5-0 fast gut.  The harvest sites were then closed with 5-0 fast gut.  A Frost stitch was then placed with a 4-0 silk suture in order to suspend the lower eyelid to the upper eyebrow.  Telfa bolsters were placed to protect the skin.  This completed the goals of procedure and the patient was awoken from general anesthesia without complication.      Complications:  None; patient tolerated the procedure well.    Disposition: PACU - hemodynamically stable.  Condition: stable     Additional Details: No qualified resident was available and Jovanni Jiang served as my full and primary assistant for the entire case.    Attending Attestation: I was present and scrubbed for the entire procedure.    Jose Luis Best  Phone Number: [phone]

## 2024-09-24 NOTE — ANESTHESIA POSTPROCEDURE EVALUATION
Patient: Pamella Rose    Procedure Summary       Date: 09/24/24 Room / Location: [facility] OR 05 / [facility] OR    Anesthesia Start: 0736 Anesthesia Stop: 1001    Procedure: Right Lower Eyelid Repair and fat transfer from left thigh with liposuction and injection cannulas (Right) Diagnosis:       Head deformity      Lid retraction, right      Lagophthalmos of right lower eyelid, unspecified lagophthalmos type      Facial weakness      (Head deformity [M95.2])      (Lid retraction, right [H02.533])      (Lagophthalmos of right lower eyelid, unspecified lagophthalmos type [H02.202])      (Facial weakness [R29.810])    Surgeons: Jose Luis Best MD Responsible Provider: Pancho Bess MD    Anesthesia Type: general ASA Status: Not recorded            Anesthesia Type: general    Vitals Value Taken Time   /85 09/24/24 0954   Temp 36 °C (96.8 °F) 09/24/24 0952   Pulse 74 09/24/24 1000   Resp 12 09/24/24 1000   SpO2 77 % 09/24/24 1000   Vitals shown include unfiled device data.    Anesthesia Post Evaluation    Patient location during evaluation: PACU  Patient participation: complete - patient participated  Level of consciousness: awake  Pain score: 6  Pain management: inadequate  Airway patency: stridor (plan for racemic epi)  Cardiovascular status: acceptable  Respiratory status: acceptable  Hydration status: acceptable  Postoperative Nausea and Vomiting: none        Encounter Notable Events   Notable Event Outcome Phase Comment   Airway obstruction, partial (incl.stridor, snoring)  Postprocedure, before discharge

## 2024-09-24 NOTE — ANESTHESIA PREPROCEDURE EVALUATION
Patient: Pamella Rose    Procedure Information       Anesthesia Start Date/Time: 09/24/24 0736    Procedure: right lower Eyelid Repair and fat transfer with liposuction and injection cannulas (Right)    Location: [facility] OR  / [facility] OR    Surgeons: Jose Luis Best MD            Relevant Problems   Cardiac   (+) Chest pain   (+) Hypertension      Neuro   (+) Anxiety disorder   (+) Lumbar radiculopathy   (+) Major depressive disorder, single episode, moderate (Multi)      GI   (+) Dysphagia   (+) Gastroesophageal reflux disease      Endocrine   (+) Hypothyroidism   (+) Thyroid carcinoma (Multi)      HEENT   (+) Maxillary sinus cancer (Multi)       Clinical information reviewed:   Tobacco  Allergies  Meds   Med Hx  Surg Hx  OB Status  Fam Hx  Soc   Hx        NPO Detail:  NPO/Void Status  Carbohydrate Drink Given Prior to Surgery? : N  Date of Last Liquid: 09/24/24  Time of Last Liquid: 0500  Date of Last Solid: 09/23/24  Time of Last Solid: 2300  Last Intake Type: Clear fluids  Time of Last Void: 0530         Physical Exam    Airway  Mallampati: III  TM distance: >3 FB  Neck ROM: full     Cardiovascular - normal exam     Dental   (+) lower dentures, upper dentures     Pulmonary - normal exam     Abdominal        Anesthesia Plan    History of general anesthesia?: yes  History of complications of general anesthesia?: no    ASA 3     general     intravenous induction   Anesthetic plan and risks discussed with patient.    Plan discussed with CAA and attending.

## 2024-09-24 NOTE — ANESTHESIA PROCEDURE NOTES
Airway  Date/Time: 9/24/2024 7:45 AM  Urgency: elective    Airway not difficult    Staffing  Performed: SIMI   Authorized by: Pancho Bess MD    Performed by: SIMI Walls  Patient location during procedure: OR    Indications and Patient Condition  Indications for airway management: anesthesia  Spontaneous Ventilation: absent  Sedation level: deep  Preoxygenated: yes  MILS not maintained throughout  Mask difficulty assessment: 1 - vent by mask    Final Airway Details  Final airway type: endotracheal airway      Successful airway: ETT  Cuffed: yes   Successful intubation technique: video laryngoscopy  Facilitating devices/methods: intubating stylet  Endotracheal tube insertion site: oral  Blade type: Medium videoscope blade.  ETT size (mm): 7.0  Cormack-Lehane Classification: grade I - full view of glottis  Placement verified by: capnometry   Cuff volume (mL): 5  Measured from: lips  ETT to lips (cm): 21  Number of attempts at approach: 1  Number of other approaches attempted: 0    Additional Comments  Intubation atraumatic. Small mouth opening and face/neck tightness from previous radiation and facial surgeries; glidescope recommended. 7.0 ETT used, however 6.5 ETT may be more appropriate d/t narrow glottic opening/trachea.

## 2024-09-24 NOTE — ANESTHESIA PROCEDURE NOTES
Peripheral IV  Date/Time: 9/24/2024 7:49 AM      Placement  Needle size: 20 G  Laterality: right  Location: hand  Local anesthetic: none  Site prep: alcohol  Technique: anatomical landmarks  Attempts: 1

## 2024-09-24 NOTE — ADDENDUM NOTE
Addendum  created 09/24/24 1050 by SIMI Walls    Intraprocedure Event edited, Intraprocedure Meds edited, Intraprocedure Staff edited, Narcotic reconciliation edited

## 2024-09-24 NOTE — H&P
History Of Present Illness  Pamella Rose is a 54 y.o. female presenting with right lower eyelid retraction, here today for right lower Eyelid Repair and fat transfer with liposuction and injection cannulas.     Past Medical History  Past Medical History:   Diagnosis Date    Anxiety     CKD (chronic kidney disease)     Stage 3    Depression     Dysphagia     Encounter for electrocardiogram 2023    Normal sinus rhythm Normal ECG    GERD (gastroesophageal reflux disease)     History of sinus cancer     Right maxillary sinus  squamous cell carcinoma s/p concurrent chemoXRT    Hx of tracheostomy     Hyperaldosteronism (Multi)     Hyperlipidemia     Hypertension     Hypothyroidism     Osteoradionecrosis of mandible 2024    ENT: [facility], LOV 24    Proteinuria     Nep:    Thyroid cancer (Multi)     metastasized from primary site       Surgical History  Past Surgical History:   Procedure Laterality Date    CARDIOVASCULAR STRESS TEST  2016    The resting ejection fraction was estimated at 65% with a peak exercise ejection fraction estimated at 85%.  2. Normal global left ventricular systolic function.  3. Moderate to severe LVH and hypertensive response to exercise without any regional wall motion abnormalities at 61% MPHR.     SECTION, LOW TRANSVERSE      COLONOSCOPY      CT AORTA AND BILATERAL ILIOFEMORAL RUNOFF ANGIOGRAM W AND/OR WO IV CONTRAST  2023    CT AORTA AND BILATERAL ILIOFEMORAL RUNOFF ANGIOGRAM W AND/OR WO IV CONTRAST 2023 [facility] CT    ECHOCARDIOGRAM 2 D M MODE PANEL  2016    The left ventricular systolic function is normal with a 60-65% ejection fraction.  Spectral Doppler shows an impaired relaxation pattern of left ventricular diastolic filling. There is moderate to severe concentric left ventricular hypertrophy.    IR INTERVENTION VENOUS SAMPLING  2021    IR INTERVENTION VEN[facility]PLING [facility]    IR INTERVENTION VENOUS  "SAMPLING  09/14/2021    IR INTERVENTION VENOUS SAMPLING 9/14/2021 [facility]    OTHER SURGICAL HISTORY      right maxillectomy scapula free flap    OTHER SURGICAL HISTORY      right mandibular debridement        Social History  She reports that she has been smoking cigarettes. She started smoking about 39 years ago. She has a 9.9 pack-year smoking history. She has never been exposed to tobacco smoke. She has never used smokeless tobacco. She reports current alcohol use. She reports current drug use. Drug: Marijuana.    Family History  Family History   Problem Relation Name Age of Onset    Hypertension Mother      Other (CABG) Mother      Hypertension Father      Lung cancer Father      Colon cancer Other grandfather         Allergies  Patient has no known allergies.    ROS negative except what is otherwise specified in the HPI.     HENT:      Head: Normocephalic and atraumatic.   Eyes:      Conjunctiva/sclera: Conjunctivae normal. EOM intact. Right upper eyelid lagopthalmos. Right lower eyelid retraction.  Cardiovascular:      Rate and Rhythm: Normal rate and regular rhythm.   Pulmonary:      Effort: Pulmonary effort is normal. No respiratory distress.      Breath sounds: Normal breath sounds.   Abdominal:      General: Bowel sounds are normal.      Palpations: Abdomen is soft.   Musculoskeletal:      Cervical back: Normal range of motion and neck supple.   Skin:     General: Skin is warm and dry.   Neurological:      Mental Status: Alert and oriented to person, place, and time.      Cranial Nerves: No cranial nerve deficit.      Coordination: Coordination normal.   Psychiatric:         Mood and Affect: Affect normal.       Last Recorded Vitals  Blood pressure 129/87, pulse 76, temperature 36.3 °C (97.3 °F), temperature source Tympanic, resp. rate 16, height 1.55 m (5' 1.02\"), weight 79.3 kg (174 lb 13.2 oz), SpO2 99%.    Relevant Results         Assessment/Plan   Assessment & Plan  Facial weakness    Head " deformity    Lid retraction, right    Lagophthalmos of right lower eyelid    PLAN: Right lower Eyelid Repair and fat transfer with liposuction and injection cannulas        I spent 15 minutes in the professional and overall care of this patient.      Jovanni Jiang PA-C

## 2024-09-24 NOTE — DISCHARGE INSTRUCTIONS
FACIAL PLASTIC SURGERY POSTOPERATIVE INSTRUCTIONS    EYELID SURGERY    At Home after Surgery:    Head Elevation: Keep your head elevated (the height of 2 pillows is appropriate) for 1 week to help with swelling.     Ice: Apply cold compresses to the forehead/eyes, up to 20 minutes of each hour while awake after surgery, for the first 48 hours.  This will help reduce swelling and bruising.     Shower: You may start showering 24 hours after surgery.  Do not let the shower spray hit your incisions directly and do not soak your face in water.  Allow soapy water to run all over the incisions.  Towel blot your face and neck gently after your shower.    Incision Care: Apply ophthalmic ointment or aquaphor four times a day.  The incision will heal most optimally if it is kept moist and clean.  You can use hydrogen peroxide on Q-tips to gently clean any crusts.  Do not rub but gently dab the incision to clean.  If you have eyelid surgery, special drops and ointments will be prescribed; please use as directed.    Medications: Take the medications as prescribed.  You can take Tylenol in addition to the narcotic pain medication prescribed.  Resume all home medications the night of surgery unless otherwise directed.  Avoid aspirin and NSAIDs for one week after surgery.   If you choose to take Arnica and/or Bromelain, start it the night before surgery and take it for 1 week total.      Activity: Resume normal activities of daily living, as you feel able.  However, avoid strenuous activity and heavy lifting (more than 10 lbs) for 3 weeks after surgery.  Light activity such as walking may be resumed after 1 week after surgery.  Sport activities may be resumed 1 month after surgery.    Seek Medical Attention: Call the office or seek medical attention if you develop fever greater than 101 degrees, a painful area of fullness and bruising, excessive pain that is not well-controlled, skin rash, visual disturbances, or other unusual  symptoms.     Follow-Up Care:    First Appointment: You will return one week after surgery for suture removal.  This appointment may be with the nurse, fellow, or your physician, and will be scheduled prior to surgery.       Additional Appointments: Ideally, your physician would like to see you about 4-6 weeks after surgery to examine the healing. After this, the follow-up is quite variable, and depends on how you are doing and feeling. Often, this means visits at about 3-6 months, and 9-12 months after surgery to follow your healing process.  Please call the office at any time if you have any questions or concerns and would like to be seen sooner than your next scheduled visit.

## 2024-09-25 ENCOUNTER — PATIENT OUTREACH (OUTPATIENT)
Dept: CARE COORDINATION | Facility: CLINIC | Age: 54
End: 2024-09-25
Payer: COMMERCIAL

## 2024-09-25 ENCOUNTER — PHARMACY VISIT (OUTPATIENT)
Dept: PHARMACY | Facility: CLINIC | Age: 54
End: 2024-09-25
Payer: MEDICAID

## 2024-09-25 ENCOUNTER — TELEPHONE (OUTPATIENT)
Dept: OTOLARYNGOLOGY | Facility: CLINIC | Age: 54
End: 2024-09-25
Payer: COMMERCIAL

## 2024-09-25 NOTE — PROGRESS NOTES
Chart review complete for CM.  Patient discharged on 9/24/24: Simple Surgery with expected LOS (same day), well established with Hem Onc for follow up.    Fabby Peguero RN, [facility]  Accountable Care Organization Operations Office  [facility] [id number]  Phone [phone]

## 2024-09-25 NOTE — TELEPHONE ENCOUNTER
Called Pamella to check on the status of his recovery. Patient is POD 1 right lower eyelid repair and fat transfer from left thigh with liposuction and injection cannulas performed by Dr. Best at [facility] on 9/24/24. Patient is doing well, reported some discomfort to her left leg but tolerable with the prescribed pain medication. Patient reminded of post op appointment scheduled on 9/30/24. Dr. Best updated on patient status and office number left for any further questions, patient may have.

## 2024-09-27 NOTE — PROGRESS NOTES
Facial Plastic & Reconstructive Surgery      POV s/p 9/24/24 Right Lower Eyelid Repair and fat transfer from left thigh and abdomen with liposuction and injection cannulas     Doing well, endorses improved pain and swelling  Continues ointment to surgical sites    Good eyelid position and contour  Incisions c/d/I    Plan:  Continue nasal saline sprays and ointment to nares  RTC 4-6 months or sooner if needed     Jovanni Jiang PA-C

## 2024-09-30 ENCOUNTER — APPOINTMENT (OUTPATIENT)
Dept: OTOLARYNGOLOGY | Facility: CLINIC | Age: 54
End: 2024-09-30
Payer: COMMERCIAL

## 2024-10-02 NOTE — PROGRESS NOTES
Facial Plastic & Reconstructive Surgery      POV s/p 9/24/24 Right Lower Eyelid Repair and fat transfer from left thigh and abdomen with liposuction and injection cannulas     Doing well, endorses improved pain and swelling  Continues ointment to surgical sites  Right eyelid bolster splints removed without issue    Good eyelid position and contour  Incisions c/d/I at left upper thigh and abdominal fat grafting sites   Mild swelling and erythema at right lower lateral eyelid                      Plan:  Continue mupirocin to skin surgical sites TID  Continue ophthalmic drops and ointment as prescribed  Start Doxy for two weeks  Recommend cold packs multiple times daily to assist with right eyelid swelling and pain  Recommend Tylenol q4 for inflammation  RTC in two weeks for wound check on right eyelid or sooner if needed     Jovanni Jiang PA-C

## 2024-10-04 ENCOUNTER — OFFICE VISIT (OUTPATIENT)
Dept: OTOLARYNGOLOGY | Facility: CLINIC | Age: 54
End: 2024-10-04
Payer: COMMERCIAL

## 2024-10-04 DIAGNOSIS — G89.18 POSTOPERATIVE PAIN: ICD-10-CM

## 2024-10-04 DIAGNOSIS — H02.202 LAGOPHTHALMOS OF RIGHT LOWER EYELID, UNSPECIFIED LAGOPHTHALMOS TYPE: ICD-10-CM

## 2024-10-04 DIAGNOSIS — H02.533 LID RETRACTION, RIGHT: ICD-10-CM

## 2024-10-04 DIAGNOSIS — H02.533 LID RETRACTION OF RIGHT EYE: Primary | ICD-10-CM

## 2024-10-04 PROCEDURE — 99024 POSTOP FOLLOW-UP VISIT: CPT | Performed by: PHYSICIAN ASSISTANT

## 2024-10-04 PROCEDURE — RXMED WILLOW AMBULATORY MEDICATION CHARGE

## 2024-10-04 RX ORDER — DOXYCYCLINE 100 MG/1
100 CAPSULE ORAL 2 TIMES DAILY
Qty: 20 CAPSULE | Refills: 0 | Status: SHIPPED | OUTPATIENT
Start: 2024-10-04 | End: 2024-10-14

## 2024-10-04 RX ORDER — MOXIFLOXACIN 5 MG/ML
1 SOLUTION/ DROPS OPHTHALMIC EVERY 12 HOURS
Qty: 3 ML | Refills: 0 | Status: SHIPPED | OUTPATIENT
Start: 2024-10-04 | End: 2024-10-11

## 2024-10-04 RX ORDER — ERYTHROMYCIN 5 MG/G
OINTMENT OPHTHALMIC NIGHTLY
Qty: 3.5 G | Refills: 3 | Status: SHIPPED | OUTPATIENT
Start: 2024-10-04 | End: 2024-11-03

## 2024-10-08 ENCOUNTER — PHARMACY VISIT (OUTPATIENT)
Dept: PHARMACY | Facility: CLINIC | Age: 54
End: 2024-10-08
Payer: MEDICAID

## 2024-10-16 ENCOUNTER — APPOINTMENT (OUTPATIENT)
Dept: OTOLARYNGOLOGY | Facility: CLINIC | Age: 54
End: 2024-10-16
Payer: COMMERCIAL

## 2024-10-25 ENCOUNTER — TELEPHONE (OUTPATIENT)
Dept: OTOLARYNGOLOGY | Facility: HOSPITAL | Age: 54
End: 2024-10-25
Payer: COMMERCIAL

## 2024-10-25 NOTE — TELEPHONE ENCOUNTER
Called to reschedule appointment with Dr. Delgado on 11/01/2024 but phone not working sent patient a my chart message with new date

## 2024-11-01 ENCOUNTER — APPOINTMENT (OUTPATIENT)
Dept: OTOLARYNGOLOGY | Facility: HOSPITAL | Age: 54
End: 2024-11-01
Payer: COMMERCIAL

## 2024-11-07 PROCEDURE — RXMED WILLOW AMBULATORY MEDICATION CHARGE

## 2024-11-08 ENCOUNTER — PHARMACY VISIT (OUTPATIENT)
Dept: PHARMACY | Facility: CLINIC | Age: 54
End: 2024-11-08
Payer: MEDICAID

## 2024-11-21 ENCOUNTER — APPOINTMENT (OUTPATIENT)
Dept: OTOLARYNGOLOGY | Facility: HOSPITAL | Age: 54
End: 2024-11-21
Payer: COMMERCIAL

## 2024-12-04 ENCOUNTER — APPOINTMENT (OUTPATIENT)
Dept: HEMATOLOGY/ONCOLOGY | Facility: HOSPITAL | Age: 54
End: 2024-12-04
Payer: COMMERCIAL

## 2024-12-11 ASSESSMENT — ENCOUNTER SYMPTOMS
NAUSEA: 0
FEVER: 0
TROUBLE SWALLOWING: 1
HEADACHES: 0
DIARRHEA: 0
LIGHT-HEADEDNESS: 0
SHORTNESS OF BREATH: 0
LEG SWELLING: 0
SORE THROAT: 0
CONSTIPATION: 0
VOMITING: 0
NUMBNESS: 1
CHILLS: 0
COUGH: 0
ABDOMINAL PAIN: 0
ARTHRALGIAS: 1

## 2024-12-11 NOTE — PROGRESS NOTES
Patient ID: Pamella Rose is a 54 y.o. female.  Diagnosis: Right maxillary sinus  squamous cell carcinoma  Staging: [id number]  Date of Diagnosis:    Providers:   ENT: Dr. Jay Delgado  MedOn: Dr. Kyunghee Burkitt / BABITA Hollins  RadOnc: Dr. Nadege Horton      Surgery:  2/25/22: Right maxillary biopsy showed poorly differentiated Squamous Cell Carcinoma with focal keratinization involving squmous mucosa.   3/20/22: Right Neck Exploration,  right scapular tip  free flap; maxilla reconstruction with placement of hardware   3/22/23: Right Mandibular Debridement w/ Dr. Delgado.  2/22/24: Debridement of mandible    Prior Therapies:  5/11 - 6/22/22: Completed concurrent chemoXRT w/ 7 weeks of Carboplatin (2mg/AUC/dose) + Paclitaxel (45mg/m2) and 66gy VMAT in 30 fx.     Oncologic Issues  Osteoradionecrosis  Dysphagia  Mastication difficulties     ONCOLOGIC HISTORY  - 2/4/22: Pt initially presented to Dr. Delgado with swelling of the upper gum area that didn’t improve with abx. Referred by who Dentist did an in-office xray and was concerned about a mass in the sinus  - 2/4/22 FNA showed some atypical epithelioid cells, but no definitive diagnosis  - 2/18/22: CT neck with contrast showed a large destructive mass centered in the right maxillary sinus with marked osseous destruction and extension beyond the sinus walls into the nasal cavity, hard palate, and periantral soft tissues. There is also  likely subtle extension into the inferior right orbit and possibly involvement of the infraorbital canal concerning for perineural extension of neoplasm. There are several enlarged and heterogeneous lymph nodes bilaterally compatible with metastasis.  - 2/25/22 Right maxillary biopsy showed poorly differentiated Squamous Cell Carcinoma with focal keratinization involving squmous mucosa.   - 3/14/22: PET/CT showed subcentimeter RUL without significant metabolic activity, needs to be monitored with CT chest with contrast.  - 3/16/22  Surgery: Right hemimaxillectomy without orbital exenteration, bilateral  NE (Right leverl 1-4, Left level 1-3); DL,  bronchoscopy, e sophagoscopy; Rigid Nasal Endoscopy with biopsy; Open tracheostomy with Dr. Delgado. Left scapular tip and latissimus muscle free flap , maxillary reconstruction with placement of hardware, Orbital floor reconstruction  with placement of hardware with Dr. Al.  - 3/16/22 Pathology revealed 4 LNs (with metastatic adenosquamous carcinoma) and 1 with thyroid carcinoma (subcapsular micrometastasis), level Right 1B, bilateral level 2/3 lymph nodes, with PAOLA.  Patient will need US thyroid in future and potential total thyroidectomy.  - 3/22/22 Surgery:  Right Neck Exploration, r ight scapular tip free flap; m axilla reconstruction with placement of hardware with Dr. Al   - 3/25/22 Tumor Board: Recently found to have right maxilla mass underwent surgical resection, findings c/w adeno-squamous carcinoma, also found to have  metastatic thyroid carcinoma in left level 2/3 lymph node. Rec: Adjuvant chemoradiation; total thyroidectomy  - 5/11/ - 6/22: Completed concurrent chemoXRT w/ 7 weeks of Carboplatin (2mg/AUC/dose) + Paclitaxel (45mg/m2) and 66gy VMAT in 30 fx.   - 3/22/23: Right Mandibular Debridement w/ Dr. Delgado.  - 7/10/23 FNA of a left cervical LN. Path showed polymorphous lymphoid tissue with no carcinoma identified.       Admission:  - 6/23 - 6/28/23: Admitted for Acute Otitis externa and febrile neutropenia. Received broad spectrum IV antibiotics. 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      Past Medical History:   Past Medical History:  No date: Anxiety  No date: CKD (chronic kidney disease)      Comment:  Stage 3  No date: Depression  No date: Dysphagia  09/25/2023: Encounter for electrocardiogram      Comment:  Normal sinus rhythm Normal ECG  No date: GERD (gastroesophageal reflux disease)  No date:  History of sinus cancer      Comment:  Right maxillary sinus  squamous cell carcinoma s/p                concurrent chemoXRT  : Hx of tracheostomy  No date: Hyperaldosteronism (Multi)  No date: Hyperlipidemia  No date: Hypertension  No date: Hypothyroidism  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  2. Normal global left ventricular systolic function.  3. Moderate to severe LVH and hypertensive response to exercise without any regional wall motion abnormalities at 61% MPHR.     SECTION, LOW TRANSVERSE      COLONOSCOPY      CT ANGIO AORTA AND BILATERAL ILIOFEMORAL RUNOFF W AND OR WO IV CONTRAST  2023    CT AORTA AND BILATERAL ILIOFEMORAL RUNOFF ANGIOGRAM W AND/OR WO IV CONTRAST 2023 [facility] CT    ECHOCARDIOGRAM 2 D M MODE PANEL  2016    The left ventricular systolic function is normal with a 60-65% ejection fraction.  Spectral Doppler shows an impaired relaxation pattern of left ventricular diastolic filling. There is moderate to severe concentric left ventricular hypertrophy.    IR INTERVENTION VENOUS SAMPLING  2021    IR INTERVENTION VENOUS SAMPLING 2021 [facility]    IR INTERVENTION VENOUS SAMPLING  2021    IR INTERVENTION VENOUS SAMPLING 2021 [facility]    OTHER SURGICAL HISTORY      right maxillectomy scapula free flap    OTHER SURGICAL HISTORY      right mandibular debridement      Family History:    Family History   Problem Relation Name Age of Onset    Hypertension Mother      Other (CABG) Mother      Hypertension Father      Lung cancer Father      Colon  cancer Other grandfather      Family Oncology History:    Cancer-related family history includes Colon cancer in an other family member; Lung cancer in her father.  Social History:    Social History     Tobacco Use    Smoking status: Every Day     Current packs/day: 0.25     Average packs/day: 0.2 packs/day for 39.9 years (10.0 ttl pk-yrs)     Types: Cigarettes     Start date: 1/1/1985     Passive exposure: Never    Smokeless tobacco: Never   Vaping Use    Vaping status: Never Used   Substance Use Topics    Alcohol use: Yes     Comment: OCCASIONALLY    Drug use: Yes     Types: Marijuana     Comment: ADMITS TO GUMMIES        Chief Complaint: Squamous Cell Carcinoma of Maxillary Sinus    HPI  Pamella Rose is a 54 y.o. female with h/o tight maxillary sinus squamous cell carcinoma. [id number].  S/p right hemimaxillectomy, left scapula flap with revision and right scapula tip free flap, bilateral ND. Completed concurrent chemoXRT w/ 7 cycles of Carbo/Taxol and 66gy VMAT on 6/22/22.     Interval History  Patient presents for 2 year 6 months follow up and reports the following:  ***  Patient reports she's feeling around her baseline.   Doing swallowing exercises but still has dysphagia,  report food sometimes gets stuck.  Dr. Delgado talked about possible esophageal dilation down the road.   Recall she was scheduled to have surgery on 8/5 but her TSH is severely elevated so surgery was aborted. She has been compliant with Levothyroxine 137mcg QAM.   She c/o tightness in the jaw and right jaw pain. Unable to open mouth very much and jaw fatigues with chewing.   Energy improved since starting levothyroxine consistently  She also needs refill of HTN meds and Omeprazole.     ROS  Review of Systems   Constitutional:  Negative for chills and fever.   HENT:   Positive for hearing loss and trouble swallowing. Negative for lump/mass, mouth sores, nosebleeds and sore throat.    Respiratory:  Negative for cough and shortness of breath.     Cardiovascular:  Negative for chest pain and leg swelling.   Gastrointestinal:  Negative for abdominal pain, constipation, diarrhea, nausea and vomiting.   Musculoskeletal:  Positive for arthralgias (shoulder pain).   Skin:  Negative for rash.   Neurological:  Positive for numbness. Negative for headaches and light-headedness.       Allergies  No Known Allergies     Medications  Current Outpatient Medications   Medication Instructions    amLODIPine (NORVASC) 10 mg, g-tube, Daily    chlorhexidine (Peridex) 0.12 % solution Take 15 mL by mouth 2 (two) times daily    FLUoxetine (PROZAC) 20 mg, g-tube, Daily    levothyroxine (SYNTHROID, LEVOXYL) 137 mcg, g-tube, Daily before breakfast    lisinopril 40 mg, g-tube, Daily    lubricating eye drops ophthalmic solution 1 drop, Right Eye, 4 times daily    metoprolol succinate XL (TOPROL-XL) 50 mg, oral, Daily, Do not crush or chew. HOLD UNTIL CLEARED FOR ORAL DIET    metoprolol tartrate (LOPRESSOR) 25 mg, oral, Daily    omeprazole (PRILOSEC) 40 mg, oral, Daily    rosuvastatin (CRESTOR) 10 mg, g-tube, Daily    spironolactone (ALDACTONE) 25 mg, g-tube, Once, 1 tablet DAILY (route: oral)        OBJECTIVE:  VS:  LMP  (LMP Unknown)   Daily Weight  09/24/24 : 79.3 kg (174 lb 13.2 oz)  09/09/24 : 77.6 kg (171 lb 1.2 oz)  08/28/24 : 77.6 kg (171 lb)  08/06/24 : 79.1 kg (174 lb 6.1 oz)  05/03/24 : 79.8 kg (176 lb)  04/16/24 : 79.4 kg (175 lb)  03/15/24 : 79.4 kg (175 lb)      Physical Exam  Constitutional:       Appearance: Normal appearance. She is well-developed.   HENT:      Head: Normocephalic and atraumatic.      Comments: + facial swelling bilaterally cheek, R>L  Neck stiffness R>L  Post treatment changes in right side of face     Right Ear: External ear normal. No tenderness.      Left Ear: External ear normal. No tenderness.      Nose: Nose normal.      Mouth/Throat:      Mouth: Mucous membranes are moist. No injury or oral lesions.      Tongue: No lesions.      Pharynx:  Oropharynx is clear.      Comments: Edentulous, trismus with limited oral opening  Eyes:      Extraocular Movements: Extraocular movements intact.      Conjunctiva/sclera: Conjunctivae normal.      Pupils: Pupils are equal, round, and reactive to light.      Comments: Ectropion in right eye   Neck:      Thyroid: No thyroid mass.   Cardiovascular:      Rate and Rhythm: Normal rate and regular rhythm.   Pulmonary:      Effort: Pulmonary effort is normal. No respiratory distress.      Breath sounds: Normal breath sounds.   Abdominal:      General: Bowel sounds are normal. There is no distension or abdominal bruit.      Palpations: Abdomen is soft. There is no mass.      Tenderness: There is no abdominal tenderness.   Musculoskeletal:         General: Normal range of motion.      Cervical back: Normal range of motion and neck supple. No signs of trauma. Normal range of motion.      Right lower leg: No edema.      Left lower leg: No edema.   Lymphadenopathy:      Cervical: No cervical adenopathy.      Upper Body:      Right upper body: No axillary adenopathy.      Left upper body: No axillary adenopathy.   Skin:     General: Skin is warm and dry.      Findings: No lesion or rash.   Neurological:      General: No focal deficit present.      Mental Status: She is alert and oriented to person, place, and time.      Gait: Gait is intact.   Psychiatric:         Mood and Affect: Mood and affect normal.         Behavior: Behavior is cooperative.         Diagnostic Results     Labs                                            Images      Assessment/Plan     ASSESSMENT  Pamella Rose is a 54 y.o. female with h/o tight maxillary sinus squamous cell carcinoma. [id number].  S/p right hemimaxillectomy, left scapula flap with revision and right scapula tip free flap, bilateral ND. From 5/11/ - 6/22/22 she completed concurrent chemoXRT w/ 7 weeks of Carboplatin (2mg/AUC/dose) + Paclitaxel (45mg/m2) and 66gy VMAT  in 30 fx.      # Right  maxillary sinus squamous cell carcinoma  - 3/16/22 Right hemimaxillectomy, left scapula flap with revision and right scapula tip free flap, b/l ND with negative margins.   - Since surgery she has pain in right side of face, tingling and swelling in right cheek, swelling has improved this week. Following Supp Onc (Sarah NETTLES) for pain management.  - 6/22/22 Completed concurrent chemoXRT w/ 7 weeks of Carboplatin (2mg/AUC/dose) + Paclitaxel (45mg/m2) and 66gy VMAT in 30 fx  - Unfortunately was hospitalized 6/26 -6/28 for febrile neutropenia and otitis externa, d/c home on oral abx, and eye and ear drops.   - 11/18/22: post treatment PET/CT showed good response, increased metabolic activity along the right maxillary bone graft, likely reactive. Patient was seen by Dr. Delgado in [place], per  his exam, she has MAYELA.  - 3/16/23: ongoing lymphedema of right face/mandible and lateral nose with neuropathy and pressure pain.  Pain is managed by supp onc. She is scheduled to have procedure next week to  work on exposed bone around mandible.  - 3/22/23: s/p right mandibular debridement  - 4/26/23 MRI Orbits showed post op changes w/ interval decrease in the enhancement in the surgical bed and surround soft tissues.   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  - 7/10/23 FNA of left parotic nodule showed polymorphous lymphoid tissue w/ no carcinoma identified.   - 7/20/23: Patient is stable overall, has chronic SEs from chemoradiation including chronic facial and neck swelling/lymphedema, right shoulder contracture with decreases ROM, weakness and right arm neuropathy. Has chronic fatigue. Patient presented me with  Medical Source Statement to complete from her .   - 10/5/23: Patient with about 1.5 cm open wound under right mandible at junction with neck. Has been draining for 2 weeks, drainage is mucoid mixed with blood. No fevers/chills, no erythema. Unfortunately  "no labs today, however does not appear to be infected. She does have wound care appts set up but unclear if patient is going to these appts consistently as she cites issues with transportation.   - 2/9/24: Patient admitted for osteoradionecrosis a few months ago. She did not continue HPO therapy.   - 4/16/24: Patient had mandibular debridement and reconstruction with Dr. Delgado on 2/22. She has swelling, pain and neuropathy in the right mandible. She does have muscle tightness in the right neck with spasms. She has stopped smoking a couple months ago.  - 8/28/24: Patient feeling at her baseline. WA snot able to have ectropion repair on 8/5 due to uncontrolled hypothyroidism. Still has pain/stiffness in right face. Trouble swallowing. Chronic SE are stable.     # GERD  - Acid reflux, controlling with Omeprazole 20mg. Still having symptoms on this dose. Will increase to 40mg    # Trismus  - Pt with trismus and jaw fatigue, worse since surgery in February.     # Dysphagia  - Ongoing dysphagia, though pt feels this may be worse as she feel food gets stuck more frequently. Currently eating a regular diet. Will follow up w/ Dr Delgado regarding possible esophageal dilation.     # Thyroid carcinoma   - 3/16/22 Pathology showing 1 left level 2/3 LN with thyroid carcinoma (subcapsular micrometastasis)  - 6/24/22 CT Neck showed Similar appearance of the thyroid gland with sub-centimeter hypodensities in the right greater than left lobe.  - 11/18/22 restaging scan did not find anything remarkable in the thyroid  - Dr. Delgado is following this, per his 6/16 FUV \"since this was an incidental micromets, and there are no significant nodules after chemoradiation, would just observe with serial ultrasounds at this time; pt has significantly scarred neck and likely no residual  disease in the thyroid\"    # Hypothroidism  - 8/11/22 TSH 3.74  - 12/1/22: TSH 17.20  - 2/17/23: started synthroid 50mcg on 2/17/23 based on 12/1/22 TSH level.  - 4/13/23 " TSH 14.91  - 6/22/23 TSH 29.7  - 7/20/23 TSH 32.9. Levothyroxine increased to 88mcg. Recheck TSH/T4 on 8/25  - 10/5/23: Patient reports she has not been taking levothyroxine. Patient did not go to lab today to have TSH redrawn. Levothyroxine 100mg was sent to [facility] pharmacy for patient to  today.   - 2/8/24: TSH 15.32, T4 1.01. Patient to increase Levothyroxine to 112mcg.   - 4/16/24: TSH 36.12, T4 1.01. Will increase to 137mcg.   - 8/28/24: TSH 1.85. Will continue levothyroxine 137mcg    # Right eye ptosis/epiphora/possible conjunctivitis  - Right eye ptosis and epiphora, likely related to prior surgery/radiation. No vision change/eye pain  - redness/itchiness in right eye for a while. continue treat her for potential conjunctivitis with Floxin otic drop  - Will follow up with Dr. Best regarding right ectropion repair     # Facial lymphedema  - Right side of face and neck. Swelling fluctuates. Start twice a week lymphedema therapy on 10/31/22. Has had good improvement on the swelling though still has lymphedema at baseline.  - Advised that she continue to do daily lymphedema exercises      # Right ear hearing loss  - Patient c/o subjective hearing loss in right ear, ear still feels full, sounds muffled 3 months after treatment  - Was referred to Audiology and had audiogram scheduled 11/11/22, however patient missed this appointment.   - 10/5/23: Patient again endorsed worsening hearing. Referral made again for Audiology and patient is agreeable.       # CKD, stage 3  - Following with Dr. Ana Lee  - Today Cr 1.21, GFR 53, around her baseline. Encourage patient to keep up with hydration via PEG and PO.     # Depression  - Following Sushil Bryan ([facility]), was started on Duloxetine 20mg and Olanzapine 2.5mg     # Hypertension  - 148/97, HR 73. Patient has PCP at [facility], reports she's on Lisinopril, Amlodipine, Spironolactone, Metoprolol, and is on a statin. She reports missing some medications  here and there. Advised patient to taking her BP meds daily.   - BP wnl in clinic today  - 8/28: Patient requested refill of Lisinopril 40mg, Metoprolol 25mg every day for her HTN. She will follow up with her PCP at [facility] for future refills and management.     PLAN:  -- RTC 3 months on 12/4/24 w/ MedOnc, labs cbc, cmp, tsh/t4  -- 11/11 FUV w/ Dr. Delgado  -- Continue Levothyroxine 137mcg.   -- Increase Omeprazole to 40mg QD for GERD  -- Lisinopril 40mg QD, Metop 25mg every day, 30 day supply sent to [facility]. Pt to follow up with PCP for hypertension management.  -- Patient to reach out to Dr. Best regarding ectropion repair.

## 2024-12-12 ENCOUNTER — APPOINTMENT (OUTPATIENT)
Dept: HEMATOLOGY/ONCOLOGY | Facility: HOSPITAL | Age: 54
End: 2024-12-12
Payer: COMMERCIAL

## 2024-12-12 DIAGNOSIS — E03.9 HYPOTHYROIDISM, UNSPECIFIED TYPE: ICD-10-CM

## 2024-12-12 DIAGNOSIS — C41.1 CANCER OF INFERIOR MAXILLA (MULTI): Primary | ICD-10-CM

## 2024-12-20 ENCOUNTER — HOSPITAL ENCOUNTER (OUTPATIENT)
Dept: GASTROENTEROLOGY | Facility: HOSPITAL | Age: 54
Discharge: HOME | End: 2024-12-20
Payer: COMMERCIAL

## 2024-12-20 VITALS
BODY MASS INDEX: 32.1 KG/M2 | OXYGEN SATURATION: 97 % | SYSTOLIC BLOOD PRESSURE: 140 MMHG | RESPIRATION RATE: 16 BRPM | DIASTOLIC BLOOD PRESSURE: 90 MMHG | WEIGHT: 170 LBS | HEART RATE: 69 BPM | TEMPERATURE: 98.1 F | HEIGHT: 61 IN

## 2024-12-20 DIAGNOSIS — Z86.0100 HISTORY OF COLON POLYPS: ICD-10-CM

## 2024-12-20 PROCEDURE — 45330 DIAGNOSTIC SIGMOIDOSCOPY: CPT | Performed by: INTERNAL MEDICINE

## 2024-12-20 PROCEDURE — 7100000009 HC PHASE TWO TIME - INITIAL BASE CHARGE

## 2024-12-20 PROCEDURE — 2500000004 HC RX 250 GENERAL PHARMACY W/ HCPCS (ALT 636 FOR OP/ED): Mod: SE | Performed by: INTERNAL MEDICINE

## 2024-12-20 PROCEDURE — 7100000010 HC PHASE TWO TIME - EACH INCREMENTAL 1 MINUTE

## 2024-12-20 RX ORDER — FENTANYL CITRATE 50 UG/ML
INJECTION, SOLUTION INTRAMUSCULAR; INTRAVENOUS AS NEEDED
Status: COMPLETED | OUTPATIENT
Start: 2024-12-20 | End: 2024-12-20

## 2024-12-20 RX ORDER — MIDAZOLAM HYDROCHLORIDE 1 MG/ML
INJECTION INTRAMUSCULAR; INTRAVENOUS AS NEEDED
Status: COMPLETED | OUTPATIENT
Start: 2024-12-20 | End: 2024-12-20

## 2024-12-20 ASSESSMENT — PAIN - FUNCTIONAL ASSESSMENT
PAIN_FUNCTIONAL_ASSESSMENT: 0-10

## 2024-12-20 ASSESSMENT — PAIN SCALES - GENERAL
PAINLEVEL_OUTOF10: 0 - NO PAIN

## 2024-12-20 NOTE — DISCHARGE INSTRUCTIONS

## 2024-12-20 NOTE — H&P
"History Of Present Illness  Pamella Rose is a 54 y.o. female presenting for open-access colonoscopy for \"history of colon polyps in 2018\". However, the polyps were not adenomatous. The prior colonoscopy 18 revealed pan-diverticulosis of the colon, a 3 mm descending colon \"polyp\" (path = normal) and four 3 - 5 mm rectal hyperplastic polyps. A repeat surveillance colonoscopy was recommended in 5 years, but the polyps were not adenomatous. The patient states she is still having soft stools despite taking the prep. She insists on proceeding with the colonoscopy.     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  2. Normal global left ventricular systolic function.  3. Moderate to severe LVH and hypertensive response to exercise without any regional wall motion abnormalities at 61% MPHR.     SECTION, LOW TRANSVERSE      COLONOSCOPY      CT ANGIO AORTA AND BILATERAL ILIOFEMORAL RUNOFF W AND OR WO IV CONTRAST  2023    CT AORTA AND BILATERAL ILIOFEMORAL RUNOFF ANGIOGRAM W AND/OR WO IV CONTRAST 2023 [facility] CT    ECHOCARDIOGRAM 2 D M MODE PANEL  2016    The left ventricular " systolic function is normal with a 60-65% ejection fraction.  Spectral Doppler shows an impaired relaxation pattern of left ventricular diastolic filling. There is moderate to severe concentric left ventricular hypertrophy.    IR INTERVENTION VENOUS SAMPLING  08/11/2021    IR INTERVENTION VENOUS SAMPLING 8/11/2021 [facility]    IR INTERVENTION VENOUS SAMPLING  09/14/2021    IR INTERVENTION VENOUS SAMPLING 9/14/2021 [facility]    OTHER SURGICAL HISTORY      right maxillectomy scapula free flap    OTHER SURGICAL HISTORY      right mandibular debridement     Social History  She reports that she has been smoking cigarettes. She started smoking about 39 years ago. She has a 10 pack-year smoking history. She has never been exposed to tobacco smoke. She has never used smokeless tobacco. She reports current alcohol use. She reports current drug use. Drug: Marijuana.    Family History  Family History   Problem Relation Name Age of Onset    Hypertension Mother      Other (CABG) Mother      Hypertension Father      Lung cancer Father      Colon cancer Other grandfather         Allergies  No Known Allergies    Pre-sedation Evaluation:  ASA Classification - ASA 3 - Patient with moderate systemic disease with functional limitations  Mallampati Score - III (soft and hard palate and base of uvula visible)    Physical Exam  Constitutional:       Appearance: She is obese.   HENT:      Mouth/Throat:      Mouth: Mucous membranes are moist.   Eyes:      Conjunctiva/sclera: Conjunctivae normal.   Cardiovascular:      Rate and Rhythm: Normal rate.   Pulmonary:      Effort: Pulmonary effort is normal.   Abdominal:      Palpations: Abdomen is soft.   Skin:     General: Skin is warm.   Neurological:      Mental Status: She is oriented to person, place, and time.   Psychiatric:         Mood and Affect: Mood normal.          Last Recorded Vitals  There were no vitals taken for this visit.     Assessment/Plan   open-access colonoscopy  "for \"history of colon polyps in 2018\". However, the polyps were not adenomatous. The prior colonoscopy 1/5/18 revealed pan-diverticulosis of the colon, a 3 mm descending colon \"polyp\" (path = normal) and four 3 - 5 mm rectal hyperplastic polyps. A repeat surveillance colonoscopy was recommended in 5 years, but the polyps were not adenomatous. The patient states she is still having soft stools despite taking the prep. She insists on proceeding with the colonoscopy.     PTA/Current Medications:  (Not in a hospital admission)    Current Outpatient Medications   Medication Sig Dispense Refill    amLODIPine (Norvasc) 10 mg tablet 1 tablet (10 mg) by g-tube route once daily.      chlorhexidine (Peridex) 0.12 % solution Take 15 mL by mouth 2 (two) times daily 1419 mL 5    FLUoxetine (PROzac) 20 mg tablet 1 tablet (20 mg) by g-tube route once daily.      levothyroxine (Synthroid, Levoxyl) 137 mcg tablet 1 tablet (137 mcg) by g-tube route once daily in the morning. Take before meals. 30 tablet 11    lisinopril 40 mg tablet 1 tablet (40 mg) by g-tube route once daily. 30 tablet 0    lubricating eye drops ophthalmic solution Administer 1 drop into the right eye 4 times a day. 60 each 1    metoprolol succinate XL (Toprol-XL) 50 mg 24 hr tablet Take 1 tablet (50 mg) by mouth once daily. Do not crush or chew. HOLD UNTIL CLEARED FOR ORAL DIET      metoprolol tartrate (Lopressor) 25 mg tablet Take 1 tablet (25 mg) by mouth once daily. 30 tablet 0    omeprazole (PriLOSEC) 40 mg DR capsule Take 1 capsule (40 mg) by mouth once daily. 90 capsule 2    rosuvastatin (Crestor) 10 mg tablet 1 tablet (10 mg) by g-tube route once daily.      spironolactone (Aldactone) 25 mg tablet 1 tablet (25 mg) by g-tube route 1 time for 1 dose. 1 tablet DAILY (route: oral)       No current facility-administered medications for this encounter.     Jorge Luis Pate MD  "

## 2024-12-26 PROCEDURE — RXMED WILLOW AMBULATORY MEDICATION CHARGE

## 2024-12-30 ENCOUNTER — PHARMACY VISIT (OUTPATIENT)
Dept: PHARMACY | Facility: CLINIC | Age: 54
End: 2024-12-30
Payer: MEDICAID

## 2025-01-09 ENCOUNTER — TELEPHONE (OUTPATIENT)
Dept: OTOLARYNGOLOGY | Facility: HOSPITAL | Age: 55
End: 2025-01-09
Payer: COMMERCIAL

## 2025-01-09 NOTE — TELEPHONE ENCOUNTER
Patient called to let me know that her peg tube broke off and wanted to see Dr Delgado sooner than 1/16. Explained that Dr Delgado does not replace or insert peg tubes. Pt says it was placed back in 2022 and she was unsure who even placed it. Pt states she still uses it due to sore gums and is planning to go to the ER to have it replaced. Still scheduled to see Dr Delgado on 1/16 and confirmed that she will be at that appt.

## 2025-01-11 ENCOUNTER — HOSPITAL ENCOUNTER (EMERGENCY)
Facility: HOSPITAL | Age: 55
Discharge: HOME | End: 2025-01-11
Attending: EMERGENCY MEDICINE
Payer: COMMERCIAL

## 2025-01-11 VITALS
HEIGHT: 64 IN | BODY MASS INDEX: 29.88 KG/M2 | TEMPERATURE: 98.6 F | WEIGHT: 175 LBS | OXYGEN SATURATION: 98 % | DIASTOLIC BLOOD PRESSURE: 91 MMHG | RESPIRATION RATE: 16 BRPM | HEART RATE: 69 BPM | SYSTOLIC BLOOD PRESSURE: 159 MMHG

## 2025-01-11 DIAGNOSIS — Z43.1 PEG (PERCUTANEOUS ENDOSCOPIC GASTROSTOMY) ADJUSTMENT/REPLACEMENT/REMOVAL (MULTI): Primary | ICD-10-CM

## 2025-01-11 LAB
ALBUMIN SERPL BCP-MCNC: 4.1 G/DL (ref 3.4–5)
ALP SERPL-CCNC: 60 U/L (ref 33–110)
ALT SERPL W P-5'-P-CCNC: 8 U/L (ref 7–45)
ANION GAP SERPL CALC-SCNC: 13 MMOL/L (ref 10–20)
AST SERPL W P-5'-P-CCNC: 10 U/L (ref 9–39)
BASOPHILS # BLD AUTO: 0.03 X10*3/UL (ref 0–0.1)
BASOPHILS NFR BLD AUTO: 0.5 %
BILIRUB SERPL-MCNC: 0.3 MG/DL (ref 0–1.2)
BUN SERPL-MCNC: 23 MG/DL (ref 6–23)
CALCIUM SERPL-MCNC: 9.9 MG/DL (ref 8.6–10.6)
CHLORIDE SERPL-SCNC: 110 MMOL/L (ref 98–107)
CO2 SERPL-SCNC: 26 MMOL/L (ref 21–32)
CREAT SERPL-MCNC: 1.38 MG/DL (ref 0.5–1.05)
EGFRCR SERPLBLD CKD-EPI 2021: 45 ML/MIN/1.73M*2
EOSINOPHIL # BLD AUTO: 0.23 X10*3/UL (ref 0–0.7)
EOSINOPHIL NFR BLD AUTO: 4.2 %
ERYTHROCYTE [DISTWIDTH] IN BLOOD BY AUTOMATED COUNT: 15.5 % (ref 11.5–14.5)
GLUCOSE SERPL-MCNC: 116 MG/DL (ref 74–99)
HCT VFR BLD AUTO: 37.5 % (ref 36–46)
HGB BLD-MCNC: 12.3 G/DL (ref 12–16)
HOLD SPECIMEN: NORMAL
IMM GRANULOCYTES # BLD AUTO: 0.01 X10*3/UL (ref 0–0.7)
IMM GRANULOCYTES NFR BLD AUTO: 0.2 % (ref 0–0.9)
LYMPHOCYTES # BLD AUTO: 2.26 X10*3/UL (ref 1.2–4.8)
LYMPHOCYTES NFR BLD AUTO: 41.1 %
MAGNESIUM SERPL-MCNC: 2.02 MG/DL (ref 1.6–2.4)
MCH RBC QN AUTO: 28.7 PG (ref 26–34)
MCHC RBC AUTO-ENTMCNC: 32.8 G/DL (ref 32–36)
MCV RBC AUTO: 87 FL (ref 80–100)
MONOCYTES # BLD AUTO: 0.42 X10*3/UL (ref 0.1–1)
MONOCYTES NFR BLD AUTO: 7.6 %
NEUTROPHILS # BLD AUTO: 2.55 X10*3/UL (ref 1.2–7.7)
NEUTROPHILS NFR BLD AUTO: 46.4 %
NRBC BLD-RTO: 0 /100 WBCS (ref 0–0)
PLATELET # BLD AUTO: 176 X10*3/UL (ref 150–450)
POTASSIUM SERPL-SCNC: 3.8 MMOL/L (ref 3.5–5.3)
PROT SERPL-MCNC: 7.3 G/DL (ref 6.4–8.2)
RBC # BLD AUTO: 4.29 X10*6/UL (ref 4–5.2)
SODIUM SERPL-SCNC: 145 MMOL/L (ref 136–145)
WBC # BLD AUTO: 5.5 X10*3/UL (ref 4.4–11.3)

## 2025-01-11 PROCEDURE — 96360 HYDRATION IV INFUSION INIT: CPT

## 2025-01-11 PROCEDURE — 96361 HYDRATE IV INFUSION ADD-ON: CPT

## 2025-01-11 PROCEDURE — 99285 EMERGENCY DEPT VISIT HI MDM: CPT | Performed by: EMERGENCY MEDICINE

## 2025-01-11 PROCEDURE — 80053 COMPREHEN METABOLIC PANEL: CPT

## 2025-01-11 PROCEDURE — 83735 ASSAY OF MAGNESIUM: CPT

## 2025-01-11 PROCEDURE — 43762 RPLC GTUBE NO REVJ TRC: CPT | Performed by: STUDENT IN AN ORGANIZED HEALTH CARE EDUCATION/TRAINING PROGRAM

## 2025-01-11 PROCEDURE — 99204 OFFICE O/P NEW MOD 45 MIN: CPT | Performed by: STUDENT IN AN ORGANIZED HEALTH CARE EDUCATION/TRAINING PROGRAM

## 2025-01-11 PROCEDURE — 2500000004 HC RX 250 GENERAL PHARMACY W/ HCPCS (ALT 636 FOR OP/ED): Mod: SE

## 2025-01-11 PROCEDURE — 85025 COMPLETE CBC W/AUTO DIFF WBC: CPT

## 2025-01-11 PROCEDURE — 99284 EMERGENCY DEPT VISIT MOD MDM: CPT | Mod: 25 | Performed by: EMERGENCY MEDICINE

## 2025-01-11 PROCEDURE — 36415 COLL VENOUS BLD VENIPUNCTURE: CPT

## 2025-01-11 RX ADMIN — SODIUM CHLORIDE, SODIUM LACTATE, POTASSIUM CHLORIDE, AND CALCIUM CHLORIDE 1000 ML: 600; 310; 30; 20 INJECTION, SOLUTION INTRAVENOUS at 08:29

## 2025-01-11 ASSESSMENT — PAIN SCALES - GENERAL
PAINLEVEL_OUTOF10: 0 - NO PAIN

## 2025-01-11 ASSESSMENT — ENCOUNTER SYMPTOMS
RESPIRATORY NEGATIVE: 1
FEVER: 0
ABDOMINAL DISTENTION: 0
MUSCULOSKELETAL NEGATIVE: 1
PSYCHIATRIC NEGATIVE: 1
EYES NEGATIVE: 1
ABDOMINAL PAIN: 0
ENDOCRINE NEGATIVE: 1
FATIGUE: 0
CHILLS: 0
VOMITING: 0
CARDIOVASCULAR NEGATIVE: 1
NAUSEA: 0

## 2025-01-11 ASSESSMENT — LIFESTYLE VARIABLES
HAVE PEOPLE ANNOYED YOU BY CRITICIZING YOUR DRINKING: NO
HAVE YOU EVER FELT YOU SHOULD CUT DOWN ON YOUR DRINKING: NO
EVER FELT BAD OR GUILTY ABOUT YOUR DRINKING: NO
TOTAL SCORE: 0
EVER HAD A DRINK FIRST THING IN THE MORNING TO STEADY YOUR NERVES TO GET RID OF A HANGOVER: NO

## 2025-01-11 ASSESSMENT — PAIN - FUNCTIONAL ASSESSMENT: PAIN_FUNCTIONAL_ASSESSMENT: 0-10

## 2025-01-11 NOTE — PROGRESS NOTES
Pamella Rose is a 54 y.o. female history of CKD, GERD, history of tracheostomy, history of right maxillary squamous cell carcinoma/status post XRT/chemo, complication included osteoradionecrosis of the mandible, PEG tube dependent for majority of her nutrition.  She broke the access port off her PEG tube 3 days ago and has had decreased p.o. intake since then.  This patient was signed out to me and during the time of signout patient was pending labs and ACS recommendation.  Patient CMP was unremarkable except for elevated chloride with increasing creatinine at 1.38.  Patient GFR is decreased at 45.  This is patient normal baseline.  Patient CBC was unremarkable.  The ACS team came at bedside and fix patient PEG tube.  There is no intervention needed.  Patient was agreeable to the plan to be discharged.  Patient was then discharged.

## 2025-01-11 NOTE — CONSULTS
[facility]  ACUTE CARE SURGERY - HISTORY AND PHYSICAL / CONSULT    Patient Name: Pamella Rose  MRN: [id number]  Admit Date: 111  : 1970  AGE: 54 y.o.   GENDER: female  ==============================================================================  TODAY'S ASSESSMENT AND PLAN OF CARE:  Consult for replacement of PEG tube.     G tube replaced at bedside. Please see procedure note.   Patient shown how to use new g tube and given packaging to take home for her records.  Patient needs to have PEG exchanged (usually by PCP, oncologist, GI, or placing surgeon) every 6 months.  Tube confirmed in place through mature tract by aspiration of gastric content. No need for tube study.     [facility] [id number]    ==============================================================================  CHIEF COMPLAINT/REASON FOR CONSULT:  Pamella Rose is a 54 y.o. female history of CKD, GERD, history of tracheostomy, history of right maxillary squamous cell carcinoma/status post XRT/chemo, complication included osteoradionecrosis of the mandible, PEG tube dependent for majority of her nutrition.  She broke the access port off her PEG tube 3 days ago. Peg was placed at OSH 2 years ago and has not since been exchanged. She uses the PEG daily.   ROS: negative for fevers, chills, SOB, chest pain, abdominal pain, symptoms of infection around PEG tube site.    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  2. Normal global left ventricular systolic function.  3. Moderate to severe LVH and hypertensive response to exercise without any regional wall motion abnormalities at 61% MPHR.     SECTION, LOW TRANSVERSE      COLONOSCOPY      CT ANGIO AORTA AND BILATERAL ILIOFEMORAL RUN OFF INCLUDING WITHOUT CONTRAST IF PERFORMED  2023    CT AORTA AND BILATERAL ILIOFEMORAL RUNOFF ANGIOGRAM W AND/OR WO IV CONTRAST 2023 [facility] CT    ECHOCARDIOGRAM 2 D M MODE PANEL  2016    The left ventricular systolic function is normal with a 60-65% ejection fraction.  Spectral Doppler shows an impaired relaxation pattern of left ventricular diastolic filling. There is moderate to severe concentric left ventricular hypertrophy.    IR INTERVENTION VENOUS SAMPLING  2021    IR INTERVENTION VENOUS SAMPLING 2021 [facility]    IR INTERVENTION VENOUS SAMPLING  2021    IR INTERVENTION VENOUS SAMPLING 2021 [facility]    OTHER SURGICAL HISTORY      right maxillectomy scapula free flap    OTHER SURGICAL HISTORY      right mandibular debridement     FH:   Family History   Problem Relation Name Age of Onset    Hypertension Mother      Other (CABG) Mother      Hypertension Father      Lung cancer Father      Colon cancer Other grandfather      SOCIAL HISTORY:    Smoking:   Social History     Tobacco Use   Smoking Status Every Day    Current packs/day: 0.25    Average packs/day: 0.3 packs/day for 40.0 years (10.0 ttl pk-yrs)    Types: Cigarettes    Start date: 1985    Passive exposure: Never   Smokeless Tobacco Never       Alcohol:   Social History     Substance and Sexual Activity   Alcohol  Use Yes    Comment: OCCASIONALLY       MEDICATIONS:  Prior to Admission medications    Medication Sig Start Date End Date Taking? Authorizing Provider   amLODIPine (Norvasc) 10 mg tablet 1 tablet (10 mg) by g-tube route once daily. 2/22/24   Mark Alejandro MD   chlorhexidine (Peridex) 0.12 % solution Take 15 mL by mouth 2 (two) times daily 9/20/24      FLUoxetine (PROzac) 20 mg tablet 1 tablet (20 mg) by g-tube route once daily. 2/22/24   Mark Alejandro MD   levothyroxine (Synthroid, Levoxyl) 137 mcg tablet 1 tablet (137 mcg) by g-tube route once daily in the morning. Take before meals. 4/17/24   Rohit Boles PA-C   lisinopril 40 mg tablet 1 tablet (40 mg) by g-tube route once daily. 8/28/24 9/27/24  Rohit Boles PA-C   lubricating eye drops ophthalmic solution Administer 1 drop into the right eye 4 times a day.  Patient not taking: Reported on 12/20/2024 8/6/24   Jun Fountain MD   metoprolol succinate XL (Toprol-XL) 50 mg 24 hr tablet Take 1 tablet (50 mg) by mouth once daily. Do not crush or chew. HOLD UNTIL CLEARED FOR ORAL DIET 2/22/24   Mark Alejandro MD   metoprolol tartrate (Lopressor) 25 mg tablet Take 1 tablet (25 mg) by mouth once daily. 8/28/24 9/27/24  Rohit Boles PA-C   omeprazole (PriLOSEC) 40 mg DR capsule Take 1 capsule (40 mg) by mouth once daily. 8/28/24 5/25/25  Rohit Boles PA-C   rosuvastatin (Crestor) 10 mg tablet 1 tablet (10 mg) by g-tube route once daily. 2/22/24   Mark Alejandro MD   spironolactone (Aldactone) 25 mg tablet 1 tablet (25 mg) by g-tube route 1 time for 1 dose. 1 tablet DAILY (route: oral) 2/22/24 9/24/24  Mark Alejandro MD     ALLERGIES:   No Known Allergies    REVIEW OF SYSTEMS:  Review of Systems   Constitutional:  Negative for chills, fatigue and fever.   HENT: Negative.     Eyes: Negative.    Respiratory: Negative.     Cardiovascular: Negative.    Gastrointestinal:  Negative for abdominal distention, abdominal pain, nausea and vomiting.   Endocrine:  Negative.    Genitourinary: Negative.    Musculoskeletal: Negative.    Psychiatric/Behavioral: Negative.       PHYSICAL EXAM:  Physical Exam  Neurological: Awake, alert, conversive  Respiratory/Thorax: even, unlabored  Gastrointestinal: soft, NT, ND.  PEG with broken off access port in LUQ.   Skin: warm, dry  Musculoskeletal: SANTOYO  Eyes: non-icteric  Extremities: no edema   Psychological: appropriate mood/affect    LABS:  Results from last 7 days   Lab Units 01/11/25  0829   WBC AUTO x10*3/uL 5.5   HEMOGLOBIN g/dL 12.3   HEMATOCRIT % 37.5   PLATELETS AUTO x10*3/uL 176   NEUTROS PCT AUTO % 46.4   LYMPHS PCT AUTO % 41.1   MONOS PCT AUTO % 7.6   EOS PCT AUTO % 4.2         Results from last 7 days   Lab Units 01/11/25  0829   SODIUM mmol/L 145   POTASSIUM mmol/L 3.8   CHLORIDE mmol/L 110*   CO2 mmol/L 26   BUN mg/dL 23   CREATININE mg/dL 1.38*   CALCIUM mg/dL 9.9   PROTEIN TOTAL g/dL 7.3   BILIRUBIN TOTAL mg/dL 0.3   ALK PHOS U/L 60   ALT U/L 8   AST U/L 10   GLUCOSE mg/dL 116*     Results from last 7 days   Lab Units 01/11/25  0829   BILIRUBIN TOTAL mg/dL 0.3             I have reviewed all laboratory and imaging results ordered/pertinent for this encounter.

## 2025-01-11 NOTE — ED PROVIDER NOTES
History of Present Illness     History provided by: Patient  Limitations to History: None  External Records Reviewed:: Notes and past provider notes    HPI:  Pamella Rose is a 54 y.o. female history of CKD, GERD, history of tracheostomy, history of right maxillary squamous cell carcinoma/status post XRT/chemo, complication included osteoradionecrosis of the mandible, PEG tube dependent for majority of her nutrition.  She broke the access port off her PEG tube 3 days ago and has had decreased p.o. intake since then.  She describes no fevers or chills.  She describes no belly pain.  Patient does not know what size her PEG tube was.    Physical Exam   Triage vitals:  T    HR    BP    RR    O2      Awake alert and oriented x 4, no acute distress resting comfortably in hospital stretcher  Facial changes consistent with her postradiation and osteoradionecrosis, protecting her airway  2+ radial pulses, regular rate and rhythm  Abdomen is soft nontender nondistended, G-tube was in place however broken off at the access point without surrounding erythema or discharge.      Medical Decision Making & ED Course   Medical Decision Makin y.o. female who is hemodynamically stable presents to the emergency department for evaluation of a broken G-tube.  There is no documentation regarding the Colombian of the previous G-tube.  The old tube is currently keeping the passage open.  Given the difficulties ascertaining what type of tube this is we did consult surgery.  Given dry oral mucosa we did obtain labs and give her a liter of fluid.  Their evaluation and recommendation were pending at the time my signout to the oncoming provider.  ----         Social Determinants of Health which Significantly Impact Care: None identified       Chronic conditions affecting the patient's care: See HPI    The patient was discussed with the following consultants/services: Please see ED course for consult transcript        ED Course:  ED  Course as of 01/11/25 0714   Sat Jan 11, 2025   0645 There is no documentation regarding the Micronesian of this patient's G-tube [GA]   0654 PEG tube was placed by Kalen Aragon on 5/17/2022 [GA]   0659 ATTENDING ATTESTATION  34-year-old female with a history of remote facial squamous cell carcinoma with an indwelling PEG tube presents to the emergency department with complaint of PEG tube disruption.  The distal part of the PEG tube was excised a couple of days ago, she has had poor oral intake since then demonstrating dry tongue and poor skin turgor but stable vitals otherwise.  The PEG tube appears to be in good repair at the skin site, the anchor is in place appropriately no surrounding erythema no induration, the external portion of the PEG tube is broken, with no ability to utilize it.  We will address the patient's potential nutrition deficits we will check electrolytes, hydrate the patient.  We have been unable to source the size and type of PEG tube, surgery will see and evaluate the patient and we will try to get this replaced.  Patient will be signed out to the receiving ED team pending results and disposition  [facility] [RH]      ED Course User Index  [GA] Dudley Payton MD  [RH] Hola Olson, DO     Disposition   Patient was signed out to oncoming provider at 0700 pending completion of their work-up.  Please see the next provider's transition of care note for the remainder of the patient's care.     Procedures   Procedures    Patient was seen and discussed with the attending of record.    Dudley Payton MD  Emergency Medicine     Dudley Payton MD  Resident  01/11/25 0654       Dudley Payton MD  Resident  01/11/25 0715

## 2025-01-11 NOTE — PROCEDURES
Patient's old peg removed from LUQ of abdomen. For replacement, a Rudy key 20 Fr balloon gastrostomy tube was replaced easily. Gastric contents immediately returned from tube. The balloon was inflated with 8 ml saline and the tube was secured at 4cm by the cuff.   The patient tolerated the procedure without issue.   She was reminded that it should be replaced every 6 months.   No additional studies are needed. Patient may use PEG immediately.

## 2025-01-15 ASSESSMENT — ENCOUNTER SYMPTOMS
COUGH: 0
FEVER: 0
SORE THROAT: 0
TROUBLE SWALLOWING: 1
CONSTIPATION: 0
LEG SWELLING: 0
SHORTNESS OF BREATH: 0
VOMITING: 0
NAUSEA: 0
LIGHT-HEADEDNESS: 0
HEADACHES: 0
DIARRHEA: 0
ABDOMINAL PAIN: 0
ARTHRALGIAS: 1
CHILLS: 0

## 2025-01-15 NOTE — PROGRESS NOTES
Patient ID: Pamella Rose is a 54 y.o. female.  Diagnosis: Right maxillary sinus  squamous cell carcinoma  Staging: [id number]  Date of Diagnosis:    Providers:   ENT: Dr. Jay Delgado  MedOn: Dr. Kyunghee Burkitt / BABITA Hollins  RadOnc: Dr. Nadege Horton      Surgery:  2/25/22: Right maxillary biopsy showed poorly differentiated Squamous Cell Carcinoma with focal keratinization involving squmous mucosa.   3/20/22: Right Neck Exploration,  right scapular tip  free flap; maxilla reconstruction with placement of hardware   3/22/23: Right Mandibular Debridement w/ Dr. Delgado.  2/22/24: Debridement of mandible  9/24/24: Right lower eyelid repair and fat transfer from left thigh w/ liposuction and injection cannulas    Prior Therapies:  5/11 - 6/22/22: Completed concurrent chemoXRT w/ 7 weeks of Carboplatin (2mg/AUC/dose) + Paclitaxel (45mg/m2) and 66gy VMAT in 30 fx.     Oncologic Issues  Osteoradionecrosis  Dysphagia  Mastication difficulties     ONCOLOGIC HISTORY  - 2/4/22: Pt initially presented to Dr. Delgado with swelling of the upper gum area that didn’t improve with abx. Referred by who Dentist did an in-office xray and was concerned about a mass in the sinus  - 2/4/22 FNA showed some atypical epithelioid cells, but no definitive diagnosis  - 2/18/22: CT neck with contrast showed a large destructive mass centered in the right maxillary sinus with marked osseous destruction and extension beyond the sinus walls into the nasal cavity, hard palate, and periantral soft tissues. There is also  likely subtle extension into the inferior right orbit and possibly involvement of the infraorbital canal concerning for perineural extension of neoplasm. There are several enlarged and heterogeneous lymph nodes bilaterally compatible with metastasis.  - 2/25/22 Right maxillary biopsy showed poorly differentiated Squamous Cell Carcinoma with focal keratinization involving squmous mucosa.   - 3/14/22: PET/CT showed subcentimeter RUL  without significant metabolic activity, needs to be monitored with CT chest with contrast.  - 3/16/22 Surgery: Right hemimaxillectomy without orbital exenteration, bilateral  NE (Right leverl 1-4, Left level 1-3); DL,  bronchoscopy, e sophagoscopy; Rigid Nasal Endoscopy with biopsy; Open tracheostomy with Dr. Delgado. Left scapular tip and latissimus muscle free flap , maxillary reconstruction with placement of hardware, Orbital floor reconstruction  with placement of hardware with Dr. Al.  - 3/16/22 Pathology revealed 4 LNs (with metastatic adenosquamous carcinoma) and 1 with thyroid carcinoma (subcapsular micrometastasis), level Right 1B, bilateral level 2/3 lymph nodes, with PAOLA.  Patient will need US thyroid in future and potential total thyroidectomy.  - 3/22/22 Surgery:  Right Neck Exploration, r ight scapular tip free flap; m axilla reconstruction with placement of hardware with Dr. Al   - 3/25/22 Tumor Board: Recently found to have right maxilla mass underwent surgical resection, findings c/w adeno-squamous carcinoma, also found to have  metastatic thyroid carcinoma in left level 2/3 lymph node. Rec: Adjuvant chemoradiation; total thyroidectomy  - 5/11/ - 6/22: Completed concurrent chemoXRT w/ 7 weeks of Carboplatin (2mg/AUC/dose) + Paclitaxel (45mg/m2) and 66gy VMAT in 30 fx.   - 3/22/23: Right Mandibular Debridement w/ Dr. Delgado.  - 7/10/23 FNA of a left cervical LN. Path showed polymorphous lymphoid tissue with no carcinoma identified.       Admission:  - 6/23 - 6/28/23: Admitted for Acute Otitis externa and febrile neutropenia. Received broad spectrum IV antibiotics. 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      Past Medical History:   Past Medical History:  No date: Anxiety  No date: CKD (chronic kidney disease)      Comment:  Stage 3  No date: Depression  No date: Dysphagia  09/25/2023: Encounter for electrocardiogram       Comment:  Normal sinus rhythm Normal ECG  No date: GERD (gastroesophageal reflux disease)  No date: History of sinus cancer      Comment:  Right maxillary sinus  squamous cell carcinoma s/p                concurrent chemoXRT  : Hx of tracheostomy  No date: Hyperaldosteronism  No date: Hyperlipidemia  No date: Hypertension  No date: Hypothyroidism  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  2. Normal global left ventricular systolic function.  3. Moderate to severe LVH and hypertensive response to exercise without any regional wall motion abnormalities at 61% MPHR.     SECTION, LOW TRANSVERSE      COLONOSCOPY      CT ANGIO AORTA AND BILATERAL ILIOFEMORAL RUN OFF INCLUDING WITHOUT CONTRAST IF PERFORMED  2023    CT AORTA AND BILATERAL ILIOFEMORAL RUNOFF ANGIOGRAM W AND/OR WO IV CONTRAST 2023 [facility] CT    ECHOCARDIOGRAM 2 D M MODE PANEL  2016    The left ventricular systolic function is normal with a 60-65% ejection fraction.  Spectral Doppler shows an impaired relaxation pattern of left ventricular diastolic filling. There is moderate to severe concentric left ventricular hypertrophy.    IR INTERVENTION VENOUS SAMPLING  2021    IR INTERVENTION VENOUS SAMPLING 2021 [facility]    IR INTERVENTION VENOUS SAMPLING  2021    IR INTERVENTION VENOUS SAMPLING 2021 [facility]GAC[facility]    OTHER SURGICAL HISTORY      right maxillectomy scapula free flap    OTHER SURGICAL HISTORY      right mandibular debridement      Family History:    Family History   Problem Relation Name Age of  Onset    Hypertension Mother      Other (CABG) Mother      Hypertension Father      Lung cancer Father      Colon cancer Other grandfather      Family Oncology History:    Cancer-related family history includes Colon cancer in an other family member; Lung cancer in her father.  Social History:    Social History     Tobacco Use    Smoking status: Every Day     Current packs/day: 0.25     Average packs/day: 0.3 packs/day for 40.0 years (10.0 ttl pk-yrs)     Types: Cigarettes     Start date: 1/1/1985     Passive exposure: Never    Smokeless tobacco: Never   Vaping Use    Vaping status: Never Used   Substance Use Topics    Alcohol use: Yes     Comment: OCCASIONALLY    Drug use: Yes     Types: Marijuana     Comment: ADMITS TO GUMMIES        Chief Complaint: Squamous Cell Carcinoma of Maxillary Sinus    HPI  Pamella Rose is a 54 y.o. female with h/o tight maxillary sinus squamous cell carcinoma. [id number].  S/p right hemimaxillectomy, left scapula flap with revision and right scapula tip free flap, bilateral ND. Completed concurrent chemoXRT w/ 7 cycles of Carbo/Taxol and 66gy VMAT on 6/22/22.     Interval History  Patient presents for 2 year 7 months follow up and reports the following:    Patient reports she's feeling well.   She had right lower eyelid repair on 9/24/24. She still has epiphora from the right eye and now the right lower lid is dropping again.   She's doing swallowing exercises but still has dysphagia, and feels foods are stuck more often and she's coughing more often after eating/drinking. She also has trismus and facial asymmetry.   She has significant lymphedema and scar tissue in the submental/neck area. She was previously referred to lymphedema therapy, but unclear if he completed therapy.   She has acid reflux which she take Omeprazole PRN  She has been compliant with Levothyroxine 137mcg QAM. Energy improved since starting levothyroxine consistently.    ROS  Review of Systems   Constitutional:   Negative for chills and fever.   HENT:   Positive for hearing loss and trouble swallowing. Negative for lump/mass, mouth sores, nosebleeds and sore throat.    Respiratory:  Negative for cough and shortness of breath.    Cardiovascular:  Negative for chest pain and leg swelling.   Gastrointestinal:  Negative for abdominal pain, constipation, diarrhea, nausea and vomiting.   Musculoskeletal:  Positive for arthralgias (shoulder pain).   Skin:  Negative for rash.   Neurological:  Positive for numbness (Numbness in right cheek). Negative for headaches and light-headedness.     Allergies  No Known Allergies     Medications  Current Outpatient Medications   Medication Instructions    amLODIPine (NORVASC) 10 mg, g-tube, Daily    chlorhexidine (Peridex) 0.12 % solution Take 15 mL by mouth 2 (two) times daily    FLUoxetine (PROZAC) 20 mg, g-tube, Daily    hydrALAZINE (Apresoline) 50 mg tablet 1 tablet, 3 times daily (0900,1400,1900)    hydroCHLOROthiazide (HYDRODIURIL) 25 mg, Daily RT    levothyroxine (SYNTHROID, LEVOXYL) 137 mcg, g-tube, Daily before breakfast    lisinopril 40 mg, g-tube, Daily    lubricating eye drops ophthalmic solution 1 drop, Right Eye, 4 times daily    metFORMIN XR (GLUCOPHAGE-XR) 500 mg, oral, Daily with evening meal    metoprolol succinate XL (TOPROL-XL) 50 mg, oral, Daily, Do not crush or chew. HOLD UNTIL CLEARED FOR ORAL DIET    metoprolol tartrate (LOPRESSOR) 25 mg, oral, Daily    nicotine (NICODERM CQ) 2 mg    omeprazole (PRILOSEC) 40 mg, oral, Daily    pravastatin (Pravachol) 40 mg tablet oral, Daily RT    rosuvastatin (CRESTOR) 10 mg, g-tube, Daily    spironolactone (ALDACTONE) 25 mg, g-tube, Once, 1 tablet DAILY (route: oral)    terazosin (HYTRIN) 5 mg, oral, Nightly    vitamin E mixed 100 unit tablet Daily RT     OBJECTIVE:  VS:  /76 (BP Location: Left arm, Patient Position: Sitting, BP Cuff Size: Adult)   Pulse 82   Temp 36.5 °C (97.7 °F) (Temporal)   Resp 18   Wt 80.7 kg (178 lb)    LMP  (LMP Unknown)   SpO2 95%   BMI 30.55 kg/m²   Daily Weight  01/16/25 : 80.7 kg (178 lb)  01/11/25 : 79.4 kg (175 lb)  12/20/24 : 77.1 kg (170 lb)  09/24/24 : 79.3 kg (174 lb 13.2 oz)  09/09/24 : 77.6 kg (171 lb 1.2 oz)  08/28/24 : 77.6 kg (171 lb)  08/06/24 : 79.1 kg (174 lb 6.1 oz)    Physical Exam  Constitutional:       Appearance: Normal appearance. She is well-developed.   HENT:      Head: Normocephalic and atraumatic.      Comments: Post treatment changes in right side of face  Facial asymmetry       Right Ear: External ear normal. No tenderness.      Left Ear: External ear normal. No tenderness.      Nose: Nose normal.      Mouth/Throat:      Mouth: Mucous membranes are moist. No injury or oral lesions.      Tongue: No lesions.      Pharynx: Oropharynx is clear.      Comments: Edentulous, trismus with limited oral opening  Eyes:      Extraocular Movements: Extraocular movements intact.      Conjunctiva/sclera: Conjunctivae normal.      Pupils: Pupils are equal, round, and reactive to light.      Comments: Ectropion in right eye   Neck:      Thyroid: No thyroid mass.      Comments: Neck stiffness R>L, significant submental/neck lymphedema.   Cardiovascular:      Rate and Rhythm: Normal rate and regular rhythm.   Pulmonary:      Effort: Pulmonary effort is normal. No respiratory distress.      Breath sounds: Normal breath sounds.   Abdominal:      General: Bowel sounds are normal. There is no distension or abdominal bruit.      Palpations: Abdomen is soft. There is no mass.      Tenderness: There is no abdominal tenderness.   Musculoskeletal:         General: Normal range of motion.      Cervical back: Full passive range of motion without pain, normal range of motion and neck supple. No signs of trauma. Normal range of motion.      Right lower leg: No edema.      Left lower leg: No edema.   Lymphadenopathy:      Cervical: No cervical adenopathy.      Upper Body:      Right upper body: No axillary  adenopathy.      Left upper body: No axillary adenopathy.   Skin:     General: Skin is warm and dry.      Findings: No lesion or rash.   Neurological:      General: No focal deficit present.      Mental Status: She is alert and oriented to person, place, and time.      Gait: Gait is intact.   Psychiatric:         Mood and Affect: Mood and affect normal.         Behavior: Behavior is cooperative.     Diagnostic Results     Labs  Results from last 7 days   Lab Units 01/16/25  1236 01/11/25  0829   WBC AUTO x10*3/uL 5.8 5.5   HEMOGLOBIN g/dL 12.4 12.3   HEMATOCRIT % 39.6 37.5   PLATELETS AUTO x10*3/uL 189 176   NEUTROS ABS x10*3/uL 2.96 2.55   LYMPHS ABS AUTO x10*3/uL 2.21 2.26   MONOS ABS AUTO x10*3/uL 0.39 0.42   EOS ABS AUTO x10*3/uL 0.24 0.23   NEUTROS PCT AUTO % 50.7 46.4   LYMPHS PCT AUTO % 37.8 41.1   MONOS PCT AUTO % 6.7 7.6   EOS PCT AUTO % 4.1 4.2     Results from last 7 days   Lab Units 01/16/25  1236 01/11/25  0829   GLUCOSE mg/dL 150* 116*   SODIUM mmol/L 140 145   POTASSIUM mmol/L 4.0 3.8   CHLORIDE mmol/L 106 110*   CO2 mmol/L 25 26   BUN mg/dL 20 23   CREATININE mg/dL 1.23* 1.38*   EGFR mL/min/1.73m*2 52* 45*   CALCIUM mg/dL 9.9 9.9   MAGNESIUM mg/dL  --  2.02   ALBUMIN g/dL 4.2 4.1   PROTEIN TOTAL g/dL 7.2 7.3   BILIRUBIN TOTAL mg/dL 0.3 0.3   ALK PHOS U/L 56 60   ALT U/L 9 8   AST U/L 10 10                        Images    Assessment/Plan     ASSESSMENT  Pamella Rose is a 54 y.o. female with h/o tight maxillary sinus squamous cell carcinoma. [id number].  S/p right hemimaxillectomy, left scapula flap with revision and right scapula tip free flap, bilateral ND. From 5/11/ - 6/22/22 she completed concurrent chemoXRT w/ 7 weeks of Carboplatin (2mg/AUC/dose) + Paclitaxel (45mg/m2) and 66gy VMAT  in 30 fx.      # Right maxillary sinus squamous cell carcinoma  - 3/16/22 Right hemimaxillectomy, left scapula flap with revision and right scapula tip free flap, b/l ND with negative margins.   - Since surgery  she has pain in right side of face, tingling and swelling in right cheek, swelling has improved this week. Following Supp Onc (Sarah NETTLES) for pain management.  - 6/22/22 Completed concurrent chemoXRT w/ 7 weeks of Carboplatin (2mg/AUC/dose) + Paclitaxel (45mg/m2) and 66gy VMAT in 30 fx  - Unfortunately was hospitalized 6/26 -6/28 for febrile neutropenia and otitis externa, d/c home on oral abx, and eye and ear drops.   - 11/18/22: post treatment PET/CT showed good response, increased metabolic activity along the right maxillary bone graft, likely reactive. Patient was seen by Dr. Delgado in [place], per  his exam, she has MAYELA.  - 3/16/23: ongoing lymphedema of right face/mandible and lateral nose with neuropathy and pressure pain.  Pain is managed by supp onc. She is scheduled to have procedure next week to  work on exposed bone around mandible.  - 3/22/23: s/p right mandibular debridement  - 4/26/23 MRI Orbits showed post op changes w/ interval decrease in the enhancement in the surgical bed and surround soft tissues.   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  - 7/10/23 FNA of left parotic nodule showed polymorphous lymphoid tissue w/ no carcinoma identified.   - 7/20/23: Patient is stable overall, has chronic SEs from chemoradiation including chronic facial and neck swelling/lymphedema, right shoulder contracture with decreases ROM, weakness and right arm neuropathy. Has chronic fatigue. Patient presented me with  Medical Source Statement to complete from her .   - 10/5/23: Patient with about 1.5 cm open wound under right mandible at junction with neck. Has been draining for 2 weeks, drainage is mucoid mixed with blood. No fevers/chills, no erythema. Unfortunately no labs today, however does not appear to be infected. She does have wound care appts set up but unclear if patient is going to these appts consistently as she cites issues with  "transportation.   - 2/9/24: Patient admitted for osteoradionecrosis a few months ago. She did not continue HPO therapy.   - 4/16/24: Patient had mandibular debridement and reconstruction with Dr. Delgado on 2/22. She has swelling, pain and neuropathy in the right mandible. She does have muscle tightness in the right neck with spasms. She has stopped smoking a couple months ago.  - 8/28/24: Patient feeling at her baseline. Was not able to have ectropion repair on 8/5 due to uncontrolled hypothyroidism. Still has pain/stiffness in right face. Trouble swallowing. Chronic SE are stable.   - 1/16/25: Patient is s/p right lower eyelid ectropion repair on 9/24/24. She still has mild ectropion in the right lower lid with some ephiphora. Has facial asymmetry, stable trismus, significant submental and neck lymphedema and stiffness.     # Dysphagia  - Reports chronic dysphagia that has worsened with feeling of food getting stuck in throat more frequently and increased coughing after eating/drinking. Will refer back to SLP as patient has not followed with SLP since May 2024.     # GERD  - Acid reflux, controlling with Omeprazole 40mg PRN.     # Trismus  - Pt with trismus and jaw fatigue, stable in the last year or so.     # Thyroid carcinoma   - 3/16/22 Pathology showing 1 left level 2/3 LN with thyroid carcinoma (subcapsular micrometastasis)  - 6/24/22 CT Neck showed Similar appearance of the thyroid gland with sub-centimeter hypodensities in the right greater than left lobe.  - 11/18/22 restaging scan did not find anything remarkable in the thyroid  - Dr. Delgado is following this, per his 6/16 FUV \"since this was an incidental micromets, and there are no significant nodules after chemoradiation, would just observe with serial ultrasounds at this time; pt has significantly scarred neck and likely no residual  disease in the thyroid\"    # Hypothroidism  - 8/11/22 TSH 3.74  - 12/1/22: TSH 17.20  - 2/17/23: started synthroid 50mcg on " 2/17/23 based on 12/1/22 TSH level.  - 4/13/23 TSH 14.91  - 6/22/23 TSH 29.7  - 7/20/23 TSH 32.9. Levothyroxine increased to 88mcg. Recheck TSH/T4 on 8/25  - 10/5/23: Patient reports she has not been taking levothyroxine. Patient did not go to lab today to have TSH redrawn. Levothyroxine 100mg was sent to [facility] pharmacy for patient to  today.   - 2/8/24: TSH 15.32, T4 1.01. Patient to increase Levothyroxine to 112mcg.   - 4/16/24: TSH 36.12, T4 1.01. Will increase to 137mcg.   - 8/28/24: TSH 1.85. Will continue levothyroxine 137mcg  - 1/16/25: TSH pending    # Right eye ptosis/epiphora/possible conjunctivitis  - Right eye ptosis and epiphora, likely related to prior surgery/radiation. No vision change/eye pain  - redness/itchiness in right eye for a while. continue treat her for potential conjunctivitis with Floxin otic drop  - S/p Right ectropion repair on 9/24/24. Patient still has mild ectropion 4 months after surgery with some epiphora.      # Lymphedema  - Right side of face and neck. Swelling fluctuates. Start twice a week lymphedema therapy on 10/31/22. Has had good improvement on the swelling though still has lymphedema at baseline.  - 1/16/25: Significant lymphedema in the submental and neck region, neck muscles very stiff. Will refer back to lymphedema therapy     # Right ear hearing loss  - Patient c/o subjective hearing loss in right ear, ear still feels full, sounds muffled 3 months after treatment  - Was referred to Audiology and had audiogram scheduled 11/11/22, however patient missed this appointment.   - 10/5/23: Patient again endorsed worsening hearing. Referral made again for Audiology and patient is agreeable.       # CKD, stage 3  - Following with Dr. Ana Lee  - Today Cr 1.23, GFR 52, around her baseline. Encourage patient to keep up with hydration via PEG and PO.     # Depression  - Following Sushil Bryan ([facility]), was started on Fluoxetine 20mg and Olanzapine 2.5mg     #  Hypertension  - 148/97, HR 73. Patient has PCP at [facility], reports she's on Lisinopril, Amlodipine, Spironolactone, Metoprolol, and is on a statin. She reports missing some medications here and there. Advised patient to taking her BP meds daily.   - BP wnl in clinic today  - 8/28: Patient requested refill of Lisinopril 40mg, Metoprolol 25mg every day for her HTN. She will follow up with her PCP at [facility] for future refills and management.     PLAN:  -- 1/31/25 CT Facial Bones w/o contrast  -- RTC 3 months on 4/23/25 w/ MedOnc, labs cbc, cmp, tsh/t4  -- 5/9/25 NPV w/ Dr. Heart (taking over for Dr. Delgado)  -- Continue Levothyroxine 137mcg.   -- Continue Omeprazole to 40mg QD PRN for GERD  -- Referral to Lymphedema therapy for neck swelling  -- Referral to SLP for worsening dysphagia

## 2025-01-16 ENCOUNTER — OFFICE VISIT (OUTPATIENT)
Dept: HEMATOLOGY/ONCOLOGY | Facility: HOSPITAL | Age: 55
End: 2025-01-16
Payer: COMMERCIAL

## 2025-01-16 ENCOUNTER — OFFICE VISIT (OUTPATIENT)
Dept: OTOLARYNGOLOGY | Facility: HOSPITAL | Age: 55
End: 2025-01-16
Payer: COMMERCIAL

## 2025-01-16 ENCOUNTER — LAB (OUTPATIENT)
Dept: LAB | Facility: HOSPITAL | Age: 55
End: 2025-01-16
Payer: COMMERCIAL

## 2025-01-16 VITALS
DIASTOLIC BLOOD PRESSURE: 76 MMHG | TEMPERATURE: 97.7 F | HEART RATE: 82 BPM | WEIGHT: 178 LBS | OXYGEN SATURATION: 95 % | SYSTOLIC BLOOD PRESSURE: 132 MMHG | BODY MASS INDEX: 30.55 KG/M2 | RESPIRATION RATE: 18 BRPM

## 2025-01-16 DIAGNOSIS — C41.1 CANCER OF INFERIOR MAXILLA (MULTI): ICD-10-CM

## 2025-01-16 DIAGNOSIS — I89.0 LYMPHEDEMA DUE TO AND NOT CONCURRENT WITH IONIZING RADIOTHERAPY: ICD-10-CM

## 2025-01-16 DIAGNOSIS — E03.9 HYPOTHYROIDISM, UNSPECIFIED TYPE: ICD-10-CM

## 2025-01-16 DIAGNOSIS — H02.102 ECTROPION OF RIGHT LOWER EYELID, UNSPECIFIED ECTROPION TYPE: ICD-10-CM
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
## 2025-01-16 DIAGNOSIS — R13.10 DYSPHAGIA, UNSPECIFIED TYPE: ICD-10-CM

## 2025-01-16 DIAGNOSIS — C41.1 CANCER OF INFERIOR MAXILLA (MULTI): Primary | ICD-10-CM

## 2025-01-16 DIAGNOSIS — K21.9 GASTROESOPHAGEAL REFLUX DISEASE, UNSPECIFIED WHETHER ESOPHAGITIS PRESENT: ICD-10-CM

## 2025-01-16 DIAGNOSIS — R25.2 TRISMUS: ICD-10-CM

## 2025-01-16 LAB
ALBUMIN SERPL BCP-MCNC: 4.2 G/DL (ref 3.4–5)
ALP SERPL-CCNC: 56 U/L (ref 33–110)
ALT SERPL W P-5'-P-CCNC: 9 U/L (ref 7–45)
ANION GAP SERPL CALC-SCNC: 13 MMOL/L (ref 10–20)
AST SERPL W P-5'-P-CCNC: 10 U/L (ref 9–39)
BASOPHILS # BLD AUTO: 0.03 X10*3/UL (ref 0–0.1)
BASOPHILS NFR BLD AUTO: 0.5 %
BILIRUB SERPL-MCNC: 0.3 MG/DL (ref 0–1.2)
BUN SERPL-MCNC: 20 MG/DL (ref 6–23)
CALCIUM SERPL-MCNC: 9.9 MG/DL (ref 8.6–10.3)
CHLORIDE SERPL-SCNC: 106 MMOL/L (ref 98–107)
CO2 SERPL-SCNC: 25 MMOL/L (ref 21–32)
CREAT SERPL-MCNC: 1.23 MG/DL (ref 0.5–1.05)
EGFRCR SERPLBLD CKD-EPI 2021: 52 ML/MIN/1.73M*2
EOSINOPHIL # BLD AUTO: 0.24 X10*3/UL (ref 0–0.7)
EOSINOPHIL NFR BLD AUTO: 4.1 %
ERYTHROCYTE [DISTWIDTH] IN BLOOD BY AUTOMATED COUNT: 16.1 % (ref 11.5–14.5)
GLUCOSE SERPL-MCNC: 150 MG/DL (ref 74–99)
HCT VFR BLD AUTO: 39.6 % (ref 36–46)
HGB BLD-MCNC: 12.4 G/DL (ref 12–16)
IMM GRANULOCYTES # BLD AUTO: 0.01 X10*3/UL (ref 0–0.7)
IMM GRANULOCYTES NFR BLD AUTO: 0.2 % (ref 0–0.9)
LYMPHOCYTES # BLD AUTO: 2.21 X10*3/UL (ref 1.2–4.8)
LYMPHOCYTES NFR BLD AUTO: 37.8 %
MCH RBC QN AUTO: 29 PG (ref 26–34)
MCHC RBC AUTO-ENTMCNC: 31.3 G/DL (ref 32–36)
MCV RBC AUTO: 93 FL (ref 80–100)
MONOCYTES # BLD AUTO: 0.39 X10*3/UL (ref 0.1–1)
MONOCYTES NFR BLD AUTO: 6.7 %
NEUTROPHILS # BLD AUTO: 2.96 X10*3/UL (ref 1.2–7.7)
NEUTROPHILS NFR BLD AUTO: 50.7 %
NRBC BLD-RTO: 0 /100 WBCS (ref 0–0)
PLATELET # BLD AUTO: 189 X10*3/UL (ref 150–450)
POTASSIUM SERPL-SCNC: 4 MMOL/L (ref 3.5–5.3)
PROT SERPL-MCNC: 7.2 G/DL (ref 6.4–8.2)
RBC # BLD AUTO: 4.28 X10*6/UL (ref 4–5.2)
SODIUM SERPL-SCNC: 140 MMOL/L (ref 136–145)
T4 FREE SERPL-MCNC: 1.38 NG/DL (ref 0.78–1.48)
TSH SERPL-ACNC: 6.73 MIU/L (ref 0.44–3.98)
WBC # BLD AUTO: 5.8 X10*3/UL (ref 4.4–11.3)

## 2025-01-16 PROCEDURE — 99215 OFFICE O/P EST HI 40 MIN: CPT | Performed by: STUDENT IN AN ORGANIZED HEALTH CARE EDUCATION/TRAINING PROGRAM

## 2025-01-16 PROCEDURE — 99213 OFFICE O/P EST LOW 20 MIN: CPT | Performed by: OTOLARYNGOLOGY

## 2025-01-16 PROCEDURE — 84443 ASSAY THYROID STIM HORMONE: CPT

## 2025-01-16 PROCEDURE — 3078F DIAST BP <80 MM HG: CPT | Performed by: STUDENT IN AN ORGANIZED HEALTH CARE EDUCATION/TRAINING PROGRAM

## 2025-01-16 PROCEDURE — 36415 COLL VENOUS BLD VENIPUNCTURE: CPT

## 2025-01-16 PROCEDURE — 84439 ASSAY OF FREE THYROXINE: CPT

## 2025-01-16 PROCEDURE — 80053 COMPREHEN METABOLIC PANEL: CPT

## 2025-01-16 PROCEDURE — 85025 COMPLETE CBC W/AUTO DIFF WBC: CPT

## 2025-01-16 PROCEDURE — 3075F SYST BP GE 130 - 139MM HG: CPT | Performed by: STUDENT IN AN ORGANIZED HEALTH CARE EDUCATION/TRAINING PROGRAM

## 2025-01-16 RX ORDER — ASCORBIC ACID
CRYSTALS ORAL
COMMUNITY
Start: 2023-06-12

## 2025-01-16 RX ORDER — NICOTINE 7MG/24HR
2 PATCH, TRANSDERMAL 24 HOURS TRANSDERMAL
COMMUNITY
Start: 2022-10-11

## 2025-01-16 RX ORDER — HYDROCHLOROTHIAZIDE 25 MG/1
25 TABLET ORAL
COMMUNITY
Start: 2024-10-07

## 2025-01-16 RX ORDER — PRAVASTATIN SODIUM 40 MG/1
TABLET ORAL
COMMUNITY

## 2025-01-16 RX ORDER — TERAZOSIN 5 MG/1
5 CAPSULE ORAL NIGHTLY
COMMUNITY

## 2025-01-16 RX ORDER — METFORMIN HYDROCHLORIDE 500 MG/1
500 TABLET, EXTENDED RELEASE ORAL
COMMUNITY

## 2025-01-16 RX ORDER — HYDRALAZINE HYDROCHLORIDE 50 MG/1
1 TABLET, FILM COATED ORAL
COMMUNITY
Start: 2024-10-07

## 2025-01-16 ASSESSMENT — ENCOUNTER SYMPTOMS: NUMBNESS: 1

## 2025-01-16 ASSESSMENT — PAIN SCALES - GENERAL: PAINLEVEL_OUTOF10: 0-NO PAIN

## 2025-01-16 NOTE — PROGRESS NOTES
"ENT Subsequent Encounter Clinic Note  Name: Pamella Rose  MRN: [id number]  : 1970  CC: follow up from previous scapular free flap    Interval History  Pamella Rose is a 53 y.o. female presents for follow up.  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 Had to have a revision scapula due to loss of first flap. She has been dealing with ORN of the mandible and was on HBO. She was admitted with neck drainage on -. She underwent a combined transcervical and endoscopic approach to debridement of her mandible on 24. She was seen on 3/15/24. At that time, she did endorse some dysphagia, but did not have any drainage. She was last seen 2024 by Dr. Delgado and noted to be doing well overall. A barium swallow study was recommended but the patient did not proceed with imaging.     Today, patient has no complaints of neck drainage, does note that she feels a \"growth\" at her upper alveolar ridge just left of midline that may represent the plate. The patient has not had a CT scan since 24.     Review of Systems  A complete 14 point review of systems was obtained and was negative other than what is stated in the history of present illness.    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  2. Normal global left ventricular systolic function.  3. Moderate to severe LVH and hypertensive response to exercise without any regional wall motion abnormalities at 61% MPHR.     SECTION, LOW TRANSVERSE      COLONOSCOPY      CT ANGIO AORTA AND BILATERAL ILIOFEMORAL RUN OFF INCLUDING WITHOUT CONTRAST IF PERFORMED  2023    CT AORTA AND BILATERAL ILIOFEMORAL RUNOFF ANGIOGRAM W AND/OR WO IV CONTRAST 2023 [place] CT    ECHOCARDIOGRAM 2 D M MODE PANEL  2016    The left ventricular systolic function is normal with a 60-65% ejection fraction.  Spectral Doppler shows an impaired relaxation pattern of left ventricular diastolic filling. There is moderate to severe concentric left ventricular hypertrophy.    IR INTERVENTION VENOUS SAMPLING  2021    IR INTERVENTION VENOUS SAMPLING 2021 [facility] [place] [facility]    IR INTERVENTION VENOUS SAMPLING  2021    IR INTERVENTION VENOUS SAMPLING 2021 [facility] [place] [facility]    OTHER SURGICAL HISTORY      right maxillectomy scapula free flap    OTHER SURGICAL HISTORY      right mandibular debridement       Medications  Current Outpatient Medications on File Prior to Visit   Medication Sig Dispense Refill    hydrALAZINE (Apresoline) 50 mg tablet Take 1 tablet (50 mg) by mouth 3 times a day.      hydroCHLOROthiazide (HYDRODiuril) 25 mg tablet Take 1 tablet (25 mg) by mouth once daily.      nicotine (Nicoderm CQ) 7 mg/24 hr patch Place 2 mg on the skin.      vitamin E mixed 100 unit tablet Take by mouth once daily.      amLODIPine (Norvasc) 10 mg tablet 1 tablet (10 mg) by g-tube route once daily.      chlorhexidine (Peridex) 0.12 % solution Take 15 mL by mouth 2 (two) times daily 1419 mL 5    FLUoxetine (PROzac) 20 mg tablet 1 tablet (20 mg) by g-tube route once daily.      levothyroxine (Synthroid, Levoxyl) 137  mcg tablet 1 tablet (137 mcg) by g-tube route once daily in the morning. Take before meals. 30 tablet 11    lisinopril 40 mg tablet 1 tablet (40 mg) by g-tube route once daily. 30 tablet 0    lubricating eye drops ophthalmic solution Administer 1 drop into the right eye 4 times a day. (Patient not taking: Reported on 12/20/2024) 60 each 1    metFORMIN  mg 24 hr tablet Take 1 tablet (500 mg) by mouth once daily in the evening. Take with meals.      metoprolol succinate XL (Toprol-XL) 50 mg 24 hr tablet Take 1 tablet (50 mg) by mouth once daily. Do not crush or chew. HOLD UNTIL CLEARED FOR ORAL DIET      metoprolol tartrate (Lopressor) 25 mg tablet Take 1 tablet (25 mg) by mouth once daily. 30 tablet 0    omeprazole (PriLOSEC) 40 mg DR capsule Take 1 capsule (40 mg) by mouth once daily. 90 capsule 2    pravastatin (Pravachol) 40 mg tablet Take by mouth once daily.      rosuvastatin (Crestor) 10 mg tablet 1 tablet (10 mg) by g-tube route once daily.      spironolactone (Aldactone) 25 mg tablet 1 tablet (25 mg) by g-tube route 1 time for 1 dose. 1 tablet DAILY (route: oral)      terazosin (Hytrin) 5 mg capsule Take 1 capsule (5 mg) by mouth once daily at bedtime.       No current facility-administered medications on file prior to visit.       Allergies  No Known Allergies    Social History  Social History     Tobacco Use    Smoking status: Every Day     Current packs/day: 0.25     Average packs/day: 0.3 packs/day for 40.0 years (10.0 ttl pk-yrs)     Types: Cigarettes     Start date: 1/1/1985     Passive exposure: Never    Smokeless tobacco: Never   Vaping Use    Vaping status: Never Used   Substance Use Topics    Alcohol use: Yes     Comment: OCCASIONALLY    Drug use: Yes     Types: Marijuana     Comment: ADMITS TO GUMMIES       Family History  Family History   Problem Relation Name Age of Onset    Hypertension Mother      Other (CABG) Mother      Hypertension Father      Lung cancer Father      Colon cancer Other  grandfather        Physical Examination  NAD alert  bharti eomi NCAT  mild ectropion  facial swelling stable, no masses or lesions of lip.  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  Neck wound healing well, no purulence or infection          Impression  Pamella Rose is a 54 y.o. female who presents to ENT clinic today for follow up. History of scapular free flap for R sided maxillary cancer. Patient is doing well overall     Plan  - has a small area of exposed plate along the upper gingiva but not symptomatic  - will get a follow up CT to look at the mandible ORN  - Follow up with Dr. Heart in 4-6 months

## 2025-01-16 NOTE — PATIENT INSTRUCTIONS
- Referral to Physical Therapy for Lymphedema Therapy  - Referral to Speech/Swallowing  - CT Maxillofacial bones without contrast     

## 2025-01-17 PROCEDURE — RXMED WILLOW AMBULATORY MEDICATION CHARGE

## 2025-01-20 ENCOUNTER — PHARMACY VISIT (OUTPATIENT)
Dept: PHARMACY | Facility: CLINIC | Age: 55
End: 2025-01-20
Payer: MEDICAID

## 2025-01-31 ENCOUNTER — HOSPITAL ENCOUNTER (OUTPATIENT)
Dept: RADIOLOGY | Facility: HOSPITAL | Age: 55
Discharge: HOME | End: 2025-01-31
Payer: COMMERCIAL

## 2025-01-31 DIAGNOSIS — R13.10 DYSPHAGIA, UNSPECIFIED TYPE: ICD-10-CM

## 2025-01-31 DIAGNOSIS — C41.1 CANCER OF INFERIOR MAXILLA (MULTI): ICD-10-CM

## 2025-01-31 PROCEDURE — 70486 CT MAXILLOFACIAL W/O DYE: CPT

## 2025-02-11 PROCEDURE — RXMED WILLOW AMBULATORY MEDICATION CHARGE

## 2025-02-12 ENCOUNTER — PHARMACY VISIT (OUTPATIENT)
Dept: PHARMACY | Facility: CLINIC | Age: 55
End: 2025-02-12
Payer: MEDICAID

## 2025-03-29 PROCEDURE — RXMED WILLOW AMBULATORY MEDICATION CHARGE

## 2025-03-31 ASSESSMENT — ENCOUNTER SYMPTOMS
CHILLS: 0
LIGHT-HEADEDNESS: 0
FEVER: 0
DIARRHEA: 0
ARTHRALGIAS: 1
NAUSEA: 0
NUMBNESS: 1
SHORTNESS OF BREATH: 0
VOMITING: 0
SORE THROAT: 0
TROUBLE SWALLOWING: 1
COUGH: 0
ABDOMINAL PAIN: 0
HEADACHES: 0
CONSTIPATION: 0
LEG SWELLING: 0

## 2025-03-31 NOTE — PROGRESS NOTES
Patient ID: Pamella Rose is a 54 y.o. female.  Diagnosis: Right maxillary sinus  squamous cell carcinoma  Staging: [id number]  Date of Diagnosis:    Providers:   ENT: Dr. Jay Delgado  MedOn: Dr. Kyunghee Burkitt / BABITA Hollins  RadOnc: Dr. Nadege Horton      Surgery:  2/25/22: Right maxillary biopsy showed poorly differentiated Squamous Cell Carcinoma with focal keratinization involving squmous mucosa.   3/20/22: Right Neck Exploration,  right scapular tip  free flap; maxilla reconstruction with placement of hardware   3/22/23: Right Mandibular Debridement w/ Dr. Delgado.  2/22/24: Debridement of mandible  9/24/24: Right lower eyelid repair and fat transfer from left thigh w/ liposuction and injection cannulas    Prior Therapies:  5/11 - 6/22/22: Completed concurrent chemoXRT w/ 7 weeks of Carboplatin (2mg/AUC/dose) + Paclitaxel (45mg/m2) and 66gy VMAT in 30 fx.     Oncologic Issues  Osteoradionecrosis  Dysphagia  Mastication difficulties     ONCOLOGIC HISTORY  - 2/4/22: Pt initially presented to Dr. Delgado with swelling of the upper gum area that didn’t improve with abx. Referred by who Dentist did an in-office xray and was concerned about a mass in the sinus  - 2/4/22 FNA showed some atypical epithelioid cells, but no definitive diagnosis  - 2/18/22: CT neck with contrast showed a large destructive mass centered in the right maxillary sinus with marked osseous destruction and extension beyond the sinus walls into the nasal cavity, hard palate, and periantral soft tissues. There is also  likely subtle extension into the inferior right orbit and possibly involvement of the infraorbital canal concerning for perineural extension of neoplasm. There are several enlarged and heterogeneous lymph nodes bilaterally compatible with metastasis.  - 2/25/22 Right maxillary biopsy showed poorly differentiated Squamous Cell Carcinoma with focal keratinization involving squmous mucosa.   - 3/14/22: PET/CT showed subcentimeter RUL  without significant metabolic activity, needs to be monitored with CT chest with contrast.  - 3/16/22 Surgery: Right hemimaxillectomy without orbital exenteration, bilateral  NE (Right leverl 1-4, Left level 1-3); DL,  bronchoscopy, e sophagoscopy; Rigid Nasal Endoscopy with biopsy; Open tracheostomy with Dr. Delgado. Left scapular tip and latissimus muscle free flap , maxillary reconstruction with placement of hardware, Orbital floor reconstruction  with placement of hardware with Dr. Al.  - 3/16/22 Pathology revealed 4 LNs (with metastatic adenosquamous carcinoma) and 1 with thyroid carcinoma (subcapsular micrometastasis), level Right 1B, bilateral level 2/3 lymph nodes, with PAOLA.  Patient will need US thyroid in future and potential total thyroidectomy.  - 3/22/22 Surgery:  Right Neck Exploration, r ight scapular tip free flap; m axilla reconstruction with placement of hardware with Dr. Al   - 3/25/22 Tumor Board: Recently found to have right maxilla mass underwent surgical resection, findings c/w adeno-squamous carcinoma, also found to have  metastatic thyroid carcinoma in left level 2/3 lymph node. Rec: Adjuvant chemoradiation; total thyroidectomy  - 5/11/ - 6/22: Completed concurrent chemoXRT w/ 7 weeks of Carboplatin (2mg/AUC/dose) + Paclitaxel (45mg/m2) and 66gy VMAT in 30 fx.   - 3/22/23: Right Mandibular Debridement w/ Dr. Delgado.  - 7/10/23 FNA of a left cervical LN. Path showed polymorphous lymphoid tissue with no carcinoma identified.       Admission:  - 6/23 - 6/28/23: Admitted for Acute Otitis externa and febrile neutropenia. Received broad spectrum IV antibiotics. 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    Past Medical History:   Past Medical History:  No date: Anxiety  No date: CKD (chronic kidney disease)      Comment:  Stage 3  No date: Depression  No date: Dysphagia  09/25/2023: Encounter for electrocardiogram       Comment:  Normal sinus rhythm Normal ECG  No date: GERD (gastroesophageal reflux disease)  No date: History of sinus cancer      Comment:  Right maxillary sinus  squamous cell carcinoma s/p                concurrent chemoXRT  : Hx of tracheostomy  No date: Hyperaldosteronism  No date: Hyperlipidemia  No date: Hypertension  No date: Hypothyroidism  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  2. Normal global left ventricular systolic function.  3. Moderate to severe LVH and hypertensive response to exercise without any regional wall motion abnormalities at 61% MPHR.     SECTION, LOW TRANSVERSE      COLONOSCOPY      CT ANGIO AORTA AND BILATERAL ILIOFEMORAL RUN OFF INCLUDING WITHOUT CONTRAST IF PERFORMED  2023    CT AORTA AND BILATERAL ILIOFEMORAL RUNOFF ANGIOGRAM W AND/OR WO IV CONTRAST 2023 [facility] CT    ECHOCARDIOGRAM 2 D M MODE PANEL  2016    The left ventricular systolic function is normal with a 60-65% ejection fraction.  Spectral Doppler shows an impaired relaxation pattern of left ventricular diastolic filling. There is moderate to severe concentric left ventricular hypertrophy.    IR INTERVENTION VENOUS SAMPLING  2021    IR INTERVENTION VENOUS SAMPLING 2021 [facility]    IR INTERVENTION VENOUS SAMPLING  2021    IR INTERVENTION VENOUS SAMPLING 2021 [facility]    OTHER SURGICAL HISTORY      right maxillectomy scapula free flap    OTHER SURGICAL HISTORY      right mandibular debridement      Family History:    Family History   Problem Relation Name Age of  Onset    Hypertension Mother      Other (CABG) Mother      Hypertension Father      Lung cancer Father      Colon cancer Other grandfather      Family Oncology History:    Cancer-related family history includes Colon cancer in an other family member; Lung cancer in her father.    Social History:    Social History     Tobacco Use    Smoking status: Every Day     Current packs/day: 0.25     Average packs/day: 0.3 packs/day for 40.3 years (10.1 ttl pk-yrs)     Types: Cigarettes     Start date: 1/1/1985     Passive exposure: Never    Smokeless tobacco: Never   Vaping Use    Vaping status: Never Used   Substance Use Topics    Alcohol use: Yes     Comment: OCCASIONALLY    Drug use: Yes     Types: Marijuana     Comment: ADMITS TO GUMMIES      Subjective   Interval History  Patient presents for 2 year 10 months follow up and reports the following:    Patient reports she has had a cold with congestion and productive cough for 1 week. Her boyfriend has the same symptoms.   She had right lower eyelid repair on 9/24/24. She still has epiphora from the right eye and now the right lower lid is dropping again.   She's doing swallowing exercises but still has dysphagia, and feels foods are stuck more often and she's coughing more often after eating/drinking. She also has trismus and facial asymmetry. She's not eating much by mouth by choice, stating concerns for food safety. She's using her PEG for most of her nutrition.  She has significant lymphedema and scar tissue in the submental/neck area. She was previously referred to lymphedema therapy, but has not started.  She has acid reflux which she take Omeprazole PRN  She has been compliant with Levothyroxine 137mcg QAM. Energy improved since starting levothyroxine consistently.  She also c/o pain in her trapezius muscles, L>R, and has a constant ache in her shoulders. Tylenol is not helping with this pain.     ROS  Review of Systems   Constitutional:  Negative for chills and fever.    HENT:   Positive for hearing loss and trouble swallowing. Negative for lump/mass, mouth sores, nosebleeds and sore throat.    Respiratory:  Negative for cough and shortness of breath.    Cardiovascular:  Negative for chest pain and leg swelling.   Gastrointestinal:  Negative for abdominal pain, constipation, diarrhea, nausea and vomiting.   Musculoskeletal:  Positive for arthralgias (shoulder pain).   Skin:  Negative for rash.   Neurological:  Positive for numbness (Numbness in right cheek). Negative for headaches and light-headedness.     Allergies  No Known Allergies     Medications  Current Outpatient Medications   Medication Instructions    amLODIPine (NORVASC) 10 mg, g-tube, Daily    chlorhexidine (Peridex) 0.12 % solution Take 15 mL by mouth 2 (two) times daily    cyclobenzaprine (FLEXERIL) 5 mg, oral, 3 times daily    FLUoxetine (PROZAC) 20 mg, g-tube, Daily    hydrALAZINE (Apresoline) 50 mg tablet 1 tablet, 3 times daily (0900,1400,1900)    hydroCHLOROthiazide (HYDRODIURIL) 25 mg, Daily RT    levothyroxine (SYNTHROID, LEVOXYL) 137 mcg, g-tube, Daily before breakfast    lisinopril 40 mg, g-tube, Daily    lubricating eye drops ophthalmic solution 1 drop, Right Eye, 4 times daily    metFORMIN XR (GLUCOPHAGE-XR) 500 mg, oral, Daily with evening meal    metoprolol succinate XL (TOPROL-XL) 50 mg, oral, Daily, Do not crush or chew. HOLD UNTIL CLEARED FOR ORAL DIET    metoprolol tartrate (LOPRESSOR) 25 mg, oral, Daily    nicotine (NICODERM CQ) 2 mg    omeprazole (PRILOSEC) 40 mg, oral, Daily    pravastatin (Pravachol) 40 mg tablet oral, Daily RT    rosuvastatin (CRESTOR) 10 mg, g-tube, Daily    spironolactone (ALDACTONE) 25 mg, g-tube, Once, 1 tablet DAILY (route: oral)    terazosin (HYTRIN) 5 mg, oral, Nightly    vitamin E mixed 100 unit tablet Daily RT     OBJECTIVE:  VS:  /78   Pulse 76   Temp 36.2 °C (97.2 °F) (Core)   Resp 20   Wt 85.3 kg (188 lb 0.8 oz)   LMP  (LMP Unknown)   SpO2 96%   BMI  32.28 kg/m²   Daily Weight  04/23/25 : 85.3 kg (188 lb 0.8 oz)  01/16/25 : 80.7 kg (178 lb)  01/11/25 : 79.4 kg (175 lb)  12/20/24 : 77.1 kg (170 lb)  09/24/24 : 79.3 kg (174 lb 13.2 oz)  09/09/24 : 77.6 kg (171 lb 1.2 oz)  08/28/24 : 77.6 kg (171 lb)    Physical Exam  Constitutional:       Appearance: Normal appearance. She is well-developed.   HENT:      Head: Normocephalic and atraumatic.      Comments: Post treatment changes in right side of face  Facial asymmetry       Right Ear: External ear normal. No tenderness.      Left Ear: External ear normal. No tenderness.      Nose: Nose normal.      Mouth/Throat:      Mouth: Mucous membranes are moist. No injury or oral lesions.      Tongue: No lesions.      Pharynx: Oropharynx is clear.      Comments: Edentulous, trismus with limited oral opening  Eyes:      Extraocular Movements: Extraocular movements intact.      Conjunctiva/sclera: Conjunctivae normal.      Pupils: Pupils are equal, round, and reactive to light.      Comments: Ectropion in right eye   Neck:      Thyroid: No thyroid mass.      Comments: Neck stiffness R>L, significant submental/neck lymphedema.   Cardiovascular:      Rate and Rhythm: Normal rate and regular rhythm.   Pulmonary:      Effort: Pulmonary effort is normal. No respiratory distress.      Breath sounds: Normal breath sounds.   Abdominal:      General: Bowel sounds are normal. There is no distension or abdominal bruit.      Palpations: Abdomen is soft. There is no mass.      Tenderness: There is no abdominal tenderness.   Musculoskeletal:         General: Normal range of motion.      Cervical back: Full passive range of motion without pain, normal range of motion and neck supple. No signs of trauma. Normal range of motion.      Right lower leg: No edema.      Left lower leg: No edema.   Lymphadenopathy:      Cervical: No cervical adenopathy.      Upper Body:      Right upper body: No axillary adenopathy.      Left upper body: No axillary  adenopathy.   Skin:     General: Skin is warm and dry.      Findings: No lesion or rash.   Neurological:      General: No focal deficit present.      Mental Status: She is alert and oriented to person, place, and time.      Gait: Gait is intact.   Psychiatric:         Mood and Affect: Mood and affect normal.         Behavior: Behavior is cooperative.       Diagnostic Results     Labs  Results from last 7 days   Lab Units 04/23/25  1235   WBC AUTO x10*3/uL 6.2   HEMOGLOBIN g/dL 12.7   HEMATOCRIT % 41.0   PLATELETS AUTO x10*3/uL 180   NEUTROS ABS x10*3/uL 3.44   LYMPHS ABS AUTO x10*3/uL 2.19   MONOS ABS AUTO x10*3/uL 0.41   EOS ABS AUTO x10*3/uL 0.14   NEUTROS PCT AUTO % 55.3   LYMPHS PCT AUTO % 35.3   MONOS PCT AUTO % 6.6   EOS PCT AUTO % 2.3     Results from last 7 days   Lab Units 04/23/25  1235   GLUCOSE mg/dL 147*   SODIUM mmol/L 140   POTASSIUM mmol/L 4.0   CHLORIDE mmol/L 105   CO2 mmol/L 27   BUN mg/dL 28*   CREATININE mg/dL 1.14*   EGFR mL/min/1.73m*2 57*   CALCIUM mg/dL 10.0   ALBUMIN g/dL 4.3   PROTEIN TOTAL g/dL 7.3   BILIRUBIN TOTAL mg/dL 0.3   ALK PHOS U/L 47   ALT U/L 9   AST U/L 10                        Images  01/31/25 CT FACIAL BONES WO IV CONTRAST  - Impression -  Similar postoperative changes compared to the prior exam. The hardware appears intact without significant perihardware lucency, however question if the 2nd screw from the left minimally projects out from the plate compared to the prior exam.  Ongoing but slightly decreased lucency and irregularity involving the anterior mandible bilaterally compared to the prior exam 01/30/2024.    Assessment/Plan     ASSESSMENT  Pamella Rose is a 54 y.o. female with h/o tight maxillary sinus squamous cell carcinoma. [id number].  S/p right hemimaxillectomy, left scapula flap with revision and right scapula tip free flap, bilateral ND. From 5/11/ - 6/22/22 she completed concurrent chemoXRT w/ 7 weeks of Carboplatin (2mg/AUC/dose) + Paclitaxel (45mg/m2)  and 66gy VMAT  in 30 fx.      # Right maxillary sinus squamous cell carcinoma  - 3/16/22 Right hemimaxillectomy, left scapula flap with revision and right scapula tip free flap, b/l ND with negative margins.   - Since surgery she has pain in right side of face, tingling and swelling in right cheek, swelling has improved this week. Following Supp Onc (Sarah NETTLES) for pain management.  - 6/22/22 Completed concurrent chemoXRT w/ 7 weeks of Carboplatin (2mg/AUC/dose) + Paclitaxel (45mg/m2) and 66gy VMAT in 30 fx  - Unfortunately was hospitalized 6/26 -6/28 for febrile neutropenia and otitis externa, d/c home on oral abx, and eye and ear drops.   - 11/18/22: post treatment PET/CT showed good response, increased metabolic activity along the right maxillary bone graft, likely reactive. Patient was seen by Dr. Delgado in [place], per  his exam, she has MAYELA.  - 3/22/23: s/p right mandibular debridement  - 4/26/23 MRI Orbits showed post op changes w/ interval decrease in the enhancement in the surgical bed and surround soft tissues.   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  - 7/10/23 FNA of left parotic nodule showed polymorphous lymphoid tissue w/ no carcinoma identified.   - 2/9/24: Patient admitted for osteoradionecrosis a few months ago. She did not continue HPO therapy.   - 2/22/24 Mandibular debridement and reconstruction with Dr. Delgado on. She has swelling, pain and neuropathy in the right mandible. She does have muscle tightness in the right neck with spasms.   - 8/28/24: Patient feeling at her baseline. Was not able to have ectropion repair on 8/5 due to uncontrolled hypothyroidism. Still has pain/stiffness in right face. Trouble swallowing. Chronic SE are stable.   - 9/24/24 s/p right lower eyelid ectropion repair  - 1/31/25 CT Facial Bones w/o contrast Similar postoperative changes compared to the prior exam. The hardware appears intact without significant  "perihardware lucency, however question if the 2nd screw from the left minimally projects out from the plate compared to the prior exam.  Ongoing but slightly decreased lucency and irregularity involving the  anterior mandible bilaterally compared to the prior exam 01/30/2024.  - 4/23/25: She still has mild ectropion in the right lower lid with some ephiphora. Has facial asymmetry, stable trismus, has significant submental and neck lymphedema and stiffness. She has an URI with a productive cough, O2 sat is 96% today.     # Shoulder pain  - Patient with b/l trapeziums pain, shoulder muscles are stiff. Has not responded to Tylenol, will try Flexeril 5mg TID.     # Dysphagia  - Reports chronic dysphagia that has worsened with feeling of food getting stuck in throat more frequently and increased coughing after eating/drinking. Will refer back to SLP as patient has not followed with SLP since May 2024. Patient is mostly reliant on TF for nutrition.     # GERD  - Acid reflux, controlling with Omeprazole 40mg PRN.     # Trismus  - Pt with trismus and jaw fatigue, stable in the last year or so.     # Thyroid carcinoma   - 3/16/22 Pathology showing 1 left level 2/3 LN with thyroid carcinoma (subcapsular micrometastasis)  - 6/24/22 CT Neck showed Similar appearance of the thyroid gland with sub-centimeter hypodensities in the right greater than left lobe.  - 11/18/22 restaging scan did not find anything remarkable in the thyroid  - Dr. Delgado is following this, per his 6/16 FUV \"since this was an incidental micromets, and there are no significant nodules after chemoradiation, would just observe with serial ultrasounds at this time; pt has significantly scarred neck and likely no residual disease in the thyroid\"    # Hypothroidism  - 12/1/22: TSH 17.20  - 2/17/23: started synthroid 50mcg on 2/17/23 based on 12/1/22 TSH level.  - 4/13/23 TSH 14.91  - 6/22/23 TSH 29.7  - 7/20/23 TSH 32.9. Levothyroxine increased to 88mcg. Recheck " TSH/T4 on 8/25  - 10/5/23: Patient reports she has not been taking levothyroxine. Patient did not go to lab today to have TSH redrawn. Levothyroxine 100mg was sent to [facility] pharmacy for patient to  today.   - 2/8/24: TSH 15.32, T4 1.01. Patient to increase Levothyroxine to 112mcg.   - 4/16/24: TSH 36.12, T4 1.01. Will increase to 137mcg.   - 8/28/24: TSH 1.85. Will continue levothyroxine 137mcg  - 1/16/25: TSH 6.73, T4 1.38  - 4/23/25: TSH pending    # Right eye ptosis/epiphora/possible conjunctivitis  - Right eye ptosis and epiphora, likely related to prior surgery/radiation. No vision change/eye pain  - redness/itchiness in right eye for a while. continue treat her for potential conjunctivitis with Floxin otic drop  - S/p Right ectropion repair on 9/24/24. Patient still has mild ectropion 4 months after surgery with some epiphora.      # Lymphedema  - Right side of face and neck. Swelling fluctuates. Start twice a week lymphedema therapy on 10/31/22. Has had good improvement on the swelling though still has lymphedema at baseline.  - 1/16/25: Significant lymphedema in the submental and neck region, neck muscles very stiff. Will refer back to lymphedema therapy     # Right ear hearing loss  - Patient c/o subjective hearing loss in right ear, ear still feels full, sounds muffled 3 months after treatment  - Was referred to Audiology and had audiogram scheduled 11/11/22, however patient missed this appointment.   - 10/5/23: Patient again endorsed worsening hearing. Referral made again for Audiology and patient is agreeable.       # CKD, stage 3  - Following with Dr. Ana Lee  - Today Cr 1.23, GFR 52, around her baseline. Encourage patient to keep up with hydration via PEG and PO.     # Depression  - Following Sushil Bryan ([facility]), was started on Fluoxetine 20mg and Olanzapine 2.5mg     # Hypertension  - 148/97, HR 73. Patient has PCP at [facility], reports she's on Lisinopril, Amlodipine,  Spironolactone, Metoprolol, and is on a statin. She reports missing some medications here and there. Advised patient to taking her BP meds daily.   - BP wnl in clinic today  - 8/28: Patient requested refill of Lisinopril 40mg, Metoprolol 25mg every day for her HTN. She will follow up with her PCP at [facility] for future refills and management.     PLAN:  --   -- RTC 3 months on 4/23/25 w/ MedOnc, labs cbc, cmp, tsh/t4  -- 5/9/25 NPV w/ Dr. Heart (taking over for Dr. Delgado)  -- Continue Levothyroxine 137mcg pending TSH wnl  -- Continue Omeprazole to 40mg QD PRN for GERD  -- Flexeril 5mg TID for shoulder stiffness, script sent to pharmacy.  -- Referral to Lymphedema therapy for neck swelling  -- Referral to SLP for worsening dysphagia

## 2025-04-01 ENCOUNTER — PHARMACY VISIT (OUTPATIENT)
Dept: PHARMACY | Facility: CLINIC | Age: 55
End: 2025-04-01
Payer: MEDICAID

## 2025-04-22 ENCOUNTER — SOCIAL WORK (OUTPATIENT)
Dept: CASE MANAGEMENT | Facility: HOSPITAL | Age: 55
End: 2025-04-22
Payer: COMMERCIAL

## 2025-04-22 ENCOUNTER — TELEPHONE (OUTPATIENT)
Dept: ADMISSION | Facility: HOSPITAL | Age: 55
End: 2025-04-22
Payer: COMMERCIAL

## 2025-04-22 NOTE — TELEPHONE ENCOUNTER
Pt has follow up tomorrow with Vicky Boles PA-C.  She states she has been unable to reach [employer] and asks for assistance arranging transportation to her 1pm appt?

## 2025-04-22 NOTE — PROGRESS NOTES
"Transportation Referral     Ambulation: Independently  Pick-up Address: [address] [place] [address]  Patient Phone: [phone]  Accompaniment: Scheduled one in event that Pamella would like someone to attend.   Phone Receives Text Messages? Yes  Transportation Service: [employer] (p\" [phone])      Scheduled Ride(s):     Date:  4/23/2025  Appointment:  Oncology   Drop off Address: [facility]: [address] [place] [address]   Time:  11:45-12:15, likely around 12:00  Return Ride:  Will call   Confirmation: [id number]        WILMER made call to Pamella, introduced self and role. Shared details of trip, she is agreeable. Pamella also wrote down WILMER name and number, encouraged her to call if there are any concerns or issues. WILMER will continue to follow.  "

## 2025-04-23 ENCOUNTER — OFFICE VISIT (OUTPATIENT)
Dept: HEMATOLOGY/ONCOLOGY | Facility: HOSPITAL | Age: 55
End: 2025-04-23
Payer: COMMERCIAL

## 2025-04-23 ENCOUNTER — HOSPITAL ENCOUNTER (OUTPATIENT)
Dept: RADIOLOGY | Facility: HOSPITAL | Age: 55
Discharge: HOME | End: 2025-04-23
Payer: COMMERCIAL

## 2025-04-23 ENCOUNTER — LAB (OUTPATIENT)
Dept: LAB | Facility: HOSPITAL | Age: 55
End: 2025-04-23
Payer: COMMERCIAL

## 2025-04-23 VITALS
DIASTOLIC BLOOD PRESSURE: 78 MMHG | WEIGHT: 188.05 LBS | RESPIRATION RATE: 20 BRPM | SYSTOLIC BLOOD PRESSURE: 132 MMHG | HEART RATE: 76 BPM | BODY MASS INDEX: 32.28 KG/M2 | TEMPERATURE: 97.2 F | OXYGEN SATURATION: 96 %

## 2025-04-23 DIAGNOSIS — C41.1 CANCER OF INFERIOR MAXILLA (MULTI): Primary | ICD-10-CM

## 2025-04-23 DIAGNOSIS — H02.102 ECTROPION OF RIGHT LOWER EYELID, UNSPECIFIED ECTROPION TYPE: ICD-10-CM
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
## 2025-04-23 DIAGNOSIS — I89.0 LYMPHEDEMA DUE TO AND NOT CONCURRENT WITH IONIZING RADIOTHERAPY: ICD-10-CM

## 2025-04-23 DIAGNOSIS — Z85.89 ENCOUNTER FOR FOLLOW-UP SURVEILLANCE OF HEAD AND NECK CANCER: ICD-10-CM

## 2025-04-23 DIAGNOSIS — M62.89 MUSCLE STIFFNESS: ICD-10-CM

## 2025-04-23 DIAGNOSIS — R25.2 TRISMUS: ICD-10-CM

## 2025-04-23 DIAGNOSIS — R05.1 ACUTE COUGH: ICD-10-CM

## 2025-04-23 DIAGNOSIS — C41.1 CANCER OF INFERIOR MAXILLA (MULTI): ICD-10-CM

## 2025-04-23 DIAGNOSIS — Z08 ENCOUNTER FOR FOLLOW-UP SURVEILLANCE OF HEAD AND NECK CANCER: ICD-10-CM

## 2025-04-23 DIAGNOSIS — R13.10 DYSPHAGIA, UNSPECIFIED TYPE: ICD-10-CM

## 2025-04-23 DIAGNOSIS — J06.9 VIRAL UPPER RESPIRATORY TRACT INFECTION: ICD-10-CM

## 2025-04-23 DIAGNOSIS — M62.838 MUSCLE SPASMS OF NECK: ICD-10-CM

## 2025-04-23 DIAGNOSIS — K21.9 GASTROESOPHAGEAL REFLUX DISEASE, UNSPECIFIED WHETHER ESOPHAGITIS PRESENT: ICD-10-CM

## 2025-04-23 LAB
ALBUMIN SERPL BCP-MCNC: 4.3 G/DL (ref 3.4–5)
ALP SERPL-CCNC: 47 U/L (ref 33–110)
ALT SERPL W P-5'-P-CCNC: 9 U/L (ref 7–45)
ANION GAP SERPL CALC-SCNC: 12 MMOL/L (ref 10–20)
AST SERPL W P-5'-P-CCNC: 10 U/L (ref 9–39)
BASOPHILS # BLD AUTO: 0.02 X10*3/UL (ref 0–0.1)
BASOPHILS NFR BLD AUTO: 0.3 %
BILIRUB SERPL-MCNC: 0.3 MG/DL (ref 0–1.2)
BUN SERPL-MCNC: 28 MG/DL (ref 6–23)
CALCIUM SERPL-MCNC: 10 MG/DL (ref 8.6–10.3)
CHLORIDE SERPL-SCNC: 105 MMOL/L (ref 98–107)
CO2 SERPL-SCNC: 27 MMOL/L (ref 21–32)
CREAT SERPL-MCNC: 1.14 MG/DL (ref 0.5–1.05)
EGFRCR SERPLBLD CKD-EPI 2021: 57 ML/MIN/1.73M*2
EOSINOPHIL # BLD AUTO: 0.14 X10*3/UL (ref 0–0.7)
EOSINOPHIL NFR BLD AUTO: 2.3 %
ERYTHROCYTE [DISTWIDTH] IN BLOOD BY AUTOMATED COUNT: 15.5 % (ref 11.5–14.5)
GLUCOSE SERPL-MCNC: 147 MG/DL (ref 74–99)
HCT VFR BLD AUTO: 41 % (ref 36–46)
HGB BLD-MCNC: 12.7 G/DL (ref 12–16)
IMM GRANULOCYTES # BLD AUTO: 0.01 X10*3/UL (ref 0–0.7)
IMM GRANULOCYTES NFR BLD AUTO: 0.2 % (ref 0–0.9)
LYMPHOCYTES # BLD AUTO: 2.19 X10*3/UL (ref 1.2–4.8)
LYMPHOCYTES NFR BLD AUTO: 35.3 %
MCH RBC QN AUTO: 28.6 PG (ref 26–34)
MCHC RBC AUTO-ENTMCNC: 31 G/DL (ref 32–36)
MCV RBC AUTO: 92 FL (ref 80–100)
MONOCYTES # BLD AUTO: 0.41 X10*3/UL (ref 0.1–1)
MONOCYTES NFR BLD AUTO: 6.6 %
NEUTROPHILS # BLD AUTO: 3.44 X10*3/UL (ref 1.2–7.7)
NEUTROPHILS NFR BLD AUTO: 55.3 %
NRBC BLD-RTO: 0 /100 WBCS (ref 0–0)
PLATELET # BLD AUTO: 180 X10*3/UL (ref 150–450)
POTASSIUM SERPL-SCNC: 4 MMOL/L (ref 3.5–5.3)
PROT SERPL-MCNC: 7.3 G/DL (ref 6.4–8.2)
RBC # BLD AUTO: 4.44 X10*6/UL (ref 4–5.2)
SODIUM SERPL-SCNC: 140 MMOL/L (ref 136–145)
T4 FREE SERPL-MCNC: 1.35 NG/DL (ref 0.78–1.48)
TSH SERPL-ACNC: 5.69 MIU/L (ref 0.44–3.98)
WBC # BLD AUTO: 6.2 X10*3/UL (ref 4.4–11.3)

## 2025-04-23 PROCEDURE — 85025 COMPLETE CBC W/AUTO DIFF WBC: CPT

## 2025-04-23 PROCEDURE — 80053 COMPREHEN METABOLIC PANEL: CPT

## 2025-04-23 PROCEDURE — 71046 X-RAY EXAM CHEST 2 VIEWS: CPT

## 2025-04-23 PROCEDURE — 36415 COLL VENOUS BLD VENIPUNCTURE: CPT

## 2025-04-23 PROCEDURE — 84443 ASSAY THYROID STIM HORMONE: CPT

## 2025-04-23 PROCEDURE — 84439 ASSAY OF FREE THYROXINE: CPT

## 2025-04-23 PROCEDURE — RXMED WILLOW AMBULATORY MEDICATION CHARGE

## 2025-04-23 PROCEDURE — 99215 OFFICE O/P EST HI 40 MIN: CPT | Performed by: STUDENT IN AN ORGANIZED HEALTH CARE EDUCATION/TRAINING PROGRAM

## 2025-04-23 RX ORDER — CYCLOBENZAPRINE HCL 5 MG
5 TABLET ORAL 3 TIMES DAILY
Qty: 30 TABLET | Refills: 2 | Status: SHIPPED | OUTPATIENT
Start: 2025-04-23 | End: 2025-04-23

## 2025-04-23 RX ORDER — CYCLOBENZAPRINE HCL 5 MG
5 TABLET ORAL 3 TIMES DAILY
Qty: 30 TABLET | Refills: 2 | Status: SHIPPED | OUTPATIENT
Start: 2025-04-23

## 2025-04-23 ASSESSMENT — PAIN SCALES - GENERAL: PAINLEVEL_OUTOF10: 0-NO PAIN

## 2025-04-23 NOTE — PROGRESS NOTES
Patient is here for a FUV with Vicky Boles PA-C. Patient states she is okay, she has been having pain in both her arms, her PCP gave her tylenol but that has not helped. She also stated she has a chest cold she's been getting over. Vicky valle.

## 2025-04-24 ENCOUNTER — PHARMACY VISIT (OUTPATIENT)
Dept: PHARMACY | Facility: CLINIC | Age: 55
End: 2025-04-24
Payer: MEDICAID

## 2025-05-09 ENCOUNTER — OFFICE VISIT (OUTPATIENT)
Dept: OTOLARYNGOLOGY | Facility: HOSPITAL | Age: 55
End: 2025-05-09
Payer: COMMERCIAL

## 2025-05-09 ENCOUNTER — PHARMACY VISIT (OUTPATIENT)
Dept: PHARMACY | Facility: CLINIC | Age: 55
End: 2025-05-09
Payer: MEDICAID

## 2025-05-09 VITALS — BODY MASS INDEX: 31.92 KG/M2 | HEIGHT: 64 IN | TEMPERATURE: 97.3 F | WEIGHT: 187 LBS

## 2025-05-09 DIAGNOSIS — Z85.89 PERSONAL HISTORY OF MALIGNANT NEOPLASM OF HEAD AND NECK: Primary | ICD-10-CM

## 2025-05-09 DIAGNOSIS — H91.90 HEARING LOSS, UNSPECIFIED HEARING LOSS TYPE, UNSPECIFIED LATERALITY: ICD-10-CM

## 2025-05-09 DIAGNOSIS — E03.9 ACQUIRED HYPOTHYROIDISM: ICD-10-CM

## 2025-05-09 DIAGNOSIS — G89.29 OTHER CHRONIC PAIN: ICD-10-CM

## 2025-05-09 DIAGNOSIS — H02.533 LID RETRACTION OF RIGHT EYE: ICD-10-CM

## 2025-05-09 DIAGNOSIS — R13.10 DYSPHAGIA, UNSPECIFIED TYPE: ICD-10-CM

## 2025-05-09 PROCEDURE — RXMED WILLOW AMBULATORY MEDICATION CHARGE

## 2025-05-09 PROCEDURE — 3008F BODY MASS INDEX DOCD: CPT | Performed by: STUDENT IN AN ORGANIZED HEALTH CARE EDUCATION/TRAINING PROGRAM

## 2025-05-09 PROCEDURE — 99214 OFFICE O/P EST MOD 30 MIN: CPT | Performed by: STUDENT IN AN ORGANIZED HEALTH CARE EDUCATION/TRAINING PROGRAM

## 2025-05-09 ASSESSMENT — PAIN SCALES - GENERAL: PAINLEVEL_OUTOF10: 8

## 2025-05-09 ASSESSMENT — PATIENT HEALTH QUESTIONNAIRE - PHQ9
2. FEELING DOWN, DEPRESSED OR HOPELESS: NOT AT ALL
1. LITTLE INTEREST OR PLEASURE IN DOING THINGS: NOT AT ALL
SUM OF ALL RESPONSES TO PHQ9 QUESTIONS 1 & 2: 0

## 2025-05-09 NOTE — LETTER
May 9, 2025     JEAN PIERRE Lombardi, DNP  [address]  [facility] [address]    Patient: Pamella Rose   YOB: 1970   Date of Visit: 2025       Dear Dr. Mayte Vela, APRN-CNP, DNP:    Thank you for referring Pamella Rose to me for evaluation. Below are my notes for this consultation.  If you have questions, please do not hesitate to call me. I look forward to following your patient along with you.       Sincerely,     Sharita Heart MD      CC: No Recipients  ______________________________________________________________________________________    ENT Subsequent Encounter Clinic Note  Name: Pamella Rose  MRN: [id number]  : 1970  CC: surveillance right maxillary cancer    Interval History  Pamella Rose is a 53 y.o. female presents for follow up.  She had a [id number] right maxillary cancer s/p resection with scapula free flap 3/16/22 with Dr Delgado and Dr Al followed by adjuvant radiation. Underwent revision scapula due to flap loss. She subsequently had ORN, did HBO. Debridement of the mandible on 24. She was last seen by Dr Delgado on 25. At that time she had an area of plate exposure along the upper gingiva.     Today 25 she returns for follow up. She has multiple complaints today including concerns about being overweight, hearing loss and generalized pain. She is taking tylenol for pain. She eats some things by mouth, mostly g tube dependent. She had a CT scan from 2025 which was stable. She has not had a recent hearing test. She takes synthroid daily but not consistently in the morning, takes it with the rest of her meds before dinner    Review of Systems  A complete 14 point review of systems was obtained and was negative other than what is stated in the history of present illness.    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  2. Normal global left ventricular systolic function.  3. Moderate to severe LVH and hypertensive response to exercise without any regional wall motion abnormalities at 61% MPHR.   •  SECTION, LOW TRANSVERSE     • COLONOSCOPY     • CT ANGIO AORTA AND BILATERAL ILIOFEMORAL RUN OFF INCLUDING WITHOUT CONTRAST IF PERFORMED  2023    CT AORTA AND BILATERAL ILIOFEMORAL RUNOFF ANGIOGRAM W AND/OR WO IV CONTRAST 2023 [facility] [place]   • ECHOCARDIOGRAM 2 D M MODE PANEL  2016    The left ventricular systolic function is normal with a 60-65% ejection fraction.  Spectral Doppler shows an impaired relaxation pattern of left ventricular diastolic filling. There is moderate to severe concentric left ventricular hypertrophy.   • IR INTERVENTION VENOUS SAMPLING  2021    IR INTERVENTION VENOUS SAMPLING 2021 [facility] [place] [facility]   • IR INTERVENTION VENOUS SAMPLING  2021    IR INTERVENTION VENOUS SAMPLING 2021 [facility] [place] [facility]   • OTHER SURGICAL HISTORY      right maxillectomy scapula free flap   • OTHER SURGICAL HISTORY      right mandibular debridement       Medications  Current Outpatient Medications on File Prior to Visit   Medication Sig Dispense Refill   • amLODIPine (Norvasc) 10 mg  tablet 1 tablet (10 mg) by g-tube route once daily.     • chlorhexidine (Peridex) 0.12 % solution Take 15 mL by mouth 2 (two) times daily 1419 mL 5   • cyclobenzaprine (Flexeril) 5 mg tablet Take 1 tablet (5 mg) by mouth 3 times a day. 30 tablet 2   • FLUoxetine (PROzac) 20 mg tablet 1 tablet (20 mg) by g-tube route once daily.     • hydrALAZINE (Apresoline) 50 mg tablet Take 1 tablet (50 mg) by mouth 3 times a day.     • hydroCHLOROthiazide (HYDRODiuril) 25 mg tablet Take 1 tablet (25 mg) by mouth once daily.     • levothyroxine (Synthroid, Levoxyl) 137 mcg tablet 1 tablet (137 mcg) by g-tube route once daily in the morning. Take before meals. 30 tablet 11   • lisinopril 40 mg tablet 1 tablet (40 mg) by g-tube route once daily. 30 tablet 0   • lubricating eye drops ophthalmic solution Administer 1 drop into the right eye 4 times a day. (Patient not taking: Reported on 12/20/2024) 60 each 1   • metFORMIN  mg 24 hr tablet Take 1 tablet (500 mg) by mouth once daily in the evening. Take with meals.     • metoprolol succinate XL (Toprol-XL) 50 mg 24 hr tablet Take 1 tablet (50 mg) by mouth once daily. Do not crush or chew. HOLD UNTIL CLEARED FOR ORAL DIET     • metoprolol tartrate (Lopressor) 25 mg tablet Take 1 tablet (25 mg) by mouth once daily. 30 tablet 0   • nicotine (Nicoderm CQ) 7 mg/24 hr patch Place 2 mg on the skin.     • omeprazole (PriLOSEC) 40 mg DR capsule Take 1 capsule (40 mg) by mouth once daily. 90 capsule 2   • pravastatin (Pravachol) 40 mg tablet Take by mouth once daily.     • rosuvastatin (Crestor) 10 mg tablet 1 tablet (10 mg) by g-tube route once daily.     • spironolactone (Aldactone) 25 mg tablet 1 tablet (25 mg) by g-tube route 1 time for 1 dose. 1 tablet DAILY (route: oral)     • terazosin (Hytrin) 5 mg capsule Take 1 capsule (5 mg) by mouth once daily at bedtime.     • vitamin E mixed 100 unit tablet Take by mouth once daily.       No current facility-administered medications on file  prior to visit.       Allergies  No Known Allergies    Social History  Social History     Tobacco Use   • Smoking status: Every Day     Current packs/day: 0.25     Average packs/day: 0.3 packs/day for 40.4 years (10.1 ttl pk-yrs)     Types: Cigarettes     Start date: 1/1/1985     Passive exposure: Never   • Smokeless tobacco: Never   Vaping Use   • Vaping status: Never Used   Substance Use Topics   • Alcohol use: Yes     Comment: OCCASIONALLY   • Drug use: Yes     Types: Marijuana     Comment: ADMITS TO GUMMIES       Family History  Family History   Problem Relation Name Age of Onset   • Hypertension Mother     • Other (CABG) Mother     • Hypertension Father     • Lung cancer Father     • Colon cancer Other grandfather        Physical Examination  General: awake, alert, well-nourished, no acute distress  Voice: strong, no breathiness or hoarseness. Some dysarthria and hypernasality  Face: symmetric, no lesions  Ears: bilateral EAC clear, some thickened TM likely post radiation, no fluid or infection  Eyes: mild ectropion right eyelid  Nose: no lesions of dorsum, no drainage  Oral cavity: mucous membranes moist, no mass or lesions, small 2 mm fistula posterior palate, small area of plate exposure superior gingiva under lip less than one hole, stable from prior  Oropharynx: symmetric palate elevation, no masses  Neck: significant woody radiation fibrosis and skin changes of the neck, prior incision healing well  Respiratory: no increased work of breathing or stridor    Impression and Plan  Pamella Rose is a 54 y.o. female who presents to ENT clinic today for follow up. History of scapular free flap for R sided maxillary cancer, followed by adjuvant chemoradiation completed 6/22/22 (64 gy and carboplatin + paclitaxel.     History of head and neck cancer  - overall doing well, no evidence of disease recurrence today  - stable small amount of plate exposure and fistula. I worry that further reconstruction to this area  may worsen the problem. No signs of infection today, will continue to monitor closely   - reviewed NCCN guidelines for surveillance, I would like to continue following and will see her in 6 months for repeat exam    Dysphagia  G Tube feeds  - Discussed continued tube feeds and oral intake for nutrition  - she is interested in ozempic for weight loss, no strict contraindication from my standpoint although I would defer to PCP and involvement of nutrition as it may not be in her best interest given her complex history    Hypothyroidism  - TSH mildly elevated. Education provided on taking synthroid consistently daily in the morning prior to any other food intake. She expressed understanding    Subjective hearing loss  - she has not had a hearing test, referral placed today to audiology    Pain  - discussed pain at length, she will continue with intermittent tylenol. If having more difficult to control pain at surgical sites will consider repeat imaging and escalation of pain control    Questions answered and she was in agreement with the plan. Follow up in 6 months  Sharita Heart MD

## 2025-05-09 NOTE — PROGRESS NOTES
ENT Subsequent Encounter Clinic Note  Name: Pamella Rose  MRN: [id number]  : 1970  CC: surveillance right maxillary cancer    Interval History  Pamella Rose is a 53 y.o. female presents for follow up.  She had a [id number] right maxillary cancer s/p resection with scapula free flap 3/16/22 with Dr Delgado and Dr Al followed by adjuvant radiation. Underwent revision scapula due to flap loss. She subsequently had ORN, did HBO. Debridement of the mandible on 24. She was last seen by Dr Delgado on 25. At that time she had an area of plate exposure along the upper gingiva.     Today 25 she returns for follow up. She has multiple complaints today including concerns about being overweight, hearing loss and generalized pain. She is taking tylenol for pain. She eats some things by mouth, mostly g tube dependent. She had a CT scan from 2025 which was stable. She has not had a recent hearing test. She takes synthroid daily but not consistently in the morning, takes it with the rest of her meds before dinner    Review of Systems  A complete 14 point review of systems was obtained and was negative other than what is stated in the history of present illness.    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  2. Normal global left ventricular systolic function.  3. Moderate to severe LVH and hypertensive response to exercise without any regional wall motion abnormalities at 61% MPHR.     SECTION, LOW TRANSVERSE      COLONOSCOPY      CT ANGIO AORTA AND BILATERAL ILIOFEMORAL RUN OFF INCLUDING WITHOUT CONTRAST IF PERFORMED  2023    CT AORTA AND BILATERAL ILIOFEMORAL RUNOFF ANGIOGRAM W AND/OR WO IV CONTRAST 2023 [facility] CT    ECHOCARDIOGRAM 2 D M MODE PANEL  2016    The left ventricular systolic function is normal with a 60-65% ejection fraction.  Spectral Doppler shows an impaired relaxation pattern of left ventricular diastolic filling. There is moderate to severe concentric left ventricular hypertrophy.    IR INTERVENTION VENOUS SAMPLING  2021    IR INTERVENTION VENOUS SAMPLING 2021 [facility] [place] [facility]    IR INTERVENTION VENOUS SAMPLING  2021    IR INTERVENTION VENOUS SAMPLING 2021 [facility] [place] [facility]    OTHER SURGICAL HISTORY      right maxillectomy scapula free flap    OTHER SURGICAL HISTORY      right mandibular debridement       Medications  Current Outpatient Medications on File Prior to Visit   Medication Sig Dispense Refill    amLODIPine (Norvasc) 10 mg tablet 1 tablet (10 mg) by g-tube route once daily.      chlorhexidine (Peridex) 0.12 % solution Take 15 mL by mouth 2 (two) times daily 1419 mL 5    cyclobenzaprine (Flexeril) 5 mg tablet Take 1 tablet (5 mg) by mouth 3 times a day. 30 tablet 2    FLUoxetine (PROzac) 20 mg tablet 1 tablet (20 mg) by g-tube route once daily.      hydrALAZINE (Apresoline) 50 mg tablet Take 1 tablet (50 mg) by mouth 3 times a day.      hydroCHLOROthiazide (HYDRODiuril) 25 mg tablet Take 1 tablet (25 mg) by mouth once daily.      levothyroxine (Synthroid, Levoxyl) 137 mcg tablet 1 tablet (137 mcg) by g-tube route once daily in  the morning. Take before meals. 30 tablet 11    lisinopril 40 mg tablet 1 tablet (40 mg) by g-tube route once daily. 30 tablet 0    lubricating eye drops ophthalmic solution Administer 1 drop into the right eye 4 times a day. (Patient not taking: Reported on 12/20/2024) 60 each 1    metFORMIN  mg 24 hr tablet Take 1 tablet (500 mg) by mouth once daily in the evening. Take with meals.      metoprolol succinate XL (Toprol-XL) 50 mg 24 hr tablet Take 1 tablet (50 mg) by mouth once daily. Do not crush or chew. HOLD UNTIL CLEARED FOR ORAL DIET      metoprolol tartrate (Lopressor) 25 mg tablet Take 1 tablet (25 mg) by mouth once daily. 30 tablet 0    nicotine (Nicoderm CQ) 7 mg/24 hr patch Place 2 mg on the skin.      omeprazole (PriLOSEC) 40 mg DR capsule Take 1 capsule (40 mg) by mouth once daily. 90 capsule 2    pravastatin (Pravachol) 40 mg tablet Take by mouth once daily.      rosuvastatin (Crestor) 10 mg tablet 1 tablet (10 mg) by g-tube route once daily.      spironolactone (Aldactone) 25 mg tablet 1 tablet (25 mg) by g-tube route 1 time for 1 dose. 1 tablet DAILY (route: oral)      terazosin (Hytrin) 5 mg capsule Take 1 capsule (5 mg) by mouth once daily at bedtime.      vitamin E mixed 100 unit tablet Take by mouth once daily.       No current facility-administered medications on file prior to visit.       Allergies  No Known Allergies    Social History  Social History     Tobacco Use    Smoking status: Every Day     Current packs/day: 0.25     Average packs/day: 0.3 packs/day for 40.4 years (10.1 ttl pk-yrs)     Types: Cigarettes     Start date: 1/1/1985     Passive exposure: Never    Smokeless tobacco: Never   Vaping Use    Vaping status: Never Used   Substance Use Topics    Alcohol use: Yes     Comment: OCCASIONALLY    Drug use: Yes     Types: Marijuana     Comment: ADMITS TO GUMMIES       Family History  Family History   Problem Relation Name Age of Onset    Hypertension Mother      Other (CABG) Mother       Hypertension Father      Lung cancer Father      Colon cancer Other grandfather        Physical Examination  General: awake, alert, well-nourished, no acute distress  Voice: strong, no breathiness or hoarseness. Some dysarthria and hypernasality  Face: symmetric, no lesions  Ears: bilateral EAC clear, some thickened TM likely post radiation, no fluid or infection  Eyes: mild ectropion right eyelid  Nose: no lesions of dorsum, no drainage  Oral cavity: mucous membranes moist, no mass or lesions, small 2 mm fistula posterior palate, small area of plate exposure superior gingiva under lip less than one hole, stable from prior  Oropharynx: symmetric palate elevation, no masses  Neck: significant woody radiation fibrosis and skin changes of the neck, prior incision healing well  Respiratory: no increased work of breathing or stridor    Impression and Plan  Pamella Rose is a 54 y.o. female who presents to ENT clinic today for follow up. History of scapular free flap for R sided maxillary cancer, followed by adjuvant chemoradiation completed 6/22/22 (64 gy and carboplatin + paclitaxel.     History of head and neck cancer  - overall doing well, no evidence of disease recurrence today  - stable small amount of plate exposure and fistula. I worry that further reconstruction to this area may worsen the problem. No signs of infection today, will continue to monitor closely   - reviewed NCCN guidelines for surveillance, I would like to continue following and will see her in 6 months for repeat exam    Dysphagia  G Tube feeds  - Discussed continued tube feeds and oral intake for nutrition  - she is interested in ozempic for weight loss, no strict contraindication from my standpoint although I would defer to PCP and involvement of nutrition as it may not be in her best interest given her complex history    Hypothyroidism  - TSH mildly elevated. Education provided on taking synthroid consistently daily in the morning prior to  any other food intake. She expressed understanding    Subjective hearing loss  - she has not had a hearing test, referral placed today to audiology    Pain  - discussed pain at length, she will continue with intermittent tylenol. If having more difficult to control pain at surgical sites will consider repeat imaging and escalation of pain control    Questions answered and she was in agreement with the plan. Follow up in 6 months  Sharita Heart MD

## 2025-05-21 ENCOUNTER — PHARMACY VISIT (OUTPATIENT)
Dept: PHARMACY | Facility: CLINIC | Age: 55
End: 2025-05-21
Payer: MEDICAID

## 2025-05-21 PROCEDURE — RXMED WILLOW AMBULATORY MEDICATION CHARGE

## 2025-06-03 ENCOUNTER — NURSE TRIAGE (OUTPATIENT)
Dept: ADMISSION | Facility: HOSPITAL | Age: 55
End: 2025-06-03
Payer: COMMERCIAL

## 2025-06-03 DIAGNOSIS — K21.9 GASTROESOPHAGEAL REFLUX DISEASE, UNSPECIFIED WHETHER ESOPHAGITIS PRESENT: ICD-10-CM

## 2025-06-03 DIAGNOSIS — C41.1 CANCER OF INFERIOR MAXILLA (MULTI): ICD-10-CM

## 2025-06-03 PROCEDURE — RXMED WILLOW AMBULATORY MEDICATION CHARGE

## 2025-06-03 RX ORDER — OMEPRAZOLE 40 MG/1
40 CAPSULE, DELAYED RELEASE ORAL DAILY
Qty: 90 CAPSULE | Refills: 3 | Status: SHIPPED | OUTPATIENT
Start: 2025-06-03 | End: 2026-05-29

## 2025-06-03 NOTE — TELEPHONE ENCOUNTER
Vicky I talked her. Can you put a referral in for lymphedema? She said she will go. She does want to be established Supp onc. I did tell her to take Tylenol. Thanks!

## 2025-06-03 NOTE — TELEPHONE ENCOUNTER
Pt left a voicemail requesting a refill of omeprazole.  She also asked if there is anything else to do for arm and mouth pain.    Call returned, pt informed that omeprazole was sent in today.       Pt reports ongoing bilateral shoulder/upper arm pain as well as pain in her mouth due to exposed surgical plate in her upper gum area.    She is currently using extra strength tylenol which does not help and flexeril with slight improvement.  Both areas cause constant pain.  She rates her mouth as 6/10 and her shoulders as 8/10.      She remains able to complete ADL's.  No fevers, chest pain or difficulty breathing.

## 2025-06-03 NOTE — TELEPHONE ENCOUNTER
Pt provided with contact number for Dr. Heart so she can reach out regarding mouth pain.  Pt states she would like to try lymphedema therapy and would be interested in re-establishing with supportive oncology.  She denies current use of elicit drugs.

## 2025-06-04 ENCOUNTER — PHARMACY VISIT (OUTPATIENT)
Dept: PHARMACY | Facility: CLINIC | Age: 55
End: 2025-06-04
Payer: MEDICAID

## 2025-06-11 PROCEDURE — RXMED WILLOW AMBULATORY MEDICATION CHARGE

## 2025-06-12 ENCOUNTER — PHARMACY VISIT (OUTPATIENT)
Dept: PHARMACY | Facility: CLINIC | Age: 55
End: 2025-06-12
Payer: MEDICAID

## 2025-07-29 PROCEDURE — RXMED WILLOW AMBULATORY MEDICATION CHARGE

## 2025-07-31 ENCOUNTER — PHARMACY VISIT (OUTPATIENT)
Dept: PHARMACY | Facility: CLINIC | Age: 55
End: 2025-07-31
Payer: MEDICAID

## 2025-08-21 PROCEDURE — RXMED WILLOW AMBULATORY MEDICATION CHARGE

## 2025-08-22 ENCOUNTER — PHARMACY VISIT (OUTPATIENT)
Dept: PHARMACY | Facility: CLINIC | Age: 55
End: 2025-08-22
Payer: MEDICAID

## 2025-08-28 ENCOUNTER — TELEPHONE (OUTPATIENT)
Dept: HEMATOLOGY/ONCOLOGY | Facility: HOSPITAL | Age: 55
End: 2025-08-28
Payer: COMMERCIAL

## (undated) DEVICE — SUTURE, PROLENE, 4-0 VB/RB-1, 36IN, BLUE

## (undated) DEVICE — COVER PROBE, SOFT FLEX W/ GEL, 5 X 48 IN (13X122CM)

## (undated) DEVICE — SUTURE, MONOCRYL, 4-0, 18 IN, PS2, UNDYED

## (undated) DEVICE — CUFF, TOURNIQUET, 24 X 4, DUAL PORT/DUAL BLADDER, YELLOW, STERILE

## (undated) DEVICE — SUPPLEMENTAL MODEL HALF

## (undated) DEVICE — SUTURE, PROLENE, 5-0, 30 IN, RB2, DA, BLUE

## (undated) DEVICE — Device

## (undated) DEVICE — MANIFOLD, 4 PORT NEPTUNE STANDARD

## (undated) DEVICE — SPONGE, LAP, XRAY DECT, 12IN X 12IN, W/MASTER DMT, STERILE

## (undated) DEVICE — CATHETER TRAY, SURESTEP, 16FR, URINE METER W/STATLOCK

## (undated) DEVICE — COVER, CART, 45 X 27 X 48 IN, CLEAR

## (undated) DEVICE — PAD, GROUNDING, ELECTROSURGICAL, W/9 FT CABLE, POLYHESIVE II, ADULT, LF

## (undated) DEVICE — OINTMENT, TOPICAL, BACITRACIN

## (undated) DEVICE — DEPRESSOR, TONGUE, 6 IN, WOOD, STERILE, LF

## (undated) DEVICE — COVER, TABLE, 44 X 75 IN, DISPOSABLE, LF, STERILE

## (undated) DEVICE — NEEDLE, SPINAL, QUINCKE, 18 G X 3.5 IN, PINK HUB

## (undated) DEVICE — DRESSING, GAUZE, 16 PLY, 4 X 4 IN, STERILE

## (undated) DEVICE — CANNULA, ANTERIOR CHAMBER

## (undated) DEVICE — REST, HEAD, BAGEL, 9 IN

## (undated) DEVICE — BOWL, UTILITY, 32 OZ, PLASTIC, STERILE

## (undated) DEVICE — NEEDLE, ELECTRODE, ELECTROSURGICAL, INSULATED

## (undated) DEVICE — SUTURE, PERMA HAND 2-0, TAPER POINT, SH BLACK 8-18 INCH

## (undated) DEVICE — CATHETER, URETHRAL, PEZZER, 3 CM HEAD, 36 FR, LATEX

## (undated) DEVICE — TUBING SET, ASPIRATION

## (undated) DEVICE — SUTURE, SILK, 2-0, 18 IN, BLACK

## (undated) DEVICE — SUTURE, MONOCRYLIC, 5-0, 18 IN, P-3

## (undated) DEVICE — PROTECTOR, CORNEAL, CROUCH

## (undated) DEVICE — NEEDLE, MICRODISSECTION STR 4CM

## (undated) DEVICE — SUTURE, PLAIN, 5-0, 18 IN, PC1, YELLOW

## (undated) DEVICE — DRAIN, WOUND, ROUND, W/TROCAR, HOLE PATTERN, 10 IN, MEDIUM, 1/8 X 49 IN

## (undated) DEVICE — SUPPLEMENTAL MODEL VERIFICATION

## (undated) DEVICE — DRESSING, GAUZE, PETROLATUM, XEROFORM, 5 X 9 IN, STERILE

## (undated) DEVICE — DRESSING, MEPILEX, BORDER, SACRUM, 8.7 X 9.8 IN

## (undated) DEVICE — CUP, SOLUTION

## (undated) DEVICE — SYRINGE, 5 CC, LUER LOCK

## (undated) DEVICE — APPLICATOR, COTTON TIP, 6 IN, LF, STERILE

## (undated) DEVICE — SPONGE GAUZE, XRAY SC+RFID, 4X4 16 PLY, STERILE

## (undated) DEVICE — SUTURE, SILK, 2-0, 30 IN, SH, BLACK

## (undated) DEVICE — CLIP, LIGATING, W/ADHESIVE PAD, MEDIUM, TITANIUM

## (undated) DEVICE — STOCKINETTE, DOUBLE PLY, 4 X 48 IN, STERILE

## (undated) DEVICE — DRESSING, TRANSPARENT, TEGADERM, 8 X 12 IN

## (undated) DEVICE — DRESSING, MEPILEX, BORDER, HEEL, 8.7 X 9.1 IN

## (undated) DEVICE — CLIP, LIGATING, W/ADHESIVE, WIDE SLOT, SMALL, TITANIUM

## (undated) DEVICE — CATHETER, IV, INSYTE, AUTOGUARD, SHIELDED, 16 G X 1.75 IN, VIALON

## (undated) DEVICE — SYRINGE, COLLECTION, ABG, 1CC, LUER LOCK

## (undated) DEVICE — MICROCLIPS, GEM HEMOSTATIC TITANIUM

## (undated) DEVICE — BOWL, BASIN, 32 OZ, STERILE

## (undated) DEVICE — DRAPE, SHEET, EXTREMITY, W/ARM BOARD COVERS, 87 X 106 X 128 IN, DISPOSABLE, LF, STERILE

## (undated) DEVICE — CLEANER, WIPE, INSTRUMENT, 3.25 X 3.25 IN

## (undated) DEVICE — SUTURE, VICRYL, 3-0,18 IN, SH, UNDYED

## (undated) DEVICE — SPONGE, DISSECTOR, PEANUT, 3/8, STERILE 5 FOAM HOLDER"

## (undated) DEVICE — DRAPE, MICROSCOPE, W/REMOVABLE LENS

## (undated) DEVICE — PADDING, WEBRIL, UNDERCAST, STERILE, 4 IN